# Patient Record
Sex: MALE | Race: WHITE
[De-identification: names, ages, dates, MRNs, and addresses within clinical notes are randomized per-mention and may not be internally consistent; named-entity substitution may affect disease eponyms.]

---

## 2017-12-20 ENCOUNTER — HOSPITAL ENCOUNTER (INPATIENT)
Dept: HOSPITAL 45 - C.2E | Age: 62
LOS: 6 days | Discharge: HOME | DRG: 683 | End: 2017-12-26
Attending: HOSPITALIST | Admitting: FAMILY MEDICINE
Payer: COMMERCIAL

## 2017-12-20 VITALS
BODY MASS INDEX: 21.49 KG/M2 | HEIGHT: 67 IN | BODY MASS INDEX: 21.49 KG/M2 | WEIGHT: 136.91 LBS | BODY MASS INDEX: 21.49 KG/M2 | HEIGHT: 67 IN | WEIGHT: 136.91 LBS

## 2017-12-20 VITALS
DIASTOLIC BLOOD PRESSURE: 72 MMHG | SYSTOLIC BLOOD PRESSURE: 108 MMHG | OXYGEN SATURATION: 97 % | HEART RATE: 72 BPM | TEMPERATURE: 98.6 F

## 2017-12-20 VITALS
OXYGEN SATURATION: 100 % | DIASTOLIC BLOOD PRESSURE: 82 MMHG | HEART RATE: 71 BPM | SYSTOLIC BLOOD PRESSURE: 142 MMHG | TEMPERATURE: 99.86 F

## 2017-12-20 VITALS — SYSTOLIC BLOOD PRESSURE: 135 MMHG | TEMPERATURE: 99.5 F | HEART RATE: 73 BPM | DIASTOLIC BLOOD PRESSURE: 85 MMHG

## 2017-12-20 DIAGNOSIS — N17.9: ICD-10-CM

## 2017-12-20 DIAGNOSIS — Z87.891: ICD-10-CM

## 2017-12-20 DIAGNOSIS — Z94.4: ICD-10-CM

## 2017-12-20 DIAGNOSIS — E11.9: ICD-10-CM

## 2017-12-20 DIAGNOSIS — N25.0: ICD-10-CM

## 2017-12-20 DIAGNOSIS — Z96.643: ICD-10-CM

## 2017-12-20 DIAGNOSIS — B19.10: ICD-10-CM

## 2017-12-20 DIAGNOSIS — I25.2: ICD-10-CM

## 2017-12-20 DIAGNOSIS — N18.6: Primary | ICD-10-CM

## 2017-12-20 DIAGNOSIS — E87.2: ICD-10-CM

## 2017-12-20 DIAGNOSIS — E83.51: ICD-10-CM

## 2017-12-20 DIAGNOSIS — G80.9: ICD-10-CM

## 2017-12-20 DIAGNOSIS — Z79.4: ICD-10-CM

## 2017-12-20 DIAGNOSIS — D63.8: ICD-10-CM

## 2017-12-20 LAB
ALBUMIN/GLOB SERPL: 0.8 {RATIO} (ref 0.9–2)
ALP SERPL-CCNC: 100 U/L (ref 45–117)
ALT SERPL-CCNC: 41 U/L (ref 12–78)
ANION GAP SERPL CALC-SCNC: 9 MMOL/L (ref 3–11)
AST SERPL-CCNC: 22 U/L (ref 15–37)
B-OH-BUTYR SERPL-SCNC: 1.69 MG/DL (ref 0.2–2.81)
BASOPHILS # BLD: 0.02 K/UL (ref 0–0.2)
BASOPHILS NFR BLD: 0.3 %
BUN SERPL-MCNC: 71 MG/DL (ref 7–18)
BUN/CREAT SERPL: 12.2 (ref 10–20)
CALCIUM SERPL-MCNC: 6.4 MG/DL (ref 8.5–10.1)
CHLORIDE SERPL-SCNC: 107 MMOL/L (ref 98–107)
CO2 SERPL-SCNC: 18 MMOL/L (ref 21–32)
COMPLETE: YES
CREAT CL PREDICTED SERPL C-G-VRATE: 11.7 ML/MIN
CREAT SERPL-MCNC: 5.77 MG/DL (ref 0.6–1.4)
EOSINOPHIL NFR BLD AUTO: 204 K/UL (ref 130–400)
GLOBULIN SER-MCNC: 3.7 GM/DL (ref 2.5–4)
GLUCOSE SERPL-MCNC: 496 MG/DL (ref 70–99)
HCT VFR BLD CALC: 27.2 % (ref 42–52)
IG%: 0.6 %
IMM GRANULOCYTES NFR BLD AUTO: 21.5 %
INR PPP: 1.1 (ref 0.9–1.1)
LYMPHOCYTES # BLD: 1.45 K/UL (ref 1.2–3.4)
MCH RBC QN AUTO: 33.2 PG (ref 25–34)
MCHC RBC AUTO-ENTMCNC: 34.6 G/DL (ref 32–36)
MCV RBC AUTO: 96.1 FL (ref 80–100)
MONOCYTES NFR BLD: 7.9 %
NEUTROPHILS # BLD AUTO: 1.6 %
NEUTROPHILS NFR BLD AUTO: 68.1 %
PARTIAL THROMBOPLASTIN RATIO: 1
PHOSPHATE SERPL-MCNC: 5.9 MG/DL (ref 2.5–4.9)
PMV BLD AUTO: 9.2 FL (ref 7.4–10.4)
POTASSIUM SERPL-SCNC: 4.7 MMOL/L (ref 3.5–5.1)
PROTHROMBIN TIME: 11.2 SECONDS (ref 9–12)
RBC # BLD AUTO: 2.83 M/UL (ref 4.7–6.1)
SODIUM SERPL-SCNC: 134 MMOL/L (ref 136–145)
WBC # BLD AUTO: 6.74 K/UL (ref 4.8–10.8)

## 2017-12-20 RX ADMIN — HUMAN INSULIN SCH MLS/HR: 100 INJECTION, SOLUTION SUBCUTANEOUS at 20:36

## 2017-12-20 RX ADMIN — INSULIN ASPART SCH UNITS: 100 INJECTION, SOLUTION INTRAVENOUS; SUBCUTANEOUS at 21:00

## 2017-12-20 RX ADMIN — HUMAN INSULIN SCH MLS/HR: 100 INJECTION, SOLUTION SUBCUTANEOUS at 22:14

## 2017-12-20 RX ADMIN — TACROLIMUS SCH MG: 1 CAPSULE ORAL at 21:12

## 2017-12-20 RX ADMIN — AMITRIPTYLINE HYDROCHLORIDE SCH MG: 50 TABLET, FILM COATED ORAL at 21:12

## 2017-12-20 RX ADMIN — CARVEDILOL SCH MG: 3.12 TABLET, FILM COATED ORAL at 21:12

## 2017-12-20 NOTE — PHARMACY PROGRESS NOTE
Glycemic Control Intl Consult


Date of Service


Dec 20, 2017.





Scope


Glycemic Pharmacist consulted by Dr Sexton on 12/20/17 for glycemic control 

and to write orders per [facility] inpatient glycemic control protocol





Objective


Weight (Kilograms):  63.100


Accuchecks BSG (last 24hrs):











Test


  12/20/17


18:08


 


Random Glucose


  496 mg/dl


(70-99)








Laboratory Data (last 24hrs)











Test


  12/20/17


18:08


 


Anion Gap 9.0 mmol/L 


 


BUN/Creatinine Ratio 12.2 


 


Blood Urea Nitrogen 71 mg/dl 


 


Creatinine 5.77 mg/dl 


 


Potassium Level 4.7 mmol/L 


 


Sodium Level 134 mmol/L 


 


White Blood Count 6.74 K/uL 


 


Red Blood Count 2.83 M/uL 


 


Hemoglobin 9.4 g/dL 


 


Hematocrit 27.2 % 


 


Mean Corpuscular Volume 96.1 fL 


 


Mean Corpuscular Hemoglobin 33.2 pg 


 


Mean Corpuscular Hemoglobin


Concent 34.6 g/dl 


 


 


Platelet Count 204 K/uL 


 


Mean Platelet Volume 9.2 fL 











Recent Pertinent Medications


Outpatient Anti-diabetic Regimen: 


* Lantus 30 units HS


* Novolog 8/10/12 units with meals 


* A1c = 9.6 %  08/2017 per MD





Assessment & Plan


ASSESSMENT:


* 63 yo M admitted with CKD 5 - per MD going to be new to HD starting tomorrow


* Insulin sensitivity likely changing with initiation of dialysis - would be 

more comfortable with insulin drip and BSG on admission ~500 mg/dL


* Spoke with MD, OK to start drip and hold IV fluids given renal disease


* Would be beneficial to speak with patient about outpatient glycemic control 

in the AM





PLAN FOR INPATIENT GLYCEMIC CONTROL:


* Start IV insulin infusion per moderate stress protocol


 * Goal Range 100 - 180 mg/dl


 * In the critical care setting, continuous IV insulin infusion has been shown 

to be the best method for achieving glycemic targets. 





* Hold off on Lantus has patient may be made NPO at midnight and receive HD for 

first time tomorrow





* Order updated A1c tomorrow








* Please note that the plan above was derived based on current level of insulin 

resistance and hospital stress. These recommendations are appropriate for 

inpatient admission only. Plan of care upon discharge will need to be 

reassessed to avoid potential outpatient hypo/hyperglycemia. 





Thank you.

## 2017-12-20 NOTE — HISTORY AND PHYSICAL
History & Physical


Date & Time of Service:


Dec 20, 2017 at 17:00


Chief Complaint:


Hypocalcemia, Urimea, Ckd Stage 5


Primary Care Physician:


Gonzalo Rothman D.O.


History of Present Illness


Source:  patient, hospital records


This is a 63yo M with a PMH of end stage renal disease, poorly controlled DM II

, cerebral palsy, CAD (s/p stents in 2011) and s/p liver transplant (1998) who 

was admitted directly from Dr. Arzola's nephrology clinic. Per patient, he has 

been in discussion with Dr. Arzola about the initiation of dialysis for awhile 

but has opted to continue monitoring labwork. Patient is a poor historian, but 

per chart review, he was admitted at [facility] in [facility] for 

uncontrolled diabetes from 12/6-12/12. During this time, his creatinine was >5. 

Followed up with  after hospitalization for repeat labwork. Based on 

abnormal labs, unintentional weight loss and decreased ability to make urine, 

the decision was made to initiate dialysis. Initially presented to [facility] yesterday but for unclear reasons, patient left. Was then directly 

admitted to [facility]. Has agreed to the elective tunnel catheter placement dialysis 

access by vascular surgery. 





Endorses decreased urinary output. Denies fever, chills, confusion, CP, SOB, 

abd pain, nausea, vomiting, constipation, fatigue. Has cerebral palsy and 

ambulates with a walker. Is more symptomatic on R side.





Past Medical/Surgical History


Medical Problems:


(1) Cerebral palsy


Status: Chronic  





(2) Diabetes mellitus type II, uncontrolled


Status: Chronic  





(3) End stage chronic kidney disease


Status: Chronic  





(4) Hepatitis B


Status: Chronic  





(5) Presence of stent in coronary artery in patient with coronary artery disease


Status: Chronic  





Surgical Problems:


(1) S/P liver transplant


Status: Chronic  








Family History


Patient is adopted and unaware of biological family history.





Social History


Smoking Status:  Former Smoker (Quit in 1990s)


Alcohol Use:  none


Marital Status:  single


Housing status:  lives alone





Allergies


Coded Allergies:  


     No Known Allergies (Unverified , 12/20/17)





Home Medications


Scheduled


Allopurinol (Allopurinol), 1 TAB PO DAILY


Amitriptyline Hcl (Amitriptyline Hcl), 1 TAB PO HS


Ascorbic Acid (Ascorbic Acid), 500 MG PO TIDM


Carvedilol (Carvedilol), 1 TAB PO BID


Cholecalciferol (D3 2000), 1 TAB PO DAILY


Clopidogrel (Plavix), 75 MG PO DAILY


Entecavir (Entecavir), 1 TAB PO DAILY


Ferrous Sulfate (Ferrous Sulfate), 1 TAB PO TIDM


Insulin Aspart (Novolog Flexpen), 10 UNITS PO with lunch


Insulin Aspart (Novolog), 12 PO with dinner


Insulin Aspart (Novolog Flexpen), 8 UNITS SUBD with breakfast


Insulin Glargine (Lantus), 30 SC QPM


Pantoprazole (Protonix), 40 MG PO DAILY


Pravastatin (Pravachol ), 20 MG PO DAILY


Sodium Bicarbonate (Antacid) (Sodium Bicarbonate), 1 TAB PO TID


Tacrolimus (Prograf), 4 CAP PO BID





Review of Systems


Ten systems reviewed and negative except as noted in the HPI.





Physical Exam


Vital Signs











  Date Time  Temp Pulse Resp B/P (MAP) Pulse Ox O2 Delivery O2 Flow Rate FiO2


 


12/20/17 20:00      Room Air  


 


12/20/17 19:30 37.7 71 20 142/82 (102) 100 Room Air  


 


12/20/17 16:30 37.5 73 22 135/85  Room Air  








General Appearance:  WD/WN, no apparent distress


Head:  normocephalic, atraumatic


Eyes:  normal inspection, PERRL, sclerae normal


ENT:  normal ENT inspection, hearing grossly normal, pharynx normal (moist 

mucous membranes)


Neck:  supple, no JVD, trachea midline


Respiratory/Chest:  chest non-tender, lungs clear, normal breath sounds, no 

respiratory distress, no accessory muscle use


Cardiovascular:  regular rate, rhythm, no murmur, normal peripheral pulses


Abdomen/GI:  non tender, soft, no organomegaly


Extremities/Musculoskelatal:  no calf tenderness, no pedal edema, non-tender


Neurologic/Psych:  alert, normal mood/affect, oriented x 3, + pertinent finding 

(Chronic gait abnormality 2/2 cerebral palsy )


Skin:  normal color, warm/dry, no rash





Diagnostics


Laboratory Results





Results Past 24 Hours








Test


  12/20/17


18:08 12/20/17


19:31 12/20/17


21:30 Range/Units


 


 


White Blood Count 6.74   4.8-10.8  K/uL


 


Red Blood Count 2.83   4.7-6.1  M/uL


 


Hemoglobin 9.4   14.0-18.0  g/dL


 


Hematocrit 27.2   42-52  %


 


Mean Corpuscular Volume 96.1     fL


 


Mean Corpuscular Hemoglobin 33.2   25-34  pg


 


Mean Corpuscular Hemoglobin


Concent 34.6


  


  


  32-36  g/dl


 


 


Platelet Count 204   130-400  K/uL


 


Mean Platelet Volume 9.2   7.4-10.4  fL


 


Neutrophils (%) (Auto) 68.1    %


 


Lymphocytes (%) (Auto) 21.5    %


 


Monocytes (%) (Auto) 7.9    %


 


Eosinophils (%) (Auto) 1.6    %


 


Basophils (%) (Auto) 0.3    %


 


Neutrophils # (Auto) 4.59   1.4-6.5  K/uL


 


Lymphocytes # (Auto) 1.45   1.2-3.4  K/uL


 


Monocytes # (Auto) 0.53   0.11-0.59  K/uL


 


Eosinophils # (Auto) 0.11   0-0.5  K/uL


 


Basophils # (Auto) 0.02   0-0.2  K/uL


 


RDW Standard Deviation 46.3   36.4-46.3  fL


 


RDW Coefficient of Variation 13.3   11.5-14.5  %


 


Immature Granulocyte % (Auto) 0.6    %


 


Immature Granulocyte # (Auto) 0.04   0.00-0.02  K/uL


 


Red Blood Cell Morphology Unremarkable    


 


Prothrombin Time


  11.2


  


  


  9.0-12.0


SECONDS


 


Prothromb Time International


Ratio 1.1


  


  


  0.9-1.1  


 


 


Activated Partial


Thromboplast Time 26.1


  


  


  21.0-31.0


SECONDS


 


Partial Thromboplastin Ratio 1.0    


 


Sodium Level 134   136-145  mmol/L


 


Potassium Level 4.7   3.5-5.1  mmol/L


 


Chloride Level 107     mmol/L


 


Carbon Dioxide Level 18   21-32  mmol/L


 


Anion Gap 9.0   3-11  mmol/L


 


Blood Urea Nitrogen 71   7-18  mg/dl


 


Creatinine


  5.77


  


  


  0.60-1.40


mg/dl


 


Est Creatinine Clear Calc


Drug Dose 11.7


  


  


   ml/min


 


 


Estimated GFR (


American) 11.1


  


  


   


 


 


Estimated GFR (Non-


American 9.6


  


  


   


 


 


BUN/Creatinine Ratio 12.2   10-20  


 


Random Glucose 496   70-99  mg/dl


 


Calcium Level 6.4   8.5-10.1  mg/dl


 


Phosphorus Level 5.9   2.5-4.9  mg/dl


 


Total Bilirubin 0.3   0.2-1  mg/dl


 


Aspartate Amino Transf


(AST/SGOT) 22


  


  


  15-37  U/L


 


 


Alanine Aminotransferase


(ALT/SGPT) 41


  


  


  12-78  U/L


 


 


Alkaline Phosphatase 100     U/L


 


Total Protein 6.6   6.4-8.2  gm/dl


 


Albumin 2.9   3.4-5.0  gm/dl


 


Globulin 3.7   2.5-4.0  gm/dl


 


Albumin/Globulin Ratio 0.8   0.9-2  


 


Beta-Hydroxybutyric Acid 1.69   0.2-2.81  mg/dL


 


Bedside Glucose  478 382 70-99  mg/dl











EKG


Sinus rhythm with occasional Premature ventricular complexes





Impression


Assessment and Plan


This is a 63yo M with a PMH of end stage renal disease, poorly controlled DM II

, cerebral palsy, CAD (s/p stents in 2011) and s/p liver transplant (1998) who 

was admitted directly from 's nephrology clinic for initiation of 

dialysis. 





End stage renal disease: 


-Cr elevated to 5.7 since 12/6 hospital admission


-Cr 2.7 previously 


-Nephrology consulted 


-Vascular consulted 


-Tunnel catheter placement scheduled for tomorrow 


-NPO afrer midnight 


-Monitor CMP 





DM II: uncontrolled


-Last hgb a1c of 9.6 in August 2017 


-Recheck hgb a1c


-BG in 400s when admitted 


-Glycemic consult placed


-Insulin drip initiated   





CAD (s/p stents): 


-H/o MIs in 2009 and 2011


-No CP, EKG without ischemia 


-Cont home dose coreg, statin


-Plavix held until after procedure 





S/p liver transplant: 


-H/o Hepatitic B 


-Continue Prograf, Entecavir 





Anemia: 


-2/2 chronic disease 


-No acute bleeding


-Monitor CBC 





DVT Ppx: SCDs 


Code status: FULL 


PCP: Stephan 


Dispo: Admitted to telemetry. Discharge planning ordered. 





Patient seen in collaboration with Dr. Sexton. Please see addendum.





Level of Care


Telemetry





Advanced Directives


Existing Living Will:  No


Existing Power of :  No





Resuscitation Status


FULL RESUSCITATION





VTE Prophylaxis


VTE Risk Assessment Done? Y/N:  Yes


Risk Level:  Moderate


Given or contraindicated:  SCD's





Social Service Consult


Receiving Home Health

## 2017-12-20 NOTE — PROGRESS NOTE
Internal Med Progress Note


Date of Service:


Dec 20, 2017.


Provider Documentation:





SUBJECTIVE:





Patient is a 62 yr male with PMH of DM II, End Stage Renal Disease, Cerebral 

Palsy, S/P Liver transplant and other problems was a direct admit on referral 

from his Nephrologist  for an elective Tunneled catheter placement and 

initiation of dialysis. Patient is a poor historian. Denies any history of 

chest pain, SOB, dizziness, cough, fever, chills, nausea, vomiting, abdominal 

pain, diarrhea, dysuria. Please refer to H&P for complete details.   








OBJECTIVE:





Vital Signs-as noted below





Physical Exam:


General Appearance:Moderately built and nourished, no apparent distress


Head:  normocephalic, Atraumatic 


Eyes:  normal inspection, EOMI, PERRL


Neck:  supple, Trachea midline


Respiratory/Chest: Normal breath sounds, CTA


Cardiovascular: S1, S2, No murmur


Abdomen/GI:Soft, Non tender, Bowel sounds present


Extremities/Musculoskelatal:normal inspection, no edema 


Neurologic/Psych:AAOX3, grossly no focal neurological deficits 


Skin:  normal color, warm





Lab data as noted below.


ASSESSMENT & PLAN:





End stage Renal Disease:


Cr:5.7


Vascular surgery consulted for Tunneled Catheter placement


Nephrology consulted for management of Hemodialysis


Monitor renal function








Uncontrolled DM II:


Last A1C:9.6 in Dec, 2017


Blood glucose levels in 400s


Will start on Insulin drip


Pharmacy Glycemic control consulted


Pharmacy to manage Insulin








Anemia of Chronic Disease:


No acute bleeding issues


Monitor Hb








Please review H&P for further details and management


Vital Signs:











  Date Time  Temp Pulse Resp B/P (MAP) Pulse Ox O2 Delivery O2 Flow Rate FiO2


 


12/20/17 19:30 37.7 71 20 142/82 (102) 100 Room Air  


 


12/20/17 16:30 37.5 73 22 135/85  Room Air  








Lab Results:





Results Past 24 Hours








Test


  12/20/17


18:08 12/20/17


19:31 Range/Units


 


 


White Blood Count 6.74  4.8-10.8  K/uL


 


Red Blood Count 2.83  4.7-6.1  M/uL


 


Hemoglobin 9.4  14.0-18.0  g/dL


 


Hematocrit 27.2  42-52  %


 


Mean Corpuscular Volume 96.1    fL


 


Mean Corpuscular Hemoglobin 33.2  25-34  pg


 


Mean Corpuscular Hemoglobin


Concent 34.6


  


  32-36  g/dl


 


 


Platelet Count 204  130-400  K/uL


 


Mean Platelet Volume 9.2  7.4-10.4  fL


 


Neutrophils (%) (Auto) 68.1   %


 


Lymphocytes (%) (Auto) 21.5   %


 


Monocytes (%) (Auto) 7.9   %


 


Eosinophils (%) (Auto) 1.6   %


 


Basophils (%) (Auto) 0.3   %


 


Neutrophils # (Auto) 4.59  1.4-6.5  K/uL


 


Lymphocytes # (Auto) 1.45  1.2-3.4  K/uL


 


Monocytes # (Auto) 0.53  0.11-0.59  K/uL


 


Eosinophils # (Auto) 0.11  0-0.5  K/uL


 


Basophils # (Auto) 0.02  0-0.2  K/uL


 


RDW Standard Deviation 46.3  36.4-46.3  fL


 


RDW Coefficient of Variation 13.3  11.5-14.5  %


 


Immature Granulocyte % (Auto) 0.6   %


 


Immature Granulocyte # (Auto) 0.04  0.00-0.02  K/uL


 


Red Blood Cell Morphology Unremarkable   


 


Prothrombin Time


  11.2


  


  9.0-12.0


SECONDS


 


Prothromb Time International


Ratio 1.1


  


  0.9-1.1  


 


 


Activated Partial


Thromboplast Time 26.1


  


  21.0-31.0


SECONDS


 


Partial Thromboplastin Ratio 1.0   


 


Sodium Level 134  136-145  mmol/L


 


Potassium Level 4.7  3.5-5.1  mmol/L


 


Chloride Level 107    mmol/L


 


Carbon Dioxide Level 18  21-32  mmol/L


 


Anion Gap 9.0  3-11  mmol/L


 


Blood Urea Nitrogen 71  7-18  mg/dl


 


Creatinine


  5.77


  


  0.60-1.40


mg/dl


 


Est Creatinine Clear Calc


Drug Dose 11.7


  


   ml/min


 


 


Estimated GFR (


American) 11.1


  


   


 


 


Estimated GFR (Non-


American 9.6


  


   


 


 


BUN/Creatinine Ratio 12.2  10-20  


 


Random Glucose 496  70-99  mg/dl


 


Calcium Level 6.4  8.5-10.1  mg/dl


 


Phosphorus Level 5.9  2.5-4.9  mg/dl


 


Total Bilirubin 0.3  0.2-1  mg/dl


 


Aspartate Amino Transf


(AST/SGOT) 22


  


  15-37  U/L


 


 


Alanine Aminotransferase


(ALT/SGPT) 41


  


  12-78  U/L


 


 


Alkaline Phosphatase 100    U/L


 


Total Protein 6.6  6.4-8.2  gm/dl


 


Albumin 2.9  3.4-5.0  gm/dl


 


Globulin 3.7  2.5-4.0  gm/dl


 


Albumin/Globulin Ratio 0.8  0.9-2  


 


Beta-Hydroxybutyric Acid 1.69  0.2-2.81  mg/dL


 


Bedside Glucose  478 70-99  mg/dl

## 2017-12-21 VITALS — HEART RATE: 83 BPM | DIASTOLIC BLOOD PRESSURE: 90 MMHG | SYSTOLIC BLOOD PRESSURE: 158 MMHG | OXYGEN SATURATION: 100 %

## 2017-12-21 VITALS
TEMPERATURE: 98.96 F | HEART RATE: 72 BPM | DIASTOLIC BLOOD PRESSURE: 77 MMHG | OXYGEN SATURATION: 97 % | SYSTOLIC BLOOD PRESSURE: 129 MMHG

## 2017-12-21 VITALS
DIASTOLIC BLOOD PRESSURE: 91 MMHG | OXYGEN SATURATION: 100 % | TEMPERATURE: 96.98 F | HEART RATE: 67 BPM | SYSTOLIC BLOOD PRESSURE: 146 MMHG

## 2017-12-21 VITALS — DIASTOLIC BLOOD PRESSURE: 94 MMHG | SYSTOLIC BLOOD PRESSURE: 162 MMHG | HEART RATE: 65 BPM

## 2017-12-21 VITALS
TEMPERATURE: 97.7 F | HEART RATE: 68 BPM | SYSTOLIC BLOOD PRESSURE: 127 MMHG | DIASTOLIC BLOOD PRESSURE: 81 MMHG | OXYGEN SATURATION: 100 %

## 2017-12-21 VITALS
OXYGEN SATURATION: 100 % | DIASTOLIC BLOOD PRESSURE: 90 MMHG | TEMPERATURE: 98.6 F | HEART RATE: 69 BPM | SYSTOLIC BLOOD PRESSURE: 140 MMHG

## 2017-12-21 VITALS — TEMPERATURE: 96.98 F | HEART RATE: 62 BPM | SYSTOLIC BLOOD PRESSURE: 168 MMHG | DIASTOLIC BLOOD PRESSURE: 92 MMHG

## 2017-12-21 VITALS — DIASTOLIC BLOOD PRESSURE: 84 MMHG | HEART RATE: 64 BPM | TEMPERATURE: 97.16 F | SYSTOLIC BLOOD PRESSURE: 152 MMHG

## 2017-12-21 VITALS — SYSTOLIC BLOOD PRESSURE: 140 MMHG | HEART RATE: 67 BPM | DIASTOLIC BLOOD PRESSURE: 94 MMHG

## 2017-12-21 VITALS — SYSTOLIC BLOOD PRESSURE: 157 MMHG | DIASTOLIC BLOOD PRESSURE: 91 MMHG | HEART RATE: 64 BPM

## 2017-12-21 VITALS — DIASTOLIC BLOOD PRESSURE: 84 MMHG | SYSTOLIC BLOOD PRESSURE: 132 MMHG | HEART RATE: 60 BPM

## 2017-12-21 VITALS
HEART RATE: 66 BPM | DIASTOLIC BLOOD PRESSURE: 68 MMHG | TEMPERATURE: 97.7 F | OXYGEN SATURATION: 99 % | SYSTOLIC BLOOD PRESSURE: 109 MMHG

## 2017-12-21 VITALS
OXYGEN SATURATION: 99 % | DIASTOLIC BLOOD PRESSURE: 65 MMHG | HEART RATE: 65 BPM | TEMPERATURE: 98.06 F | SYSTOLIC BLOOD PRESSURE: 107 MMHG

## 2017-12-21 VITALS — OXYGEN SATURATION: 100 % | DIASTOLIC BLOOD PRESSURE: 81 MMHG | HEART RATE: 73 BPM | SYSTOLIC BLOOD PRESSURE: 153 MMHG

## 2017-12-21 VITALS — SYSTOLIC BLOOD PRESSURE: 151 MMHG | DIASTOLIC BLOOD PRESSURE: 82 MMHG | HEART RATE: 64 BPM

## 2017-12-21 VITALS — SYSTOLIC BLOOD PRESSURE: 141 MMHG | HEART RATE: 67 BPM | DIASTOLIC BLOOD PRESSURE: 91 MMHG

## 2017-12-21 VITALS — SYSTOLIC BLOOD PRESSURE: 148 MMHG | DIASTOLIC BLOOD PRESSURE: 78 MMHG | HEART RATE: 60 BPM

## 2017-12-21 VITALS — DIASTOLIC BLOOD PRESSURE: 89 MMHG | SYSTOLIC BLOOD PRESSURE: 140 MMHG | HEART RATE: 65 BPM

## 2017-12-21 VITALS — OXYGEN SATURATION: 100 % | SYSTOLIC BLOOD PRESSURE: 126 MMHG | DIASTOLIC BLOOD PRESSURE: 83 MMHG | HEART RATE: 78 BPM

## 2017-12-21 LAB
ANION GAP SERPL CALC-SCNC: 9 MMOL/L (ref 3–11)
BUN SERPL-MCNC: 69 MG/DL (ref 7–18)
BUN/CREAT SERPL: 12.4 (ref 10–20)
CALCIUM SERPL-MCNC: 6.3 MG/DL (ref 8.5–10.1)
CHLORIDE SERPL-SCNC: 115 MMOL/L (ref 98–107)
CO2 SERPL-SCNC: 17 MMOL/L (ref 21–32)
CREAT CL PREDICTED SERPL C-G-VRATE: 11.9 ML/MIN
CREAT SERPL-MCNC: 5.62 MG/DL (ref 0.6–1.4)
EOSINOPHIL NFR BLD AUTO: 181 K/UL (ref 130–400)
EST. AVERAGE GLUCOSE BLD GHB EST-MCNC: 209 MG/DL
GLUCOSE SERPL-MCNC: 85 MG/DL (ref 70–99)
HCT VFR BLD CALC: 25.2 % (ref 42–52)
MAGNESIUM SERPL-MCNC: 1.7 MG/DL (ref 1.8–2.4)
MCH RBC QN AUTO: 33.2 PG (ref 25–34)
MCHC RBC AUTO-ENTMCNC: 34.5 G/DL (ref 32–36)
MCV RBC AUTO: 96.2 FL (ref 80–100)
PHOSPHATE SERPL-MCNC: 6.6 MG/DL (ref 2.5–4.9)
PMV BLD AUTO: 9.3 FL (ref 7.4–10.4)
POTASSIUM SERPL-SCNC: 4 MMOL/L (ref 3.5–5.1)
RBC # BLD AUTO: 2.62 M/UL (ref 4.7–6.1)
SODIUM SERPL-SCNC: 141 MMOL/L (ref 136–145)
WBC # BLD AUTO: 7.11 K/UL (ref 4.8–10.8)

## 2017-12-21 PROCEDURE — 05HM33Z INSERTION OF INFUSION DEVICE INTO RIGHT INTERNAL JUGULAR VEIN, PERCUTANEOUS APPROACH: ICD-10-PCS | Performed by: SURGERY

## 2017-12-21 RX ADMIN — INSULIN ASPART SCH UNITS: 100 INJECTION, SOLUTION INTRAVENOUS; SUBCUTANEOUS at 18:00

## 2017-12-21 RX ADMIN — CALCIUM ACETATE SCH MG: 667 CAPSULE ORAL at 11:30

## 2017-12-21 RX ADMIN — ALLOPURINOL SCH MG: 100 TABLET ORAL at 09:00

## 2017-12-21 RX ADMIN — CALCIUM ACETATE SCH MG: 667 CAPSULE ORAL at 17:11

## 2017-12-21 RX ADMIN — OXYCODONE HYDROCHLORIDE AND ACETAMINOPHEN SCH MG: 500 TABLET ORAL at 17:11

## 2017-12-21 RX ADMIN — INSULIN ASPART SCH UNITS: 100 INJECTION, SOLUTION INTRAVENOUS; SUBCUTANEOUS at 08:00

## 2017-12-21 RX ADMIN — PANTOPRAZOLE SCH MG: 40 TABLET, DELAYED RELEASE ORAL at 12:04

## 2017-12-21 RX ADMIN — TACROLIMUS SCH MG: 1 CAPSULE ORAL at 21:15

## 2017-12-21 RX ADMIN — OXYCODONE HYDROCHLORIDE AND ACETAMINOPHEN SCH MG: 500 TABLET ORAL at 07:30

## 2017-12-21 RX ADMIN — Medication SCH INTER.UNIT: at 09:00

## 2017-12-21 RX ADMIN — CARVEDILOL SCH MG: 3.12 TABLET, FILM COATED ORAL at 21:15

## 2017-12-21 RX ADMIN — CALCIUM ACETATE SCH MG: 667 CAPSULE ORAL at 07:30

## 2017-12-21 RX ADMIN — CARVEDILOL SCH MG: 3.12 TABLET, FILM COATED ORAL at 09:00

## 2017-12-21 RX ADMIN — OXYCODONE HYDROCHLORIDE AND ACETAMINOPHEN SCH MG: 500 TABLET ORAL at 11:30

## 2017-12-21 RX ADMIN — PRAVASTATIN SODIUM SCH MG: 20 TABLET ORAL at 09:00

## 2017-12-21 RX ADMIN — AMITRIPTYLINE HYDROCHLORIDE SCH MG: 50 TABLET, FILM COATED ORAL at 21:15

## 2017-12-21 RX ADMIN — INSULIN ASPART SCH UNITS: 100 INJECTION, SOLUTION INTRAVENOUS; SUBCUTANEOUS at 12:00

## 2017-12-21 RX ADMIN — TACROLIMUS SCH MG: 1 CAPSULE ORAL at 09:00

## 2017-12-21 NOTE — [facility] OPERATIVE REPORT
Operative Report


Operative Date


Dec 21, 2017.





Pre-Operative Diagnosis





Acute Renal Failure





Post-Operative Diagnosis





None





Procedure(s) Performed





Insertion of Perm Catheter, Right Internal Jugular Approach, Ultrasound 


Localization of Right Internal Jugular Vein, Fluoroscopy for positioning.





Surgeon


Dr. Waite





Assistant Surgeon(s)


None





Estimated Blood Loss


5





Findings


Tip in distal SVC





Specimens





None





Anesthesia


Local with sedation





Complication(s)


None





Disposition


Recovery Room / PACU





Indications


Patient is a 63 yo white male with acute renal failure in need of dialysis.  

Permcath insertion was recommended.  I have discussed the risks options and 

benefits of the procedure with the patient.  The patient understands the risks 

options and benefits and agrees to the procedure.





Description of Procedure


Patient was takent to the angio suite and placed in the supine position.  The 

right side of the neck and chest wall were prepped and draped in a sterile 

manner.  Local anesthesia was then administered to the appropriate areas of the 

neck and chest wall.  Ultrasound was then used to locate the right internal 

jugular vein.  The vein compressed easily, had no filing defects, and was 

patent.  The vein was then punctured under direct ultrasound imaging.  A 

guidewire was then passed centrally under fluoroscopic imaging.  A stab wound 

was then made in the anterior chest wall and a 19 cm permcath was passed from 

the stab wound on the chest wall to the puncture site on the neck.  The 

puncture site was then dilated till the 14Fr peel away sheath was inserted.  

The permcath was then inserted through the sheath to a central position in the 

distal superior vena cava.  The peel away sheath was then removed.  The 

catheter was then sutured in place using nylon sutures.  The puncture was then 

closed using a 4-0 Vicryl subcuticular suture.  Dermabond was used for a 

dressing on the puncture site.  Both ports aspirated and flushed easily and 

were then packed with heparin.  A sterile dressing was applied to the catheter.

  The patient left the angio suite in good condition and tolerated the 

procedure well.


I attest to the content of the Intraoperative Record and any orders documented 

therein.  Any exceptions are noted below.

## 2017-12-21 NOTE — PROGRESS NOTE
Internal Med Progress Note


Date of Service:


Dec 21, 2017.


Provider Documentation:





SUBJECTIVE:


Seen and examined at bedside


Feels well


No complaints


Planned for Tunneled Catheter placement and  dialysis today  








OBJECTIVE:





Vital Signs-as noted below





Physical Exam:





General Appearance:Moderately built and nourished, no apparent distress


Head:  normocephalic, Atraumatic 


Eyes:  normal inspection, EOMI, PERRL


Neck:  supple, Trachea midline


Respiratory/Chest: Normal breath sounds, CTA


Cardiovascular: S1, S2, No murmur


Abdomen/GI:Soft, Non tender, Bowel sounds present


Extremities/Musculoskelatal:normal inspection, no edema 


Neurologic/Psych:AAOX3, grossly no focal neurological deficits 


Skin:  normal color, warm








Lab data as noted below.


ASSESSMENT & PLAN:





Patient is a 62yr male with PMH of ESRD, poorly controlled DM II, cerebral palsy

, CAD (s/p stents in 2011) and s/p liver transplant (1998) who was admitted 

directly from 's nephrology clinic for initiation of dialysis. 








End stage Renal Disease:


Cr:5.62


Vascular surgery consulted for Tunneled Catheter placement today


Nephrology consulted for management of Hemodialysis


Monitor renal function








Uncontrolled DM II:


Last A1C:9.6 in Dec, 2017


A1C:8.9


Blood glucose levels in 400s on presentation


Blood sugar levels improved


DC Insulin drip


Start ISS


Pharmacy Glycemic control consulted


Pharmacy to manage Insulin








Anemia of Chronic Disease:


No acute bleeding issues


Monitor Hb


Hb:8.7 today











CAD S/P stents  


H/o MIs in 2009 and 2011


No acute issues  


Continue coreg, statin


Plavix held for procedure 








S/p liver transplant: 


H/o Hepatitic B 


Continue Prograf, Entecavir 








DVT Px:


SCDs 





Code status:


 FULL 





PCP: Stephan 





Disposition:


Plan to discharge home when stable


Vital Signs:











  Date Time  Temp Pulse Resp B/P (MAP) Pulse Ox O2 Delivery O2 Flow Rate FiO2


 


12/21/17 08:22 36.7 65 16 107/65 (79) 99 Room Air  


 


12/21/17 08:00      Room Air  


 


12/21/17 04:00      Room Air  


 


12/21/17 03:49 36.5 66 16 109/68 (82) 99 Room Air  


 


12/20/17 23:59      Room Air  


 


12/20/17 23:29 37.0 72 20 108/72 (84) 97 Room Air  


 


12/20/17 20:00      Room Air  


 


12/20/17 19:30 37.7 71 20 142/82 (102) 100 Room Air  


 


12/20/17 16:30 37.5 73 22 135/85  Room Air  








Lab Results:





Results Past 24 Hours








Test


  12/20/17


18:08 12/20/17


19:31 12/20/17


21:30 12/20/17


23:28 Range/Units


 


 


White Blood Count 6.74    4.8-10.8  K/uL


 


Red Blood Count 2.83    4.7-6.1  M/uL


 


Hemoglobin 9.4    14.0-18.0  g/dL


 


Hematocrit 27.2    42-52  %


 


Mean Corpuscular Volume 96.1      fL


 


Mean Corpuscular Hemoglobin 33.2    25-34  pg


 


Mean Corpuscular Hemoglobin


Concent 34.6


  


  


  


  32-36  g/dl


 


 


Platelet Count 204    130-400  K/uL


 


Mean Platelet Volume 9.2    7.4-10.4  fL


 


Neutrophils (%) (Auto) 68.1     %


 


Lymphocytes (%) (Auto) 21.5     %


 


Monocytes (%) (Auto) 7.9     %


 


Eosinophils (%) (Auto) 1.6     %


 


Basophils (%) (Auto) 0.3     %


 


Neutrophils # (Auto) 4.59    1.4-6.5  K/uL


 


Lymphocytes # (Auto) 1.45    1.2-3.4  K/uL


 


Monocytes # (Auto) 0.53    0.11-0.59  K/uL


 


Eosinophils # (Auto) 0.11    0-0.5  K/uL


 


Basophils # (Auto) 0.02    0-0.2  K/uL


 


RDW Standard Deviation 46.3    36.4-46.3  fL


 


RDW Coefficient of Variation 13.3    11.5-14.5  %


 


Immature Granulocyte % (Auto) 0.6     %


 


Immature Granulocyte # (Auto) 0.04    0.00-0.02  K/uL


 


Red Blood Cell Morphology Unremarkable     


 


Prothrombin Time


  11.2


  


  


  


  9.0-12.0


SECONDS


 


Prothromb Time International


Ratio 1.1


  


  


  


  0.9-1.1  


 


 


Activated Partial


Thromboplast Time 26.1


  


  


  


  21.0-31.0


SECONDS


 


Partial Thromboplastin Ratio 1.0     


 


Sodium Level 134    136-145  mmol/L


 


Potassium Level 4.7    3.5-5.1  mmol/L


 


Chloride Level 107      mmol/L


 


Carbon Dioxide Level 18    21-32  mmol/L


 


Anion Gap 9.0    3-11  mmol/L


 


Blood Urea Nitrogen 71    7-18  mg/dl


 


Creatinine


  5.77


  


  


  


  0.60-1.40


mg/dl


 


Est Creatinine Clear Calc


Drug Dose 11.7


  


  


  


   ml/min


 


 


Estimated GFR (


American) 11.1


  


  


  


   


 


 


Estimated GFR (Non-


American 9.6


  


  


  


   


 


 


BUN/Creatinine Ratio 12.2    10-20  


 


Random Glucose 496    70-99  mg/dl


 


Calcium Level 6.4    8.5-10.1  mg/dl


 


Phosphorus Level 5.9    2.5-4.9  mg/dl


 


Total Bilirubin 0.3    0.2-1  mg/dl


 


Aspartate Amino Transf


(AST/SGOT) 22


  


  


  


  15-37  U/L


 


 


Alanine Aminotransferase


(ALT/SGPT) 41


  


  


  


  12-78  U/L


 


 


Alkaline Phosphatase 100      U/L


 


Total Protein 6.6    6.4-8.2  gm/dl


 


Albumin 2.9    3.4-5.0  gm/dl


 


Globulin 3.7    2.5-4.0  gm/dl


 


Albumin/Globulin Ratio 0.8    0.9-2  


 


Beta-Hydroxybutyric Acid 1.69    0.2-2.81  mg/dL


 


Bedside Glucose  478 382 352 70-99  mg/dl


 


Test


  12/21/17


00:31 12/21/17


01:31 12/21/17


02:30 12/21/17


04:34 Range/Units


 


 


Bedside Glucose 230 181 164 101 70-99  mg/dl


 


Test


  12/21/17


05:36 12/21/17


06:31 12/21/17


06:57 12/21/17


08:12 Range/Units


 


 


White Blood Count 7.11    4.8-10.8  K/uL


 


Red Blood Count 2.62    4.7-6.1  M/uL


 


Hemoglobin 8.7    14.0-18.0  g/dL


 


Hematocrit 25.2    42-52  %


 


Mean Corpuscular Volume 96.2      fL


 


Mean Corpuscular Hemoglobin 33.2    25-34  pg


 


Mean Corpuscular Hemoglobin


Concent 34.5


  


  


  


  32-36  g/dl


 


 


RDW Standard Deviation 45.9    36.4-46.3  fL


 


RDW Coefficient of Variation 13.3    11.5-14.5  %


 


Platelet Count 181    130-400  K/uL


 


Mean Platelet Volume 9.3    7.4-10.4  fL


 


Sodium Level 141    136-145  mmol/L


 


Potassium Level 4.0    3.5-5.1  mmol/L


 


Chloride Level 115      mmol/L


 


Carbon Dioxide Level 17    21-32  mmol/L


 


Anion Gap 9.0    3-11  mmol/L


 


Blood Urea Nitrogen 69    7-18  mg/dl


 


Creatinine


  5.62


  


  


  


  0.60-1.40


mg/dl


 


Est Creatinine Clear Calc


Drug Dose 11.9


  


  


  


   ml/min


 


 


Estimated GFR (


American) 11.5


  


  


  


   


 


 


Estimated GFR (Non-


American 9.9


  


  


  


   


 


 


BUN/Creatinine Ratio 12.4    10-20  


 


Random Glucose 85    70-99  mg/dl


 


Estimated Average Glucose 209     mg/dl


 


Hemoglobin A1c 8.9    4.5-5.6  %


 


Calcium Level 6.3    8.5-10.1  mg/dl


 


Phosphorus Level 6.6    2.5-4.9  mg/dl


 


Magnesium Level 1.7    1.8-2.4  mg/dl


 


Bedside Glucose  74 133 86 70-99  mg/dl

## 2017-12-21 NOTE — CLINICAL DOCUMENTATION QUERY
Dr. SKAGGS, [facility] :



**** CLINICAL DOCUMENTATION QUERY****



As able, please specify the type of CP in your patient as this affects severity of illness 
and risk of mortality.  Thank you. 



In your clinical opinion is this patient being managed for:

    

                        ( x ) other cerebral palsy

                        (  ) Not Agree



                        (  ) Other explanation of clinical findings (Please Explain)

                        (  ) Unable to determine (Please Define)

                        (  ) Need to Discuss

                        



The medical record reflects the following clinical findings, treatment, and risk factors.  
  



Clinical Indicators: Ambulates with walker, more symptomatic on right side

Treatment: Walker

Risk Factors: Idiopathic



Please clarify and document your clinical opinion in the progress notes and discharge 
summary. Terms such as "probable", "suspected", "likely", "questionable", "possible", or 
"still to be ruled out" are acceptable. 



*****IF IN AGREEMENT, YOU MUST DOCUMENT ABOVE DIAGNOSTIC STATEMENT IN DAILY PROGRESS NOTES 
AND DISCHARGE SUMMARY. This document is not part of the patient's record.*****

Thank You, Melo Meraz, [phone]

## 2017-12-21 NOTE — ANESTHESIOLOGY PROGRESS NOTE
Anesthesia Post Op Note


Date & Time


Dec 21, 2017 at 15:42





Vital Signs


Pain Intensity:  0





Vital Signs Past 12 Hours








  Date Time  Temp Pulse Resp B/P (MAP) Pulse Ox O2 Delivery O2 Flow Rate FiO2


 


12/21/17 15:28 36.8 70 18 118/78 100 Room Air  


 


12/21/17 12:08      Room Air  


 


12/21/17 12:01 36.5 68 16 127/81 (96) 100   


 


12/21/17 08:22 36.7 65 16 107/65 (79) 99 Room Air  


 


12/21/17 08:00      Room Air  


 


12/21/17 04:00      Room Air  


 


12/21/17 03:49 36.5 66 16 109/68 (82) 99 Room Air  











Notes


Mental Status:  alert / awake / arousable, participated in evaluation


Pt Amnestic to Procedure:  Yes


Nausea / Vomiting:  adequately controlled


Pain:  adequately controlled


Airway Patency, RR, SpO2:  stable & adequate


BP & HR:  stable & adequate


Hydration State:  stable & adequate


Anesthetic Complications:  no major complications apparent

## 2017-12-21 NOTE — NEPHROLOGY CONSULTATION
DATE OF CONSULTATION:  12/21/2017

 

ATTENDING OF RECORD:  Steven Sexton MD

 

REASON FOR CONSULTATION:  End-stage renal disease.

 

HISTORY OF PRESENT ILLNESS:  This is a 62-year-old male who follows with me

in the outpatient clinic, who at a previous visit over this summer had CKD

stage IV with no proteinuria with creatinine of about 3 with the right kidney of

10.5 cm and the left kidney of 10.6 cm with echogenicity consistent with

medical renal disease from underlying diabetes and chronic allograft

nephropathy.  The patient has been a diabetic since 1998 after liver

transplant.  Hemoglobin A1c is consistently in the 9-10 range and he has

difficult time in controlling his blood sugars.  He had a liver transplant

secondary to hep B cirrhosis in 1998 and follows with liver transplant team in

[place] on Prograf 4 mg p.o. b.i.d.  The patient also has underlying

heart disease with MI in 2010 and 2011 with 2 stents, has underlying sleep

apnea as well as cerebral palsy, which affects his right side.  The patient

was doing well and then since Thanksgiving started to lose weight, had

decreased appetite and went in to [facility] from December 6th to

December 12th with hyperglycemia and creatinine was up to 5.15.  I saw him in

followup and creatinine was not improving and was having poor urinary flow

with just small dribbles out and sent to [facility] who decided labs

were stable and did not warrant admission.  So, I decided to

subsequently directly admit the patient to [facility] for a

tunneled dialysis catheter placement, insulin drip to help control blood

sugars and IV calcium to help raise the calcium levels, which were dropping

in the setting of elevated phosphorus levels.  The patient has noticed more

itching of his skin, likely a consequence of the hyperphosphatemia.

 

PAST MEDICAL HISTORY:  As above with diabetes, liver transplant, hep B

cirrhosis, heart disease with MI x2 with 2 stents, sleep apnea, cerebral

palsy, and hypertension.

 

PAST SURGICAL HISTORY:  Two stents, liver transplant, and total hip

replacement.

 

SOCIAL HISTORY:  No alcohol.  No drugs.  Former smoker, quit in 1997.



FAMILY HISTORY: no renal disease in family

 

CURRENT MEDICATIONS:  Allopurinol 100 mg a day, Plavix 75 mg a day, Protonix

40 mg a day, Pravachol 20 mg daily, vitamin D 2000 units daily, vitamin C 500

mg p.o. t.i.d. with meals, iron 325 p.o. t.i.d. with meals, Coreg 3.125 p.o.

b.i.d., sodium bicarbonate 650 mg p.o. t.i.d., Prograf 4 mg p.o. b.i.d.,

Elavil 150 mg at night, and sliding scale insulin with an insulin drip.

 

REVIEW OF SYSTEMS:  Positive fatigue.  Positive decreased appetite.  No

nausea or vomiting.  No chest pain.  Positive shortness of breath with

overexertion.  Positive decreased urination.  Positive pruritus.  No fevers

or chills.  No blurry vision.  Positive ambulatory dysfunction secondary to

cerebral palsy.  All other review of systems otherwise negative.

 

PHYSICAL EXAMINATION:

VITAL SIGNS:  Temperature 36.5, pulse 66, respiratory rate 16, blood pressure

109/68, and satting 99% on room air.

GENERAL:  Awake, alert, and oriented x3.

EYES:  No scleral icterus.

ENT:  Moist mucous membranes.

NECK:  Supple.

PULMONARY:  Clear to auscultation.

CARDIAC:  Regular rate and rhythm.

ABDOMEN:  Bowel sounds positive.  Soft, nontender, and nondistended.

EXTREMITIES:  No significant clubbing, cyanosis or edema.

NEUROLOGICALLY:  Right-sided weakness with underlying cerebral palsy.

DERMATOLOGIC:  No rash or ulcers noted.

 

LABORATORY DATA:  Sodium level is 134, potassium 4.7, chloride is 107, bicarb

is 18, BUN is 71, creatinine is 5.77, glucose was 496, calcium 6.4, and

phosphorus 5.9.  Albumin 2.9.  White count 6, H&H 9 and 27, and platelet

count is 204.  INR is 1.1.

 

IMPRESSION AND PLAN:

1.  Chronic kidney disease stage V with progression of chronic kidney disease

to the point now that the patient requires dialysis, currently n.p.o. for a

tunneled dialysis catheter today, which will be subsequently followed by 2

hours of dialysis treatment to help with clearance of toxins.

2.  Anemia.  Hemoglobin level 9.4.  We will check iron sats and ferritin

levels, may benefit from initiation of Procrit.

3.  Renal osteodystrophy.  Phosphorus levels are quite elevated.  We will

start the patient on PhosLo 1 p.o. t.i.d. with meals.  Calcium levels are low

at 6.4 and appear to be dropping in the setting of chronic hyperphosphatemia

and I have given the patient 2 grams of calcium gluconate.  Calcium levels

should start to improve as phosphorus levels improve.

4.  Metabolic acidosis.  The patient is on sodium bicarbonate, which will be

stopped secondary to initiation of dialysis.

5.  Case management.  The patient needs set up at the [facility]

[facility] and we will touch base with case management to help have the

patient get approved to that unit.

 

I appreciate the consultation.

 

 

 

KIKE

## 2017-12-21 NOTE — SURGERY CONSULTATION
Consultation


Date of Service


Dec 21, 2017.





Chief Complaint


Acute renal failure





History of Present Illness


The patient is a 62 year old male with acute renal failure.  Recommended 

permcath for dialysis at this time.





Vitals





Vital Signs Past 12 Hours








  Date Time  Temp Pulse Resp B/P (MAP) Pulse Ox O2 Delivery O2 Flow Rate FiO2


 


12/21/17 12:08      Room Air  


 


12/21/17 12:01 36.5 68 16 127/81 (96) 100   


 


12/21/17 08:22 36.7 65 16 107/65 (79) 99 Room Air  


 


12/21/17 08:00      Room Air  


 


12/21/17 04:00      Room Air  


 


12/21/17 03:49 36.5 66 16 109/68 (82) 99 Room Air  











Allergies


Coded Allergies:  


     No Known Allergies (Unverified , 12/20/17)





Home Medications


Scheduled


Allopurinol (Allopurinol), 1 TAB PO DAILY


Amitriptyline Hcl (Amitriptyline Hcl), 1 TAB PO HS


Ascorbic Acid (Ascorbic Acid), 500 MG PO TIDM


Carvedilol (Carvedilol), 1 TAB PO BID


Cholecalciferol (D3 2000), 1 TAB PO DAILY


Clopidogrel (Plavix), 75 MG PO DAILY


Entecavir (Entecavir), 1 TAB PO DAILY


Ferrous Sulfate (Ferrous Sulfate), 1 TAB PO TIDM


Insulin Aspart (Novolog Flexpen), 10 UNITS PO with lunch


Insulin Aspart (Novolog), 12 PO with dinner


Insulin Aspart (Novolog Flexpen), 8 UNITS SUBD with breakfast


Insulin Glargine (Lantus), 30 SC QPM


Pantoprazole (Protonix), 40 MG PO DAILY


Pravastatin (Pravachol ), 20 MG PO DAILY


Sodium Bicarbonate (Antacid) (Sodium Bicarbonate), 1 TAB PO TID


Tacrolimus (Prograf), 4 CAP PO BID





Problem List


Medical Problems:


(1) Cerebral palsy


(2) Diabetes mellitus type II, uncontrolled


(3) End stage chronic kidney disease


(4) Hepatitis B


(5) Presence of stent in coronary artery in patient with coronary artery disease


Surgical Problems:


(1) S/P liver transplant








Surgical / Medical History


Past Medical/Surgical History:  Diabetes, Kidney Disease





Social History


Smoking Status:  Former Smoker (Quit in 1990s)





Review of Systems


Constitutional:  No chills, No diaphoresis, No fever, No malaise, No weakness, 

No weight gain, No weight loss, No sweats, No fatigue, No problem reported


Respiratory:  No cough, No cyanosis, No MCCARTHY, No hemoptysis, No orthopnea, No PND

, No short of breath, No sputum production, No stridor, No wheezing, No dyspnea

, No problem reported


Cardiovascular:  No chest pain, No chest tightness, No chest pressure, No 

palpitations, No syncope, No diaphoresis, No edema, No intermittent claudication

, No orthopnea, No cyanosis, No mumur, No lightheadedness, No paroxysmal 

nocturnal dyspnea, No problem reported


Gastrointestinal:  No abdominal pain, No constipation, No diarrhea, No nausea, 

No vomiting, No anorexia, No appetite changes, No belching, No flatulence, No 

food intolerance, No hematemesis, No hemorrhoids, No hematochezia, No stool 

changes, No heartburn, No indigestion, No dysphagia, No rectal bleeding, No 

problem reported


Neurologic:  No dizziness, No weakness, No headache, No lethargy, No numbness, 

No paresthesia, No pre-existing deficit, No seizures, No tics, No tingling, No 

tremors, No vertigo, No memory loss, No LOC, No problem reported





Physical Exam


Constitutional:  


   General Apperance:  heathly-appearing, well-nourished, well-developed


   Level of Distress:  NAD


   Ambulation:  ambulating normally


Psychiatric:  


   Mental Status:  active & alert, normal mood, normal affect


   Orientation:  oriented except where noted


   Memory:  recent memory normal, remote memory normal


Head:  normocephalic


Lungs:  


   Auscultation:  breath sounds normal, CTA except as noted, no wheezing, no 

rales/crackles, no rhonchi


Cardiovascular:  


   Heart Auscultation:  RRR


Peripheral Pulses:  


   Radial Pulse:  normal on the left, normal on the right


   Femoral Pulse:  normal on the left, normal on the right


Abdomen:  


   Inspection & Palpation:  soft


Musculoskeletal:  normal


Extremities:  


   Upper Right:  no cyanosis, no edema, no varicosities, no palpable cord, no 

clubbing, no ulcers, no mottling


   Upper Left:  no cyanosis, no edema, no palpable cord, no clubbing, no ulcers

, no mottling


   Lower Right:  no cyanosis, no edema, no varicosities, no palpable cord, no 

clubbing, no ulcers, no mottling


   Lower Left:  no cyanosis, no edema, no varicosities, no palpable cord, no 

clubbing, no ulcers, no mottling





Assessment and Plan


Imp:


   Acute renal failure





Plan:


   Recommend insertion of permcath I have discussed the risks options and 

benefits of the procedure with the patient.  The patient understands the risks 

options and benefits and agrees to the procedure.

## 2017-12-22 VITALS — HEART RATE: 72 BPM | SYSTOLIC BLOOD PRESSURE: 127 MMHG | DIASTOLIC BLOOD PRESSURE: 88 MMHG

## 2017-12-22 VITALS
TEMPERATURE: 99.68 F | SYSTOLIC BLOOD PRESSURE: 114 MMHG | HEART RATE: 78 BPM | OXYGEN SATURATION: 96 % | DIASTOLIC BLOOD PRESSURE: 86 MMHG

## 2017-12-22 VITALS — SYSTOLIC BLOOD PRESSURE: 127 MMHG | HEART RATE: 71 BPM | TEMPERATURE: 97.7 F | DIASTOLIC BLOOD PRESSURE: 92 MMHG

## 2017-12-22 VITALS
HEART RATE: 70 BPM | SYSTOLIC BLOOD PRESSURE: 108 MMHG | TEMPERATURE: 97.88 F | OXYGEN SATURATION: 99 % | DIASTOLIC BLOOD PRESSURE: 67 MMHG

## 2017-12-22 VITALS — SYSTOLIC BLOOD PRESSURE: 134 MMHG | DIASTOLIC BLOOD PRESSURE: 85 MMHG | HEART RATE: 67 BPM

## 2017-12-22 VITALS
TEMPERATURE: 98.6 F | OXYGEN SATURATION: 96 % | SYSTOLIC BLOOD PRESSURE: 128 MMHG | DIASTOLIC BLOOD PRESSURE: 75 MMHG | HEART RATE: 86 BPM

## 2017-12-22 VITALS
SYSTOLIC BLOOD PRESSURE: 139 MMHG | OXYGEN SATURATION: 97 % | TEMPERATURE: 98.42 F | HEART RATE: 68 BPM | DIASTOLIC BLOOD PRESSURE: 84 MMHG

## 2017-12-22 VITALS — HEART RATE: 78 BPM | DIASTOLIC BLOOD PRESSURE: 83 MMHG | SYSTOLIC BLOOD PRESSURE: 107 MMHG

## 2017-12-22 VITALS — SYSTOLIC BLOOD PRESSURE: 130 MMHG | DIASTOLIC BLOOD PRESSURE: 92 MMHG | HEART RATE: 72 BPM

## 2017-12-22 VITALS — SYSTOLIC BLOOD PRESSURE: 141 MMHG | DIASTOLIC BLOOD PRESSURE: 90 MMHG | HEART RATE: 70 BPM

## 2017-12-22 VITALS — DIASTOLIC BLOOD PRESSURE: 67 MMHG | HEART RATE: 77 BPM | SYSTOLIC BLOOD PRESSURE: 124 MMHG

## 2017-12-22 VITALS
TEMPERATURE: 98.42 F | OXYGEN SATURATION: 95 % | DIASTOLIC BLOOD PRESSURE: 64 MMHG | HEART RATE: 80 BPM | SYSTOLIC BLOOD PRESSURE: 100 MMHG

## 2017-12-22 VITALS — SYSTOLIC BLOOD PRESSURE: 139 MMHG | DIASTOLIC BLOOD PRESSURE: 85 MMHG | HEART RATE: 68 BPM

## 2017-12-22 VITALS — HEART RATE: 67 BPM | TEMPERATURE: 97.7 F | SYSTOLIC BLOOD PRESSURE: 152 MMHG | DIASTOLIC BLOOD PRESSURE: 95 MMHG

## 2017-12-22 VITALS — SYSTOLIC BLOOD PRESSURE: 125 MMHG | HEART RATE: 67 BPM | DIASTOLIC BLOOD PRESSURE: 84 MMHG

## 2017-12-22 VITALS
TEMPERATURE: 98.42 F | SYSTOLIC BLOOD PRESSURE: 146 MMHG | OXYGEN SATURATION: 100 % | DIASTOLIC BLOOD PRESSURE: 82 MMHG | HEART RATE: 76 BPM

## 2017-12-22 VITALS — DIASTOLIC BLOOD PRESSURE: 76 MMHG | HEART RATE: 67 BPM | SYSTOLIC BLOOD PRESSURE: 125 MMHG

## 2017-12-22 VITALS — SYSTOLIC BLOOD PRESSURE: 159 MMHG | HEART RATE: 66 BPM | DIASTOLIC BLOOD PRESSURE: 94 MMHG

## 2017-12-22 LAB
ANION GAP SERPL CALC-SCNC: 10 MMOL/L (ref 3–11)
BUN SERPL-MCNC: 42 MG/DL (ref 7–18)
BUN/CREAT SERPL: 10 (ref 10–20)
CALCIUM SERPL-MCNC: 7.2 MG/DL (ref 8.5–10.1)
CHLORIDE SERPL-SCNC: 109 MMOL/L (ref 98–107)
CO2 SERPL-SCNC: 20 MMOL/L (ref 21–32)
CREAT CL PREDICTED SERPL C-G-VRATE: 16.1 ML/MIN
CREAT SERPL-MCNC: 4.15 MG/DL (ref 0.6–1.4)
EOSINOPHIL NFR BLD AUTO: 185 K/UL (ref 130–400)
FERRITIN SERPL-MCNC: 136.8 NG/ML (ref 8–388)
GLUCOSE SERPL-MCNC: 199 MG/DL (ref 70–99)
HCT VFR BLD CALC: 26.3 % (ref 42–52)
MAGNESIUM SERPL-MCNC: 1.7 MG/DL (ref 1.8–2.4)
MCH RBC QN AUTO: 33 PG (ref 25–34)
MCHC RBC AUTO-ENTMCNC: 34.6 G/DL (ref 32–36)
MCV RBC AUTO: 95.3 FL (ref 80–100)
PHOSPHATE SERPL-MCNC: 4.8 MG/DL (ref 2.5–4.9)
PMV BLD AUTO: 9.3 FL (ref 7.4–10.4)
POTASSIUM SERPL-SCNC: 3.8 MMOL/L (ref 3.5–5.1)
RBC # BLD AUTO: 2.76 M/UL (ref 4.7–6.1)
SODIUM SERPL-SCNC: 139 MMOL/L (ref 136–145)
TIBC SERPL-MCNC: 204 MCG/DL (ref 250–450)
WBC # BLD AUTO: 7.08 K/UL (ref 4.8–10.8)

## 2017-12-22 RX ADMIN — TACROLIMUS SCH MG: 1 CAPSULE ORAL at 08:27

## 2017-12-22 RX ADMIN — AMITRIPTYLINE HYDROCHLORIDE SCH MG: 50 TABLET, FILM COATED ORAL at 21:01

## 2017-12-22 RX ADMIN — OXYCODONE HYDROCHLORIDE AND ACETAMINOPHEN SCH MG: 500 TABLET ORAL at 12:52

## 2017-12-22 RX ADMIN — INSULIN ASPART SCH UNITS: 100 INJECTION, SOLUTION INTRAVENOUS; SUBCUTANEOUS at 08:32

## 2017-12-22 RX ADMIN — INSULIN GLARGINE SCH UNITS: 100 INJECTION, SOLUTION SUBCUTANEOUS at 09:00

## 2017-12-22 RX ADMIN — INSULIN ASPART SCH UNITS: 100 INJECTION, SOLUTION INTRAVENOUS; SUBCUTANEOUS at 00:51

## 2017-12-22 RX ADMIN — INSULIN ASPART SCH UNITS: 100 INJECTION, SOLUTION INTRAVENOUS; SUBCUTANEOUS at 11:00

## 2017-12-22 RX ADMIN — TACROLIMUS SCH MG: 1 CAPSULE ORAL at 21:01

## 2017-12-22 RX ADMIN — PANTOPRAZOLE SCH MG: 40 TABLET, DELAYED RELEASE ORAL at 08:26

## 2017-12-22 RX ADMIN — CLOPIDOGREL BISULFATE SCH MG: 75 TABLET, FILM COATED ORAL at 12:52

## 2017-12-22 RX ADMIN — OXYCODONE HYDROCHLORIDE AND ACETAMINOPHEN SCH MG: 500 TABLET ORAL at 08:27

## 2017-12-22 RX ADMIN — CARVEDILOL SCH MG: 3.12 TABLET, FILM COATED ORAL at 21:01

## 2017-12-22 RX ADMIN — INSULIN ASPART SCH UNITS: 100 INJECTION, SOLUTION INTRAVENOUS; SUBCUTANEOUS at 17:09

## 2017-12-22 RX ADMIN — PRAVASTATIN SODIUM SCH MG: 20 TABLET ORAL at 08:27

## 2017-12-22 RX ADMIN — INSULIN GLARGINE SCH UNITS: 100 INJECTION, SOLUTION SUBCUTANEOUS at 21:03

## 2017-12-22 RX ADMIN — INSULIN ASPART SCH UNITS: 100 INJECTION, SOLUTION INTRAVENOUS; SUBCUTANEOUS at 04:19

## 2017-12-22 RX ADMIN — CALCIUM ACETATE SCH MG: 667 CAPSULE ORAL at 08:26

## 2017-12-22 RX ADMIN — CALCIUM ACETATE SCH MG: 667 CAPSULE ORAL at 17:06

## 2017-12-22 RX ADMIN — ALLOPURINOL SCH MG: 100 TABLET ORAL at 08:26

## 2017-12-22 RX ADMIN — INSULIN ASPART SCH UNITS: 100 INJECTION, SOLUTION INTRAVENOUS; SUBCUTANEOUS at 21:03

## 2017-12-22 RX ADMIN — Medication SCH INTER.UNIT: at 08:26

## 2017-12-22 RX ADMIN — CARVEDILOL SCH MG: 3.12 TABLET, FILM COATED ORAL at 08:12

## 2017-12-22 RX ADMIN — OXYCODONE HYDROCHLORIDE AND ACETAMINOPHEN SCH MG: 500 TABLET ORAL at 17:07

## 2017-12-22 RX ADMIN — CALCIUM ACETATE SCH MG: 667 CAPSULE ORAL at 12:52

## 2017-12-22 NOTE — PHARMACY PROGRESS NOTE
Pharmacy Glycemic Short Note 2


Date of Service


Dec 22, 2017.





OUTPATIENT ANTIDIABETIC REGIMEN: 


* Lantus 30 units SQ qPM


* Novolog TID with meals 


 * 8 units with breakfast


 * 10 units with lunch


 * 12 units with supper








ASSESSMENT:


* Mr Lara is a 61yo diabetic male admitted with CKD stage 5, to initiate 

dialysis.


* Patient is POD #1 s/p dialysis cath placement.  


* Patient was transitioned from IV to SQ insulin yesterday.  BSGs have been 

somewhat elevated, but hesitate to be too aggressive with his regimen, as he is 

starting dialysis for the first time.


* Pt is ordered a Type 2 diabetic/Renal diet.








PLAN FOR INPATIENT GLYCEMIC CONTROL:


* Basal insulin


 * Lantus 10 units SQ BID 





* Bolus insulin


 * NovoLog per scale ACHS or Q6hrs while NPO


 * Goal Range:  Low 140 mg/dL - High 180 mg/dL


 * Correction Factor:  20 mg/dL/unit


 * Nutritional / Prandial insulin per carb ratio of  1 unit per 10 grams CHO 

consumed








PLAN FOR DISCHARGE:


* It's possible that patient's regimen will require adjustment once they are 

receiving regular dialysis.


* Will follow-up closer to discharge.

## 2017-12-22 NOTE — PROGRESS NOTE
Internal Med Progress Note


Date of Service:


Dec 22, 2017.


Provider Documentation:





SUBJECTIVE:


Seen and examined at bedside


Got dialysis today


Also got IV Venofer


States Itchiness resolved


No other complaints











OBJECTIVE:





Vital Signs-as noted below





Physical Exam:





General Appearance:Moderately built and nourished, no apparent distress


Head:  normocephalic, Atraumatic 


Eyes:  normal inspection, EOMI, PERRL


Neck:  supple, Trachea midline


Respiratory/Chest: Normal breath sounds, CTA


Cardiovascular: S1, S2, No murmur


Abdomen/GI:Soft, Non tender, Bowel sounds present


Extremities/Musculoskelatal:normal inspection, no edema 


Neurologic/Psych:AAOX3, grossly no focal neurological deficits 


Skin:  normal color, warm








Lab data as noted below.


ASSESSMENT & PLAN:





Patient is a 62yr male with PMH of ESRD, poorly controlled DM II, cerebral palsy

, CAD (s/p stents in 2011) and s/p liver transplant (1998) who was admitted 

directly from 's nephrology clinic for initiation of dialysis. 








End stage Renal Disease:


Started on Dialysis 


Cr Improved


Got Tunneled Catheter placement on 12/21/17


Appreciate Nephrology help 


Monitor renal function








Uncontrolled DM II:


Last A1C:9.6 in Dec, 2017


A1C:8.9


Blood glucose levels in 400s on presentation


Blood sugar levels improved


DC Insulin drip


ISS, lantus


Pharmacy Glycemic control consulted


Pharmacy to manage Insulin








Anemia of Chronic Disease:


No acute bleeding issues


Monitor Hb


Hb:9.1  today


Got IV Venofer








CAD S/P stents  


H/o MIs in 2009 and 2011


No acute issues  


Continue coreg, statin, Plavix








S/p liver transplant: 


H/o Hepatitic B 


Continue Prograf, Entecavir 








DVT Px:


SCDs 





Code status:


 FULL 





PCP: Stephan 





Disposition:


Plan to discharge home when stable


Needs outpatient dialysis setup prior to discharge


Vital Signs:











  Date Time  Temp Pulse Resp B/P (MAP) Pulse Ox O2 Delivery O2 Flow Rate FiO2


 


12/22/17 11:15  78  107/83    


 


12/22/17 11:00  77  124/67    


 


12/22/17 10:45  72  127/88    


 


12/22/17 10:30  67  125/76    


 


12/22/17 10:15  68  139/85    


 


12/22/17 10:00  67  134/85    


 


12/22/17 09:45  67  125/84    


 


12/22/17 09:30  70  141/90    


 


12/22/17 09:15  66  159/94    


 


12/22/17 09:00  72  130/92    


 


12/22/17 08:43 36.5 71  127/92 (104)    


 


12/22/17 07:55 36.9 76 20 146/82 (103) 100 Room Air  


 


12/22/17 04:00      Room Air  


 


12/22/17 03:36 36.6 70 18 108/67 (81) 99 Room Air  


 


12/21/17 23:59      Room Air  


 


12/21/17 23:48 37.2 72 16 129/77 (94) 97 Room Air  


 


12/21/17 20:43 36.2 64  152/84 (106)    


 


12/21/17 20:30  65  140/89    


 


12/21/17 20:15  67  140/94    


 


12/21/17 20:00      Room Air  


 


12/21/17 20:00  60  132/84    


 


12/21/17 19:45  67  141/91    


 


12/21/17 19:30  64  151/82    


 


12/21/17 19:15  65  162/94    


 


12/21/17 19:00  67  150/91    


 


12/21/17 19:00 36.1 67 18 146/95 (112) 100 Room Air  


 


12/21/17 18:45  60  148/78    


 


12/21/17 18:30  64  157/91    


 


12/21/17 18:15 36.1 62  168/92 (117)    


 


12/21/17 17:14  83 20 158/90 (112) 100 Room Air  


 


12/21/17 16:43  73 20 153/81 (105) 100 Room Air  


 


12/21/17 16:28  78 16 126/83 (97) 100 Room Air  


 


12/21/17 16:15      Room Air  


 


12/21/17 16:15 37.0 69 16 140/90 (107) 100 Room Air  


 


12/21/17 15:52  68 16     


 


12/21/17 15:52  68 16  98   


 


12/21/17 15:51    127/80    


 


12/21/17 15:47  66 14     


 


12/21/17 15:47  66 14  99   


 


12/21/17 15:46    123/77    


 


12/21/17 15:44  67 16  99   


 


12/21/17 15:44  64 16     


 


12/21/17 15:41    127/77    


 


12/21/17 15:39  64 16     


 


12/21/17 15:39  68 16  99   


 


12/21/17 15:38  66 16     


 


12/21/17 15:38  68 16  100   


 


12/21/17 15:36    129/79    


 


12/21/17 15:33  70 16  100   


 


12/21/17 15:33  67 16     


 


12/21/17 15:31    122/78    


 


12/21/17 15:29    118/78    


 


12/21/17 15:28  71   100   


 


12/21/17 15:28  71      


 


12/21/17 15:28 36.8 70 18 118/78 100 Room Air  








Lab Results:





Results Past 24 Hours








Test


  12/21/17


15:37 12/21/17


16:23 12/21/17


16:39 12/21/17


20:22 Range/Units


 


 


Bedside Glucose 176 163  198 70-99  mg/dl


 


Hepatitis B Surface Antigen   NEG  NEG  


 


Hepatitis C Antibody   NEG  NEG  


 


Test


  12/22/17


00:25 12/22/17


03:36 12/22/17


05:19 12/22/17


06:43 Range/Units


 


 


Bedside Glucose 265 222  175 70-99  mg/dl


 


White Blood Count   7.08  4.8-10.8  K/uL


 


Red Blood Count   2.76  4.7-6.1  M/uL


 


Hemoglobin   9.1  14.0-18.0  g/dL


 


Hematocrit   26.3  42-52  %


 


Mean Corpuscular Volume   95.3    fL


 


Mean Corpuscular Hemoglobin   33.0  25-34  pg


 


Mean Corpuscular Hemoglobin


Concent 


  


  34.6


  


  32-36  g/dl


 


 


RDW Standard Deviation   45.0  36.4-46.3  fL


 


RDW Coefficient of Variation   13.1  11.5-14.5  %


 


Platelet Count   185  130-400  K/uL


 


Mean Platelet Volume   9.3  7.4-10.4  fL


 


Sodium Level   139  136-145  mmol/L


 


Potassium Level   3.8  3.5-5.1  mmol/L


 


Chloride Level   109    mmol/L


 


Carbon Dioxide Level   20  21-32  mmol/L


 


Anion Gap   10.0  3-11  mmol/L


 


Blood Urea Nitrogen   42  7-18  mg/dl


 


Creatinine


  


  


  4.15


  


  0.60-1.40


mg/dl


 


Est Creatinine Clear Calc


Drug Dose 


  


  16.1


  


   ml/min


 


 


Estimated GFR (


American) 


  


  16.5


  


   


 


 


Estimated GFR (Non-


American 


  


  14.3


  


   


 


 


BUN/Creatinine Ratio   10.0  10-20  


 


Random Glucose   199  70-99  mg/dl


 


Calcium Level   7.2  8.5-10.1  mg/dl


 


Phosphorus Level   4.8  2.5-4.9  mg/dl


 


Magnesium Level   1.7  1.8-2.4  mg/dl


 


Iron Level   45    mcg/dl


 


Total Iron Binding Capacity   204  250-450  mcg/dl


 


Transferrin   179  200-360  mg/dl


 


Transferrin % Saturation   18  20-50  %


 


Ferritin


  


  


  136.8


  


  8.0-388.0


ng/ml


 


Test


  12/22/17


10:22 


  


  


  Range/Units


 


 


Hepatitis B Surface Antibody NEG

## 2017-12-22 NOTE — DIALYSIS PROGRESS NOTE
Nephrology Dialysis Note


Date of Service:


Dec 22, 2017.


Subjective


63 yo male seen on dialysis. tolerated short dialysis treatment yesterday. had 

tunneled line placed yesterday.  pts appetite already improving.  urination has 

also improved.  was previously just dribbling out.  no sob.





Objective











  Date Time  Temp Pulse Resp B/P (MAP) Pulse Ox O2 Delivery O2 Flow Rate FiO2


 


12/22/17 08:43 36.5 71  127/92 (104)    


 


12/22/17 07:55 36.9 76 20 146/82 (103) 100 Room Air  


 


12/22/17 04:00      Room Air  


 


12/22/17 03:36 36.6 70 18 108/67 (81) 99 Room Air  


 


12/21/17 23:59      Room Air  


 


12/21/17 23:48 37.2 72 16 129/77 (94) 97 Room Air  


 


12/21/17 20:43 36.2 64  152/84 (106)    


 


12/21/17 20:30  65  140/89    


 


12/21/17 20:15  67  140/94    


 


12/21/17 20:00      Room Air  


 


12/21/17 20:00  60  132/84    


 


12/21/17 19:45  67  141/91    


 


12/21/17 19:30  64  151/82    


 


12/21/17 19:15  65  162/94    


 


12/21/17 19:00  67  150/91    


 


12/21/17 19:00 36.1 67 18 146/95 (112) 100 Room Air  


 


12/21/17 18:45  60  148/78    


 


12/21/17 18:30  64  157/91    


 


12/21/17 18:15 36.1 62  168/92 (117)    


 


12/21/17 17:14  83 20 158/90 (112) 100 Room Air  


 


12/21/17 16:43  73 20 153/81 (105) 100 Room Air  


 


12/21/17 16:28  78 16 126/83 (97) 100 Room Air  


 


12/21/17 16:15      Room Air  


 


12/21/17 16:15 37.0 69 16 140/90 (107) 100 Room Air  


 


12/21/17 15:52  68 16     


 


12/21/17 15:52  68 16  98   


 


12/21/17 15:51    127/80    


 


12/21/17 15:47  66 14     


 


12/21/17 15:47  66 14  99   


 


12/21/17 15:46    123/77    


 


12/21/17 15:44  67 16  99   


 


12/21/17 15:44  64 16     


 


12/21/17 15:41    127/77    


 


12/21/17 15:39  64 16     


 


12/21/17 15:39  68 16  99   


 


12/21/17 15:38  66 16     


 


12/21/17 15:38  68 16  100   


 


12/21/17 15:36    129/79    


 


12/21/17 15:33  70 16  100   


 


12/21/17 15:33  67 16     


 


12/21/17 15:31    122/78    


 


12/21/17 15:29    118/78    


 


12/21/17 15:28  71   100   


 


12/21/17 15:28  71      


 


12/21/17 15:28 36.8 70 18 118/78 100 Room Air  


 


12/21/17 12:08      Room Air  


 


12/21/17 12:01 36.5 68 16 127/81 (96) 100   








Physical Exam:


General-aaox3


Eyes-no scleral icterus


ENT-mmm


Neck-supple


Lungs-cta


Heart-rrr


Abdomen-bs+ s/nt/nd


Extremities-no c/c/e


Neuro-hx of cp





Current Inpatient Medications








 Medications


  (Trade)  Dose


 Ordered  Sig/Basilia


 Route  Start Time


 Stop Time Status Last Admin


Dose Admin


 


 Glucose


  (Glucose 40% Gel)  15-30


 GRAMS 15


 GRAMS...  UD  PRN


 PO  12/20/17 17:30


 1/19/18 17:29   


 


 


 Glucose


  (Glucose Chew


 Tab)  4-8


 Tablets 4


 Tabl...  UD  PRN


 PO  12/20/17 17:30


 1/19/18 17:29   


 


 


 Dextrose


  (Dextrose 50%


 50ML Syringe)  25-50ML OF


 50% DW IV


 FOR...  UD  PRN


 IV  12/20/17 17:30


 1/19/18 17:29  12/21/17 06:43


25 ML


 


 Glucagon


  (Glucagon Inj)  1 mg  UD  PRN


 SQ  12/20/17 17:30


 1/19/18 17:29   


 


 


 Allopurinol


  (Zyloprim Tab)  100 mg  DAILY


 PO  12/21/17 09:00


 1/20/18 08:59  12/22/17 08:26


100 MG


 


 Ascorbic Acid


  (Vitamin C Tab)  500 mg  TIDM


 PO  12/21/17 07:30


 1/20/18 07:29  12/22/17 08:27


500 MG


 


 Carvedilol


  (Coreg Tab)  3.125 mg  BID


 PO  12/20/17 21:00


 1/19/18 20:59  12/21/17 21:15


3.125 MG


 


 Clopidogrel


 Bisulfate


  (plAVix TAB)  75 mg  DAILY


 PO  12/21/17 09:00


 1/20/18 08:59 Future hold  


 


 


 Pantoprazole


 Sodium


  (Protonix Tab)  40 mg  DAILY


 PO  12/21/17 09:00


 1/20/18 08:59  12/22/17 08:26


40 MG


 


 Pravastatin Sodium


  (Pravachol Tab)  20 mg  DAILY


 PO  12/21/17 09:00


 1/20/18 08:59  12/22/17 08:27


20 MG


 


 Tacrolimus


  (Prograf Cap)  4 mg  BID


 PO  12/20/17 21:00


 1/19/18 20:59  12/22/17 08:27


4 MG


 


 Amitriptyline HCl


  (Elavil Tab)  150 mg  HS


 PO  12/20/17 21:00


 1/19/18 20:59  12/21/17 21:15


150 MG


 


 Cholecalciferol


  (Vitamin D Tab)  2,000


 inter.unit  DAILY


 PO  12/21/17 09:00


 1/20/18 08:59  12/22/17 08:26


2,000 INTER.UNIT


 


 Miscellaneous


 Information


  (Order Awaiting


 Action)  1 ea  QS


 N/A  12/21/17 00:00


 1/20/18 00:00   


 


 


 Miscellaneous


 Information


  (Consult


 Glycemic


 Management


 Pharmacy)  1 ea  UD  PRN


 N/A  12/20/17 19:11


 1/19/18 19:10   


 


 


 Calcium Acetate


  (Phoslo Cap)  1,334 mg  TIDM


 PO  12/21/17 07:30


 1/20/18 07:29  12/22/17 08:26


1,334 MG


 


 Insulin Aspart


  (novoLOG ASPART)  SLIDING


 SCALE  ACHS


 SC  12/22/17 03:00


 1/21/18 02:59  12/22/17 08:32


2 UNITS


 


 Iron Sucrose 100


 mg/Syringe  5 ml @ 0


 mls/min  TODAY@0800


 IV  12/22/17 08:00


 12/22/17 18:00  12/22/17 09:05


2.5 MLS/MIN


 


 Epoetin Den 4000


 units/Syringe  0.2 ml @ 1


 mls/min  TODAY@0800


 IV.  12/22/17 08:00


 12/22/17 18:00  12/22/17 09:05


1 MLS/MIN


 


 Insulin Glargine


  (Lantus Solostar


 Pen)  10 units  BID


 SC  12/22/17 09:00


 1/21/18 08:59   


 











Last 24 Hours








Test


  12/21/17


09:31 12/21/17


10:35 12/21/17


15:37 12/21/17


16:23


 


Bedside Glucose 124 mg/dl  158 mg/dl  176 mg/dl  163 mg/dl 


 


Test


  12/21/17


16:39 12/21/17


20:22 12/22/17


00:25 12/22/17


03:36


 


Hepatitis B Surface Antigen NEG    


 


Hepatitis C Antibody NEG    


 


Bedside Glucose  198 mg/dl  265 mg/dl  222 mg/dl 


 


Test


  12/22/17


05:19 


  


  


 


 


White Blood Count 7.08 K/uL    


 


Red Blood Count 2.76 M/uL    


 


Hemoglobin 9.1 g/dL    


 


Hematocrit 26.3 %    


 


Mean Corpuscular Volume 95.3 fL    


 


Mean Corpuscular Hemoglobin 33.0 pg    


 


Mean Corpuscular Hemoglobin


Concent 34.6 g/dl 


  


  


  


 


 


RDW Standard Deviation 45.0 fL    


 


RDW Coefficient of Variation 13.1 %    


 


Platelet Count 185 K/uL    


 


Mean Platelet Volume 9.3 fL    


 


Sodium Level 139 mmol/L    


 


Potassium Level 3.8 mmol/L    


 


Chloride Level 109 mmol/L    


 


Carbon Dioxide Level 20 mmol/L    


 


Anion Gap 10.0 mmol/L    


 


Blood Urea Nitrogen 42 mg/dl    


 


Creatinine 4.15 mg/dl    


 


Est Creatinine Clear Calc


Drug Dose 16.1 ml/min 


  


  


  


 


 


Estimated GFR (


American) 16.5 


  


  


  


 


 


Estimated GFR (Non-


American 14.3 


  


  


  


 


 


BUN/Creatinine Ratio 10.0    


 


Random Glucose 199 mg/dl    


 


Calcium Level 7.2 mg/dl    


 


Phosphorus Level 4.8 mg/dl    


 


Magnesium Level 1.7 mg/dl    


 


Iron Level 45 mcg/dl    


 


Total Iron Binding Capacity 204 mcg/dl    


 


Transferrin 179 mg/dl    


 


Transferrin % Saturation 18 %    


 


Ferritin 136.8 ng/ml    











Assessment & Plan


ESRD-doing 4k bath, 2.5 hours, 250/500, no fluid removal. seen on dialysis. 





anemia of renal failure-giving venofer and procrit today for goal hg of 10 to 11





CHAD-started on phoslo. calcium levels are trending back up. giving additional 

gram of calcium gluconate





Dialysis unit-needs accepted at the [facility] dialysis unit-[phone], 

preferably m-w-f.  aware.

## 2017-12-22 NOTE — ANESTHESIOLOGY PROGRESS NOTE
Anesthesia Post Op Note


Date & Time


Dec 22, 2017 at 07:58





Vital Signs


Pain Intensity:  0





Vital Signs Past 12 Hours








  Date Time  Temp Pulse Resp B/P (MAP) Pulse Ox O2 Delivery O2 Flow Rate FiO2


 


12/22/17 07:55 36.9 76 20 146/82 (103) 100 Room Air  


 


12/22/17 04:00      Room Air  


 


12/22/17 03:36 36.6 70 18 108/67 (81) 99 Room Air  


 


12/21/17 23:59      Room Air  


 


12/21/17 23:48 37.2 72 16 129/77 (94) 97 Room Air  


 


12/21/17 20:43 36.2 64  152/84 (106)    


 


12/21/17 20:30  65  140/89    


 


12/21/17 20:15  67  140/94    


 


12/21/17 20:00      Room Air  


 


12/21/17 20:00  60  132/84    











Notes


Mental Status:  alert / awake / arousable, participated in evaluation


Pt Amnestic to Procedure:  Yes


Nausea / Vomiting:  adequately controlled


Pain:  adequately controlled


Airway Patency, RR, SpO2:  stable & adequate


BP & HR:  stable & adequate


Hydration State:  stable & adequate


Anesthetic Complications:  no major complications apparent

## 2017-12-23 VITALS — SYSTOLIC BLOOD PRESSURE: 117 MMHG | DIASTOLIC BLOOD PRESSURE: 72 MMHG | HEART RATE: 72 BPM

## 2017-12-23 VITALS — SYSTOLIC BLOOD PRESSURE: 126 MMHG | DIASTOLIC BLOOD PRESSURE: 77 MMHG | TEMPERATURE: 98.06 F | HEART RATE: 71 BPM

## 2017-12-23 VITALS — HEART RATE: 67 BPM | SYSTOLIC BLOOD PRESSURE: 125 MMHG | DIASTOLIC BLOOD PRESSURE: 83 MMHG

## 2017-12-23 VITALS — HEART RATE: 74 BPM | DIASTOLIC BLOOD PRESSURE: 76 MMHG | SYSTOLIC BLOOD PRESSURE: 115 MMHG

## 2017-12-23 VITALS — SYSTOLIC BLOOD PRESSURE: 119 MMHG | HEART RATE: 69 BPM | DIASTOLIC BLOOD PRESSURE: 77 MMHG

## 2017-12-23 VITALS — SYSTOLIC BLOOD PRESSURE: 134 MMHG | DIASTOLIC BLOOD PRESSURE: 82 MMHG | HEART RATE: 72 BPM

## 2017-12-23 VITALS — SYSTOLIC BLOOD PRESSURE: 124 MMHG | HEART RATE: 71 BPM | DIASTOLIC BLOOD PRESSURE: 79 MMHG

## 2017-12-23 VITALS — SYSTOLIC BLOOD PRESSURE: 146 MMHG | DIASTOLIC BLOOD PRESSURE: 86 MMHG | HEART RATE: 73 BPM | TEMPERATURE: 98.06 F

## 2017-12-23 VITALS
TEMPERATURE: 98.06 F | HEART RATE: 78 BPM | OXYGEN SATURATION: 98 % | SYSTOLIC BLOOD PRESSURE: 111 MMHG | DIASTOLIC BLOOD PRESSURE: 65 MMHG

## 2017-12-23 VITALS — OXYGEN SATURATION: 99 %

## 2017-12-23 VITALS
HEART RATE: 80 BPM | DIASTOLIC BLOOD PRESSURE: 62 MMHG | OXYGEN SATURATION: 99 % | TEMPERATURE: 98.06 F | SYSTOLIC BLOOD PRESSURE: 105 MMHG

## 2017-12-23 VITALS
TEMPERATURE: 98.06 F | HEART RATE: 78 BPM | SYSTOLIC BLOOD PRESSURE: 111 MMHG | OXYGEN SATURATION: 98 % | DIASTOLIC BLOOD PRESSURE: 65 MMHG

## 2017-12-23 VITALS — DIASTOLIC BLOOD PRESSURE: 83 MMHG | SYSTOLIC BLOOD PRESSURE: 125 MMHG | HEART RATE: 68 BPM

## 2017-12-23 VITALS — HEART RATE: 67 BPM | DIASTOLIC BLOOD PRESSURE: 78 MMHG | SYSTOLIC BLOOD PRESSURE: 125 MMHG

## 2017-12-23 VITALS — DIASTOLIC BLOOD PRESSURE: 76 MMHG | HEART RATE: 66 BPM | SYSTOLIC BLOOD PRESSURE: 112 MMHG

## 2017-12-23 VITALS — HEART RATE: 50 BPM | DIASTOLIC BLOOD PRESSURE: 74 MMHG | SYSTOLIC BLOOD PRESSURE: 118 MMHG

## 2017-12-23 VITALS
TEMPERATURE: 97.88 F | DIASTOLIC BLOOD PRESSURE: 60 MMHG | OXYGEN SATURATION: 95 % | HEART RATE: 71 BPM | SYSTOLIC BLOOD PRESSURE: 96 MMHG

## 2017-12-23 VITALS — SYSTOLIC BLOOD PRESSURE: 126 MMHG | HEART RATE: 76 BPM | DIASTOLIC BLOOD PRESSURE: 82 MMHG

## 2017-12-23 VITALS — DIASTOLIC BLOOD PRESSURE: 84 MMHG | HEART RATE: 69 BPM | SYSTOLIC BLOOD PRESSURE: 129 MMHG

## 2017-12-23 LAB
ANION GAP SERPL CALC-SCNC: 8 MMOL/L (ref 3–11)
BUN SERPL-MCNC: 30 MG/DL (ref 7–18)
BUN/CREAT SERPL: 7.6 (ref 10–20)
CALCIUM SERPL-MCNC: 7.7 MG/DL (ref 8.5–10.1)
CHLORIDE SERPL-SCNC: 106 MMOL/L (ref 98–107)
CO2 SERPL-SCNC: 23 MMOL/L (ref 21–32)
CREAT CL PREDICTED SERPL C-G-VRATE: 17 ML/MIN
CREAT SERPL-MCNC: 4.02 MG/DL (ref 0.6–1.4)
GLUCOSE SERPL-MCNC: 176 MG/DL (ref 70–99)
HCT VFR BLD CALC: 27.1 % (ref 42–52)
MAGNESIUM SERPL-MCNC: 1.8 MG/DL (ref 1.8–2.4)
PHOSPHATE SERPL-MCNC: 3.3 MG/DL (ref 2.5–4.9)
POTASSIUM SERPL-SCNC: 4 MMOL/L (ref 3.5–5.1)
SODIUM SERPL-SCNC: 137 MMOL/L (ref 136–145)

## 2017-12-23 RX ADMIN — CARVEDILOL SCH MG: 3.12 TABLET, FILM COATED ORAL at 09:00

## 2017-12-23 RX ADMIN — INSULIN GLARGINE SCH UNITS: 100 INJECTION, SOLUTION SUBCUTANEOUS at 07:56

## 2017-12-23 RX ADMIN — ALLOPURINOL SCH MG: 100 TABLET ORAL at 07:50

## 2017-12-23 RX ADMIN — OXYCODONE HYDROCHLORIDE AND ACETAMINOPHEN SCH MG: 500 TABLET ORAL at 17:58

## 2017-12-23 RX ADMIN — CALCIUM ACETATE SCH MG: 667 CAPSULE ORAL at 07:51

## 2017-12-23 RX ADMIN — INSULIN ASPART SCH UNITS: 100 INJECTION, SOLUTION INTRAVENOUS; SUBCUTANEOUS at 07:55

## 2017-12-23 RX ADMIN — PANTOPRAZOLE SCH MG: 40 TABLET, DELAYED RELEASE ORAL at 07:51

## 2017-12-23 RX ADMIN — CLOPIDOGREL BISULFATE SCH MG: 75 TABLET, FILM COATED ORAL at 17:55

## 2017-12-23 RX ADMIN — CARVEDILOL SCH MG: 3.12 TABLET, FILM COATED ORAL at 20:45

## 2017-12-23 RX ADMIN — INSULIN ASPART SCH UNITS: 100 INJECTION, SOLUTION INTRAVENOUS; SUBCUTANEOUS at 16:30

## 2017-12-23 RX ADMIN — OXYCODONE HYDROCHLORIDE AND ACETAMINOPHEN SCH MG: 500 TABLET ORAL at 11:47

## 2017-12-23 RX ADMIN — INSULIN GLARGINE SCH UNITS: 100 INJECTION, SOLUTION SUBCUTANEOUS at 21:04

## 2017-12-23 RX ADMIN — AMITRIPTYLINE HYDROCHLORIDE SCH MG: 50 TABLET, FILM COATED ORAL at 20:49

## 2017-12-23 RX ADMIN — INSULIN ASPART SCH UNITS: 100 INJECTION, SOLUTION INTRAVENOUS; SUBCUTANEOUS at 11:49

## 2017-12-23 RX ADMIN — Medication SCH INTER.UNIT: at 07:51

## 2017-12-23 RX ADMIN — CALCIUM ACETATE SCH MG: 667 CAPSULE ORAL at 11:54

## 2017-12-23 RX ADMIN — CALCIUM ACETATE SCH MG: 667 CAPSULE ORAL at 17:59

## 2017-12-23 RX ADMIN — CLOPIDOGREL BISULFATE SCH MG: 75 TABLET, FILM COATED ORAL at 09:00

## 2017-12-23 RX ADMIN — INSULIN ASPART SCH UNITS: 100 INJECTION, SOLUTION INTRAVENOUS; SUBCUTANEOUS at 18:23

## 2017-12-23 RX ADMIN — TACROLIMUS SCH MG: 1 CAPSULE ORAL at 20:43

## 2017-12-23 RX ADMIN — TACROLIMUS SCH MG: 1 CAPSULE ORAL at 07:50

## 2017-12-23 RX ADMIN — PRAVASTATIN SODIUM SCH MG: 20 TABLET ORAL at 07:50

## 2017-12-23 RX ADMIN — CALCIUM ACETATE SCH MG: 667 CAPSULE ORAL at 11:47

## 2017-12-23 RX ADMIN — INSULIN ASPART SCH UNITS: 100 INJECTION, SOLUTION INTRAVENOUS; SUBCUTANEOUS at 21:00

## 2017-12-23 RX ADMIN — OXYCODONE HYDROCHLORIDE AND ACETAMINOPHEN SCH MG: 500 TABLET ORAL at 07:50

## 2017-12-23 RX ADMIN — OXYCODONE HYDROCHLORIDE AND ACETAMINOPHEN SCH MG: 500 TABLET ORAL at 11:54

## 2017-12-23 NOTE — NEPHROLOGY PROGRESS NOTE
Nephrology Progress Note


Date of Service:


Dec 23, 2017.


Subjective


tolerted HD ttodayy; no n/v/d/sob/conffusion





Objective











  Date Time  Temp Pulse Resp B/P (MAP) Pulse Ox O2 Delivery O2 Flow Rate FiO2


 


12/23/17 15:30  66  112/76    


 


12/23/17 15:15  67  125/78    


 


12/23/17 15:00  69  129/84    


 


12/23/17 14:45  67  125/83    


 


12/23/17 14:30  72  134/82    


 


12/23/17 14:18 36.7 71  126/77 (93)    


 


12/23/17 12:00     99 Room Air  


 


12/23/17 11:58 36.7 80 20 105/62 (76) 99 Room Air  


 


12/23/17 11:16 36.7 78 18  98   


 


12/23/17 08:05      Room Air  


 


12/23/17 07:27 36.7 78 18 111/65 (80) 98 Room Air  


 


12/23/17 04:00      Room Air  


 


12/23/17 03:58 36.6 71 16 96/60 (72) 95 Room Air  


 


12/23/17 00:00      Room Air  


 


12/22/17 23:56 36.9 80 18 100/64 (76) 95 Room Air  


 


12/22/17 20:00      Room Air  


 


12/22/17 19:06 37.6 78 16 114/86 (95) 96 Room Air  








Physical Exam:


General-aaox3, cachectic, lying flat in bed on ra


Eyes-no scleral icterus


ENT-mmm


Neck-supple


Lungs-cta


Heart-rrr


Abdomen-bs+ s/nt/nd


Extremities-no c/c/e


Neuro-hx of cp





Current Inpatient Medications








 Medications


  (Trade)  Dose


 Ordered  Sig/Basilia


 Route  Start Time


 Stop Time Status Last Admin


Dose Admin


 


 Glucose


  (Glucose 40% Gel)  15-30


 GRAMS 15


 GRAMS...  UD  PRN


 PO  12/20/17 17:30


 1/19/18 17:29   


 


 


 Glucose


  (Glucose Chew


 Tab)  4-8


 Tablets 4


 Tabl...  UD  PRN


 PO  12/20/17 17:30


 1/19/18 17:29   


 


 


 Dextrose


  (Dextrose 50%


 50ML Syringe)  25-50ML OF


 50% DW IV


 FOR...  UD  PRN


 IV  12/20/17 17:30


 1/19/18 17:29  12/21/17 06:43


25 ML


 


 Glucagon


  (Glucagon Inj)  1 mg  UD  PRN


 SQ  12/20/17 17:30


 1/19/18 17:29   


 


 


 Allopurinol


  (Zyloprim Tab)  100 mg  DAILY


 PO  12/21/17 09:00


 1/20/18 08:59  12/23/17 07:50


100 MG


 


 Ascorbic Acid


  (Vitamin C Tab)  500 mg  TIDM


 PO  12/21/17 07:30


 1/20/18 07:29  12/23/17 11:47


500 MG


 


 Carvedilol


  (Coreg Tab)  3.125 mg  BID


 PO  12/20/17 21:00


 1/19/18 20:59  12/22/17 21:01


3.125 MG


 


 Clopidogrel


 Bisulfate


  (plAVix TAB)  75 mg  DAILY


 PO  12/21/17 09:00


 1/20/18 08:59 Future hold 12/22/17 12:52


75 MG


 


 Pantoprazole


 Sodium


  (Protonix Tab)  40 mg  DAILY


 PO  12/21/17 09:00


 1/20/18 08:59  12/23/17 07:51


40 MG


 


 Pravastatin Sodium


  (Pravachol Tab)  20 mg  DAILY


 PO  12/21/17 09:00


 1/20/18 08:59  12/23/17 07:50


20 MG


 


 Tacrolimus


  (Prograf Cap)  4 mg  BID


 PO  12/20/17 21:00


 1/19/18 20:59  12/23/17 07:50


4 MG


 


 Amitriptyline HCl


  (Elavil Tab)  150 mg  HS


 PO  12/20/17 21:00


 1/19/18 20:59  12/22/17 21:01


150 MG


 


 Cholecalciferol


  (Vitamin D Tab)  2,000


 inter.unit  DAILY


 PO  12/21/17 09:00


 1/20/18 08:59  12/23/17 07:51


2,000 INTER.UNIT


 


 Miscellaneous


 Information


  (Order Awaiting


 Action)  1 ea  QS


 N/A  12/21/17 00:00


 1/20/18 00:00   


 


 


 Miscellaneous


 Information


  (Consult


 Glycemic


 Management


 Pharmacy)  1 ea  UD  PRN


 N/A  12/20/17 19:11


 1/19/18 19:10   


 


 


 Calcium Acetate


  (Phoslo Cap)  1,334 mg  TIDM


 PO  12/21/17 07:30


 1/20/18 07:29  12/23/17 11:47


1,334 MG


 


 Insulin Aspart


  (novoLOG ASPART)  SLIDING


 SCALE  ACHS


 SC  12/22/17 03:00


 1/21/18 02:59  12/23/17 11:49


10 UNITS


 


 Insulin Glargine


  (Lantus Solostar


 Pen)  10 units  BID


 SC  12/22/17 09:00


 1/21/18 08:59  12/23/17 07:56


10 UNITS


 


 Tramadol HCl


  (Ultram Tab)  50 mg  Q6H  PRN


 PO  12/23/17 00:30


 1/22/18 00:29  12/23/17 00:45


50 MG











Last 24 Hours








Test


  12/22/17


16:32 12/22/17


20:27 12/23/17


05:44 12/23/17


06:42


 


Bedside Glucose 343 mg/dl  189 mg/dl   176 mg/dl 


 


Hemoglobin   9.2 g/dL  


 


Hematocrit   27.1 %  


 


Sodium Level   137 mmol/L  


 


Potassium Level   4.0 mmol/L  


 


Chloride Level   106 mmol/L  


 


Carbon Dioxide Level   23 mmol/L  


 


Anion Gap   8.0 mmol/L  


 


Blood Urea Nitrogen   30 mg/dl  


 


Creatinine   4.02 mg/dl  


 


Est Creatinine Clear Calc


Drug Dose 


  


  17.0 ml/min 


  


 


 


Estimated GFR (


American) 


  


  17.2 


  


 


 


Estimated GFR (Non-


American 


  


  14.8 


  


 


 


BUN/Creatinine Ratio   7.6  


 


Random Glucose   176 mg/dl  


 


Calcium Level   7.7 mg/dl  


 


Phosphorus Level   3.3 mg/dl  


 


Magnesium Level   1.8 mg/dl  


 


Test


  12/23/17


11:31 


  


  


 


 


Bedside Glucose 231 mg/dl    











Assessment & Plan


61 yo male who had tdc 12/21 along w/ first HD tx same date has improved 

clinical status; awaiting final acceptance at  Dialysis unit; acceptance 

already in process; Dr Arzola will continue as outpt nephrologist 








ESRD-doing 4k bath, 3hours,  no fluid removal. 





anemia of renal failure-giving venofer and procrit for goal hg of 10 to 11





CHAD- cont current phoslo. calcium levels are trending back up. has needed 

supplemental calcium gluconate this admission but none needed today





Dialysis unit-needs to be finalized as accepted at the [facility] dialysis unit-

[phone], preferably m-w-f.  aware/application in process





Appreciate consult; will follow with you.  Care coordinated w/ Dr. Sexton.

## 2017-12-23 NOTE — PROGRESS NOTE
Internal Med Progress Note


Date of Service:


Dec 23, 2017.


Provider Documentation:





SUBJECTIVE:


Seen and examined at bedside


Planned for dialysis today


got IV Venofer yesterday


Feels well


Denies chest pain, SOB


Mild soreness at site of catheter improving 


No other complaints











OBJECTIVE:





Vital Signs-as noted below





Physical Exam:





General Appearance:Moderately built and nourished, no apparent distress


Head:  normocephalic, Atraumatic 


Eyes:  normal inspection, EOMI, PERRL


Neck:  supple, Trachea midline


Respiratory/Chest: Normal breath sounds, CTA


Cardiovascular: S1, S2, No murmur


Abdomen/GI:Soft, Non tender, Bowel sounds present


Extremities/Musculoskelatal:normal inspection, no edema 


Neurologic/Psych:AAOX3, grossly no focal neurological deficits 


Skin:  normal color, warm








Lab data as noted below.


ASSESSMENT & PLAN:





Patient is a 62yr male with PMH of ESRD, poorly controlled DM II, cerebral palsy

, CAD (s/p stents in 2011) and s/p liver transplant (1998) who was admitted 

directly from 's nephrology clinic for initiation of dialysis. 








End stage Renal Disease:


Continue Dialysis per Nephrology 


Cr levels Improved


Got Tunneled Catheter placement on 12/21/17


Appreciate Nephrology help 


Monitor renal function


Plan to be discharged once outpatient dialysis is set up 








Uncontrolled DM II:


Last A1C:9.6 in Dec, 2017


A1C:8.9


Blood glucose levels in 400s on presentation


Blood sugar levels improved


DC Insulin drip


ISS, lantus


Pharmacy Glycemic control consulted


Pharmacy to manage Insulin








Anemia of Chronic Disease:


No acute bleeding issues


Monitor Hb


Hb stable


Procrit and Venofer per Nephrology








CAD S/P stents  


H/o MIs in 2009 and 2011


No acute issues  


Continue coreg, statin, Plavix








S/p liver transplant: 


H/o Hepatitic B 


Continue Prograf, Entecavir 








DVT Px:


SCDs 





Code status:


 FULL 





PCP: Stephan 





Disposition:


Plan to discharge home when Outpatient dialysis is set up.


Vital Signs:











  Date Time  Temp Pulse Resp B/P (MAP) Pulse Ox O2 Delivery O2 Flow Rate FiO2


 


12/23/17 07:27 36.7 78 18 111/65 (80) 98 Room Air  


 


12/23/17 04:00      Room Air  


 


12/23/17 03:58 36.6 71 16 96/60 (72) 95 Room Air  


 


12/23/17 00:00      Room Air  


 


12/22/17 23:56 36.9 80 18 100/64 (76) 95 Room Air  


 


12/22/17 20:00      Room Air  


 


12/22/17 19:06 37.6 78 16 114/86 (95) 96 Room Air  


 


12/22/17 16:08 36.9 68 18 139/84 (102) 97 Room Air  


 


12/22/17 16:00      Room Air  


 


12/22/17 12:30 37.0 86 20 128/75 (92) 96 Room Air  


 


12/22/17 12:00      Room Air  


 


12/22/17 11:33 36.5 67  152/95 (114)    


 


12/22/17 11:15  78  107/83    


 


12/22/17 11:00  77  124/67    


 


12/22/17 10:45  72  127/88    


 


12/22/17 10:30  67  125/76    


 


12/22/17 10:15  68  139/85    


 


12/22/17 10:00  67  134/85    








Lab Results:





Results Past 24 Hours








Test


  12/22/17


10:22 12/22/17


16:32 12/22/17


20:27 12/23/17


05:44 Range/Units


 


 


Hepatitis B Surface Antibody NEG     


 


Bedside Glucose  343 189  70-99  mg/dl


 


Hemoglobin    9.2 14.0-18.0  g/dL


 


Hematocrit    27.1 42-52  %


 


Sodium Level    137 136-145  mmol/L


 


Potassium Level    4.0 3.5-5.1  mmol/L


 


Chloride Level    106   mmol/L


 


Carbon Dioxide Level    23 21-32  mmol/L


 


Anion Gap    8.0 3-11  mmol/L


 


Blood Urea Nitrogen    30 7-18  mg/dl


 


Creatinine


  


  


  


  4.02


  0.60-1.40


mg/dl


 


Est Creatinine Clear Calc


Drug Dose 


  


  


  17.0


   ml/min


 


 


Estimated GFR (


American) 


  


  


  17.2


   


 


 


Estimated GFR (Non-


American 


  


  


  14.8


   


 


 


BUN/Creatinine Ratio    7.6 10-20  


 


Random Glucose    176 70-99  mg/dl


 


Calcium Level    7.7 8.5-10.1  mg/dl


 


Phosphorus Level    3.3 2.5-4.9  mg/dl


 


Magnesium Level    1.8 1.8-2.4  mg/dl


 


Test


  12/23/17


06:42 


  


  


  Range/Units


 


 


Bedside Glucose 176    70-99  mg/dl

## 2017-12-23 NOTE — PHARMACY PROGRESS NOTE
Glycemic Control Progress Note


Date of Service


Dec 23, 2017.





Scope


Glycemic Pharmacist consulted for glycemic control to write orders per [facility] 

inpatient glycemic control protocol.





Objective


Accuchecks BSG (last 24hrs):











Test


  12/22/17


16:32 12/22/17


20:27 12/23/17


05:44 12/23/17


06:42


 


Bedside Glucose


  343 mg/dl


(70-99) 189 mg/dl


(70-99) 


  176 mg/dl


(70-99)


 


Random Glucose


  


  


  176 mg/dl


(70-99) 


 








HbA1c:











Test


  12/21/17


05:36


 


Hemoglobin A1c


  8.9 %


(4.5-5.6)  H











Recent Pertinent Medications








The patient is currently receiving:


* Basal insulin:             Lantus 10 units every 12 hours





* Correctional Insulin:   Novolog Correction per scale ACHS


                          Goal Range: Low 140 mg/dL - High 180 mg/dL


                          Correction Factor: 20 mg/dL/unit





* Prandial insulin:         Per carb ratio of 1 unit per 10 grams CHO consumed








* Oral Agents:             None currently





Outpatient Anti-Diabetic Meds


* Lantus 30 units SQ qPM


* Novolog TID with meals 


 * 8 units with breakfast


 * 10 units with lunch


 * 12 units with supper





Assessment & Plan


ASSESSMENT:








12/23/17


* Glycemic control was less than ideal yesterday, however patient's insulin was 

not administered as prescribed


* BSGs ranged 176-343 over the last 24 hours


* Fasting  this AM with only 10 units of Lantus on board, he was not 

given the AM 10 unit dose of Lantus yesterday AM because he was at HD


* Pre-dinner BSG climbed to 373 yesterday likely due to missing his AM Lantus + 

not being given Novolog with his Lunch yesterday


* Would like to trial the current insulin orders another day as they really 

have not been given according to schedule.


* He is ordered HD again today, which typically leads to improved insulin 

sensitivity.








PLAN FOR INPATIENT GLYCEMIC CONTROL:


* Continuing Lantus 10 units SQ BID


* Continuing correction factor of 20 mg/dl/unit


* Continuing carb ratio of 1 unit per 10 grams CHO consumed


* Changing goal range Low 120 mg/dL - High 160 mg/dL 

















* Please note that the plan above was derived based on current level of insulin 

resistance and hospital stress. These recommendations are appropriate for 

inpatient admission only. Plan of care upon discharge will need to be 

reassessed to avoid potential outpatient hypo/hyperglycemia. 








Thank you.

## 2017-12-24 VITALS
DIASTOLIC BLOOD PRESSURE: 74 MMHG | TEMPERATURE: 98.24 F | HEART RATE: 66 BPM | OXYGEN SATURATION: 97 % | SYSTOLIC BLOOD PRESSURE: 117 MMHG

## 2017-12-24 VITALS — OXYGEN SATURATION: 95 %

## 2017-12-24 VITALS
TEMPERATURE: 98.06 F | SYSTOLIC BLOOD PRESSURE: 117 MMHG | OXYGEN SATURATION: 95 % | DIASTOLIC BLOOD PRESSURE: 76 MMHG | HEART RATE: 64 BPM

## 2017-12-24 VITALS
OXYGEN SATURATION: 95 % | DIASTOLIC BLOOD PRESSURE: 71 MMHG | SYSTOLIC BLOOD PRESSURE: 106 MMHG | HEART RATE: 83 BPM | TEMPERATURE: 98.24 F

## 2017-12-24 LAB
ANION GAP SERPL CALC-SCNC: 5 MMOL/L (ref 3–11)
BUN SERPL-MCNC: 20 MG/DL (ref 7–18)
BUN/CREAT SERPL: 6.7 (ref 10–20)
CALCIUM SERPL-MCNC: 8.1 MG/DL (ref 8.5–10.1)
CHLORIDE SERPL-SCNC: 105 MMOL/L (ref 98–107)
CO2 SERPL-SCNC: 27 MMOL/L (ref 21–32)
CREAT CL PREDICTED SERPL C-G-VRATE: 21.2 ML/MIN
CREAT SERPL-MCNC: 3.05 MG/DL (ref 0.6–1.4)
GLUCOSE SERPL-MCNC: 149 MG/DL (ref 70–99)
MAGNESIUM SERPL-MCNC: 1.8 MG/DL (ref 1.8–2.4)
PHOSPHATE SERPL-MCNC: 2.6 MG/DL (ref 2.5–4.9)
POTASSIUM SERPL-SCNC: 4.1 MMOL/L (ref 3.5–5.1)
SODIUM SERPL-SCNC: 137 MMOL/L (ref 136–145)

## 2017-12-24 RX ADMIN — CALCIUM ACETATE SCH MG: 667 CAPSULE ORAL at 12:34

## 2017-12-24 RX ADMIN — CALCIUM ACETATE SCH MG: 667 CAPSULE ORAL at 08:44

## 2017-12-24 RX ADMIN — CARVEDILOL SCH MG: 3.12 TABLET, FILM COATED ORAL at 20:58

## 2017-12-24 RX ADMIN — OXYCODONE HYDROCHLORIDE AND ACETAMINOPHEN SCH MG: 500 TABLET ORAL at 08:48

## 2017-12-24 RX ADMIN — INSULIN ASPART SCH UNITS: 100 INJECTION, SOLUTION INTRAVENOUS; SUBCUTANEOUS at 21:05

## 2017-12-24 RX ADMIN — INSULIN GLARGINE SCH UNITS: 100 INJECTION, SOLUTION SUBCUTANEOUS at 08:55

## 2017-12-24 RX ADMIN — ALLOPURINOL SCH MG: 100 TABLET ORAL at 08:49

## 2017-12-24 RX ADMIN — TACROLIMUS SCH MG: 1 CAPSULE ORAL at 08:47

## 2017-12-24 RX ADMIN — AMITRIPTYLINE HYDROCHLORIDE SCH MG: 50 TABLET, FILM COATED ORAL at 21:00

## 2017-12-24 RX ADMIN — INSULIN GLARGINE SCH UNITS: 100 INJECTION, SOLUTION SUBCUTANEOUS at 21:06

## 2017-12-24 RX ADMIN — TACROLIMUS SCH MG: 1 CAPSULE ORAL at 20:59

## 2017-12-24 RX ADMIN — PANTOPRAZOLE SCH MG: 40 TABLET, DELAYED RELEASE ORAL at 08:47

## 2017-12-24 RX ADMIN — CALCIUM ACETATE SCH MG: 667 CAPSULE ORAL at 17:39

## 2017-12-24 RX ADMIN — CLOPIDOGREL BISULFATE SCH MG: 75 TABLET, FILM COATED ORAL at 08:51

## 2017-12-24 RX ADMIN — OXYCODONE HYDROCHLORIDE AND ACETAMINOPHEN SCH MG: 500 TABLET ORAL at 12:35

## 2017-12-24 RX ADMIN — CARVEDILOL SCH MG: 3.12 TABLET, FILM COATED ORAL at 08:43

## 2017-12-24 RX ADMIN — INSULIN ASPART SCH UNITS: 100 INJECTION, SOLUTION INTRAVENOUS; SUBCUTANEOUS at 12:41

## 2017-12-24 RX ADMIN — INSULIN ASPART SCH UNITS: 100 INJECTION, SOLUTION INTRAVENOUS; SUBCUTANEOUS at 17:41

## 2017-12-24 RX ADMIN — OXYCODONE HYDROCHLORIDE AND ACETAMINOPHEN SCH MG: 500 TABLET ORAL at 17:40

## 2017-12-24 RX ADMIN — INSULIN ASPART SCH UNITS: 100 INJECTION, SOLUTION INTRAVENOUS; SUBCUTANEOUS at 08:54

## 2017-12-24 RX ADMIN — PRAVASTATIN SODIUM SCH MG: 20 TABLET ORAL at 08:46

## 2017-12-24 RX ADMIN — Medication SCH INTER.UNIT: at 08:48

## 2017-12-24 NOTE — PHARMACY PROGRESS NOTE
Glycemic Control Progress Note


Date of Service


Dec 24, 2017.





Scope


Glycemic Pharmacist consulted for glycemic control to write orders per [facility] 

inpatient glycemic control protocol.





Objective


Accuchecks BSG (last 24hrs):











Test


  12/23/17


11:31 12/23/17


16:45 12/23/17


21:01 12/24/17


06:00


 


Bedside Glucose


  231 mg/dl


(70-99) 125 mg/dl


(70-99) 140 mg/dl


(70-99) 


 


 


Random Glucose


  


  


  


  149 mg/dl


(70-99)


 


Test


  12/24/17


07:53 


  


  


 


 


Bedside Glucose


  140 mg/dl


(70-99) 


  


  


 








HbA1c:











Test


  12/21/17


05:36


 


Hemoglobin A1c


  8.9 %


(4.5-5.6)  H











Recent Pertinent Medications








The patient is currently receiving:


* Basal insulin:             Lantus 10 units every 12 hours





* Correctional Insulin:   Novolog Correction per scale ACHS


                          Goal Range: Low 120 mg/dL - High 160 mg/dL


                          Correction Factor: 20 mg/dL/unit





* Prandial insulin:         Per carb ratio of 1 unit per 10 grams CHO consumed








* Oral Agents:             None currently





Outpatient Anti-Diabetic Meds


* Lantus 30 units SQ qPM


* Novolog TID with meals 


 * 8 units with breakfast


 * 10 units with lunch


 * 12 units with supper





Assessment & Plan


ASSESSMENT:








12/23/17


* Glycemic control was less than ideal yesterday, however patient's insulin was 

not administered as prescribed


* BSGs ranged 176-343 over the last 24 hours


* Fasting  this AM with only 10 units of Lantus on board, he was not 

given the AM 10 unit dose of Lantus yesterday AM because he was at HD


* Pre-dinner BSG climbed to 373 yesterday likely due to missing his AM Lantus + 

not being given Novolog with his Lunch yesterday


* Would like to trial the current insulin orders another day as they really 

have not been given according to schedule.


* He is ordered HD again today, which typically leads to improved insulin 

sensitivity.





12/24/17


* Glycemic control was much improved yesterday


* All insulin doses had been administered per schedule


* BSGs have ranged 125-231 over the last 24 hrs, only 1 BSG above 180


* Fasting BSG at goal this AM, , w/ 20 units of Lantus on board -will 

continue the same


* 2 of 3 post-prandial BSGs controlled using current CR.  Pre-lunch BSG was 

elevated yesterday, if this trend continues he may require a larger prandial 

insulin dose w/ breakfast.


* No HD is ordered for today as of yet.








PLAN FOR INPATIENT GLYCEMIC CONTROL:


* Continuing Lantus 10 units SQ BID


* Continuing correction factor of 20 mg/dl/unit


* Continuing carb ratio of 1 unit per 10 grams CHO consumed


* Change goal range to Low 110 mg/dL - High 140 mg/dL 

















* Please note that the plan above was derived based on current level of insulin 

resistance and hospital stress. These recommendations are appropriate for 

inpatient admission only. Plan of care upon discharge will need to be 

reassessed to avoid potential outpatient hypo/hyperglycemia. 








Thank you.

## 2017-12-24 NOTE — PROGRESS NOTE
Internal Med Progress Note


Date of Service:


Dec 24, 2017.


Provider Documentation:





SUBJECTIVE:


Seen and examined at bedside


Feels tired 


Got dialysis yesterday 


Denies chest pain, SOB


Mild soreness at site of catheter improving 


No other complaints


Awaiting for Outpatient HD setup








OBJECTIVE:





Vital Signs-as noted below





Physical Exam:





General Appearance:Moderately built and nourished, no apparent distress


Head:  normocephalic, Atraumatic 


Eyes:  normal inspection, EOMI, PERRL


Neck:  supple, Trachea midline


Respiratory/Chest: Normal breath sounds, CTA


Cardiovascular: S1, S2, No murmur


Abdomen/GI:Soft, Non tender, Bowel sounds present


Extremities/Musculoskelatal:normal inspection, no edema 


Neurologic/Psych:AAOX3, grossly no focal neurological deficits 


Skin:  normal color, warm








Lab data as noted below.


ASSESSMENT & PLAN:





Patient is a 62yr male with PMH of ESRD, poorly controlled DM II, cerebral palsy

, CAD (s/p stents in 2011) and s/p liver transplant (1998) who was admitted 

directly from 's nephrology clinic for initiation of dialysis. 








End stage Renal Disease:


Continue Dialysis per Nephrology 


Cr levels Improved


Got Tunneled Catheter placement on 12/21/17


Appreciate Nephrology help 


Awaiting for outpatient dialysis set up 


Monitor renal function, electrolytes








Uncontrolled DM II:


Last A1C:9.6 in Dec, 2017


A1C:8.9


Blood glucose levels in 400s on presentation


Blood sugar levels improved


DC Insulin drip


ISS, lantus


Pharmacy Glycemic control consulted


Pharmacy to manage Insulin








Anemia of Chronic Disease:


No acute bleeding issues


Monitor Hb


Hb stable


Procrit and Venofer per Nephrology








CAD S/P stents  


H/o MIs in 2009 and 2011


No acute issues  


Continue coreg, statin, Plavix








S/p liver transplant: 


H/o Hepatitic B 


Continue Prograf, Entecavir 








DVT Px:


SCDs 





Code status:


 FULL 





PCP: Stephan 





Disposition:


Plan to discharge home when Outpatient dialysis is set up.


Vital Signs:











  Date Time  Temp Pulse Resp B/P (MAP) Pulse Ox O2 Delivery O2 Flow Rate FiO2


 


12/24/17 08:15     95 Room Air  


 


12/24/17 07:45 36.7 64 18 117/76 (90) 95 Room Air  


 


12/24/17 01:40 36.8 83 18 106/71 (83) 95 Room Air  


 


12/24/17 00:00      Room Air  


 


12/23/17 20:00      Room Air  


 


12/23/17 18:02 36.7 73  146/86 (106)    


 


12/23/17 18:00     99 Room Air  


 


12/23/17 17:15  76  126/82    


 


12/23/17 17:00  50  118/74    


 


12/23/17 16:45  68  125/83    


 


12/23/17 16:30  72  117/72    


 


12/23/17 16:15  71  124/79    


 


12/23/17 16:00  74  115/76    


 


12/23/17 15:45  69  119/77    


 


12/23/17 15:30  66  112/76    


 


12/23/17 15:15  67  125/78    


 


12/23/17 15:00  69  129/84    


 


12/23/17 14:45  67  125/83    


 


12/23/17 14:30  72  134/82    


 


12/23/17 14:18 36.7 71  126/77 (93)    








Lab Results:





Results Past 24 Hours








Test


  12/23/17


16:45 12/23/17


21:01 12/24/17


06:00 12/24/17


07:53 Range/Units


 


 


Bedside Glucose 125 140  140 70-99  mg/dl


 


Sodium Level   137  136-145  mmol/L


 


Potassium Level   4.1  3.5-5.1  mmol/L


 


Chloride Level   105    mmol/L


 


Carbon Dioxide Level   27  21-32  mmol/L


 


Anion Gap   5.0  3-11  mmol/L


 


Blood Urea Nitrogen   20  7-18  mg/dl


 


Creatinine


  


  


  3.05


  


  0.60-1.40


mg/dl


 


Est Creatinine Clear Calc


Drug Dose 


  


  21.2


  


   ml/min


 


 


Estimated GFR (


American) 


  


  24.0


  


   


 


 


Estimated GFR (Non-


American 


  


  20.7


  


   


 


 


BUN/Creatinine Ratio   6.7  10-20  


 


Random Glucose   149  70-99  mg/dl


 


Calcium Level   8.1  8.5-10.1  mg/dl


 


Phosphorus Level   2.6  2.5-4.9  mg/dl


 


Magnesium Level   1.8  1.8-2.4  mg/dl


 


Test


  12/24/17


11:49 


  


  


  Range/Units


 


 


Bedside Glucose 247    70-99  mg/dl Pt. Off unit at 1205 pm and  Has not yet returned from [facility].

## 2017-12-25 VITALS
SYSTOLIC BLOOD PRESSURE: 113 MMHG | OXYGEN SATURATION: 98 % | TEMPERATURE: 98.06 F | DIASTOLIC BLOOD PRESSURE: 65 MMHG | HEART RATE: 68 BPM

## 2017-12-25 VITALS
HEART RATE: 59 BPM | OXYGEN SATURATION: 100 % | DIASTOLIC BLOOD PRESSURE: 84 MMHG | TEMPERATURE: 97.88 F | SYSTOLIC BLOOD PRESSURE: 126 MMHG

## 2017-12-25 VITALS — OXYGEN SATURATION: 98 %

## 2017-12-25 VITALS
DIASTOLIC BLOOD PRESSURE: 75 MMHG | SYSTOLIC BLOOD PRESSURE: 125 MMHG | TEMPERATURE: 98.06 F | OXYGEN SATURATION: 95 % | HEART RATE: 70 BPM

## 2017-12-25 VITALS
OXYGEN SATURATION: 98 % | DIASTOLIC BLOOD PRESSURE: 88 MMHG | SYSTOLIC BLOOD PRESSURE: 143 MMHG | HEART RATE: 61 BPM | TEMPERATURE: 97.7 F

## 2017-12-25 VITALS
SYSTOLIC BLOOD PRESSURE: 102 MMHG | TEMPERATURE: 97.88 F | HEART RATE: 90 BPM | OXYGEN SATURATION: 99 % | DIASTOLIC BLOOD PRESSURE: 67 MMHG

## 2017-12-25 VITALS — HEART RATE: 80 BPM | SYSTOLIC BLOOD PRESSURE: 109 MMHG | DIASTOLIC BLOOD PRESSURE: 72 MMHG

## 2017-12-25 VITALS — SYSTOLIC BLOOD PRESSURE: 144 MMHG | HEART RATE: 60 BPM | DIASTOLIC BLOOD PRESSURE: 87 MMHG

## 2017-12-25 VITALS — OXYGEN SATURATION: 100 %

## 2017-12-25 LAB
ANION GAP SERPL CALC-SCNC: 7 MMOL/L (ref 3–11)
BUN SERPL-MCNC: 34 MG/DL (ref 7–18)
BUN/CREAT SERPL: 6.8 (ref 10–20)
CALCIUM SERPL-MCNC: 8.4 MG/DL (ref 8.5–10.1)
CHLORIDE SERPL-SCNC: 106 MMOL/L (ref 98–107)
CO2 SERPL-SCNC: 25 MMOL/L (ref 21–32)
CREAT CL PREDICTED SERPL C-G-VRATE: 13 ML/MIN
CREAT SERPL-MCNC: 4.98 MG/DL (ref 0.6–1.4)
GLUCOSE SERPL-MCNC: 123 MG/DL (ref 70–99)
MAGNESIUM SERPL-MCNC: 2 MG/DL (ref 1.8–2.4)
POTASSIUM SERPL-SCNC: 4 MMOL/L (ref 3.5–5.1)
SODIUM SERPL-SCNC: 138 MMOL/L (ref 136–145)

## 2017-12-25 RX ADMIN — CARVEDILOL SCH MG: 3.12 TABLET, FILM COATED ORAL at 19:42

## 2017-12-25 RX ADMIN — CALCIUM ACETATE SCH MG: 667 CAPSULE ORAL at 08:09

## 2017-12-25 RX ADMIN — INSULIN ASPART SCH UNITS: 100 INJECTION, SOLUTION INTRAVENOUS; SUBCUTANEOUS at 20:52

## 2017-12-25 RX ADMIN — INSULIN ASPART SCH UNITS: 100 INJECTION, SOLUTION INTRAVENOUS; SUBCUTANEOUS at 17:32

## 2017-12-25 RX ADMIN — TACROLIMUS SCH MG: 1 CAPSULE ORAL at 19:43

## 2017-12-25 RX ADMIN — CALCIUM ACETATE SCH MG: 667 CAPSULE ORAL at 12:43

## 2017-12-25 RX ADMIN — INSULIN GLARGINE SCH UNITS: 100 INJECTION, SOLUTION SUBCUTANEOUS at 09:13

## 2017-12-25 RX ADMIN — INSULIN ASPART SCH UNITS: 100 INJECTION, SOLUTION INTRAVENOUS; SUBCUTANEOUS at 09:12

## 2017-12-25 RX ADMIN — OXYCODONE HYDROCHLORIDE AND ACETAMINOPHEN SCH MG: 500 TABLET ORAL at 08:11

## 2017-12-25 RX ADMIN — ALLOPURINOL SCH MG: 100 TABLET ORAL at 08:12

## 2017-12-25 RX ADMIN — TACROLIMUS SCH MG: 1 CAPSULE ORAL at 08:10

## 2017-12-25 RX ADMIN — OXYCODONE HYDROCHLORIDE AND ACETAMINOPHEN SCH MG: 500 TABLET ORAL at 16:59

## 2017-12-25 RX ADMIN — OXYCODONE HYDROCHLORIDE AND ACETAMINOPHEN SCH MG: 500 TABLET ORAL at 12:43

## 2017-12-25 RX ADMIN — CARVEDILOL SCH MG: 3.12 TABLET, FILM COATED ORAL at 08:08

## 2017-12-25 RX ADMIN — PRAVASTATIN SODIUM SCH MG: 20 TABLET ORAL at 08:10

## 2017-12-25 RX ADMIN — INSULIN ASPART SCH UNITS: 100 INJECTION, SOLUTION INTRAVENOUS; SUBCUTANEOUS at 12:41

## 2017-12-25 RX ADMIN — CLOPIDOGREL BISULFATE SCH MG: 75 TABLET, FILM COATED ORAL at 08:09

## 2017-12-25 RX ADMIN — Medication SCH INTER.UNIT: at 08:12

## 2017-12-25 RX ADMIN — AMITRIPTYLINE HYDROCHLORIDE SCH MG: 50 TABLET, FILM COATED ORAL at 20:47

## 2017-12-25 RX ADMIN — PANTOPRAZOLE SCH MG: 40 TABLET, DELAYED RELEASE ORAL at 08:11

## 2017-12-25 RX ADMIN — INSULIN GLARGINE SCH UNITS: 100 INJECTION, SOLUTION SUBCUTANEOUS at 20:51

## 2017-12-25 RX ADMIN — CALCIUM ACETATE SCH MG: 667 CAPSULE ORAL at 16:59

## 2017-12-25 NOTE — PROGRESS NOTE
Internal Med Progress Note


Date of Service:


Dec 25, 2017.


Provider Documentation:





SUBJECTIVE:





Seen and examined at bedside


Feels well 


Denies chest pain, SOB


No other complaints


Awaiting for Outpatient dialysis setup








OBJECTIVE:





Vital Signs-as noted below





Physical Exam:





General Appearance:Moderately built and nourished, no apparent distress


Head:  normocephalic, Atraumatic 


Eyes:  normal inspection, EOMI, PERRL


Neck:  supple, Trachea midline


Respiratory/Chest: Normal breath sounds, CTA


Cardiovascular: S1, S2, No murmur


Abdomen/GI:Soft, Non tender, Bowel sounds present


Extremities/Musculoskelatal:normal inspection, no edema 


Neurologic/Psych:AAOX3, grossly no focal neurological deficits 


Skin:  normal color, warm








Lab data as noted below.


ASSESSMENT & PLAN:





Patient is a 62yr male with PMH of ESRD, poorly controlled DM II, cerebral palsy

, CAD (s/p stents in 2011) and s/p liver transplant (1998) who was admitted 

directly from 's nephrology clinic for initiation of dialysis. 








End stage Renal Disease:


Continue Dialysis per Nephrology 


Got Tunneled Catheter placement on 12/21/17


Appreciate Nephrology help 


Awaiting for outpatient dialysis set up 


Monitor renal function, electrolytes


continue current management








Uncontrolled DM II:


Last A1C:9.6 in Dec, 2017


A1C:8.9


Blood glucose levels in 400s on presentation


Blood sugar levels improved


DC Insulin drip


ISS, lantus


Pharmacy Glycemic control consulted


Pharmacy to manage Insulin








Anemia of Chronic Disease:


No acute bleeding issues


Monitor Hb


Hb stable


Procrit and Venofer per Nephrology








CAD S/P stents  


H/o MIs in 2009 and 2011


No acute issues  


Continue coreg, statin, Plavix








S/p liver transplant: 


H/o Hepatitic B 


Continue Prograf, Entecavir 








DVT Px:


SCDs 





Code status:


 FULL 





PCP: Stephan 





Disposition:


Plan to discharge home when Outpatient dialysis is set up, likely tomorrow.


Vital Signs:











  Date Time  Temp Pulse Resp B/P (MAP) Pulse Ox O2 Delivery O2 Flow Rate FiO2


 


12/25/17 09:51     100 Room Air  


 


12/25/17 08:04  60  144/87 (106)    


 


12/25/17 07:24 36.6 59 16 126/84 (98) 100 Room Air  


 


12/25/17 00:46 36.7 70 18 125/75 (92) 95 Room Air  


 


12/25/17 00:00      Room Air  


 


12/24/17 20:00      Room Air  


 


12/24/17 18:07      Room Air  


 


12/24/17 16:14 36.8 66 18 117/74 (88) 97 Room Air  








Lab Results:





Results Past 24 Hours








Test


  12/24/17


16:42 12/24/17


20:19 12/25/17


05:32 12/25/17


08:00 Range/Units


 


 


Bedside Glucose 170 211  157 70-99  mg/dl


 


Sodium Level   138  136-145  mmol/L


 


Potassium Level   4.0  3.5-5.1  mmol/L


 


Chloride Level   106    mmol/L


 


Carbon Dioxide Level   25  21-32  mmol/L


 


Anion Gap   7.0  3-11  mmol/L


 


Blood Urea Nitrogen   34  7-18  mg/dl


 


Creatinine


  


  


  4.98


  


  0.60-1.40


mg/dl


 


Est Creatinine Clear Calc


Drug Dose 


  


  13.0


  


   ml/min


 


 


Estimated GFR (


American) 


  


  13.3


  


   


 


 


Estimated GFR (Non-


American 


  


  11.4


  


   


 


 


BUN/Creatinine Ratio   6.8  10-20  


 


Random Glucose   123  70-99  mg/dl


 


Calcium Level   8.4  8.5-10.1  mg/dl


 


Magnesium Level   2.0  1.8-2.4  mg/dl


 


Test


  12/25/17


11:13 


  


  


  Range/Units


 


 


Bedside Glucose 195    70-99  mg/dl

## 2017-12-26 VITALS — HEART RATE: 67 BPM | SYSTOLIC BLOOD PRESSURE: 106 MMHG | DIASTOLIC BLOOD PRESSURE: 74 MMHG

## 2017-12-26 VITALS — DIASTOLIC BLOOD PRESSURE: 72 MMHG | SYSTOLIC BLOOD PRESSURE: 98 MMHG | HEART RATE: 72 BPM

## 2017-12-26 VITALS — SYSTOLIC BLOOD PRESSURE: 125 MMHG | HEART RATE: 68 BPM | DIASTOLIC BLOOD PRESSURE: 87 MMHG

## 2017-12-26 VITALS
TEMPERATURE: 97.88 F | HEART RATE: 47 BPM | OXYGEN SATURATION: 100 % | DIASTOLIC BLOOD PRESSURE: 67 MMHG | SYSTOLIC BLOOD PRESSURE: 97 MMHG

## 2017-12-26 VITALS
DIASTOLIC BLOOD PRESSURE: 83 MMHG | SYSTOLIC BLOOD PRESSURE: 115 MMHG | OXYGEN SATURATION: 100 % | HEART RATE: 67 BPM | TEMPERATURE: 98.24 F

## 2017-12-26 VITALS — HEART RATE: 71 BPM | SYSTOLIC BLOOD PRESSURE: 120 MMHG | DIASTOLIC BLOOD PRESSURE: 82 MMHG

## 2017-12-26 VITALS — DIASTOLIC BLOOD PRESSURE: 82 MMHG | SYSTOLIC BLOOD PRESSURE: 114 MMHG | HEART RATE: 61 BPM

## 2017-12-26 VITALS — HEART RATE: 64 BPM | SYSTOLIC BLOOD PRESSURE: 139 MMHG | DIASTOLIC BLOOD PRESSURE: 95 MMHG | TEMPERATURE: 97.88 F

## 2017-12-26 VITALS — SYSTOLIC BLOOD PRESSURE: 97 MMHG | DIASTOLIC BLOOD PRESSURE: 67 MMHG | HEART RATE: 47 BPM

## 2017-12-26 VITALS — DIASTOLIC BLOOD PRESSURE: 86 MMHG | SYSTOLIC BLOOD PRESSURE: 130 MMHG | HEART RATE: 65 BPM

## 2017-12-26 VITALS — HEART RATE: 71 BPM | SYSTOLIC BLOOD PRESSURE: 105 MMHG | DIASTOLIC BLOOD PRESSURE: 75 MMHG

## 2017-12-26 VITALS — HEART RATE: 71 BPM | SYSTOLIC BLOOD PRESSURE: 94 MMHG | DIASTOLIC BLOOD PRESSURE: 71 MMHG

## 2017-12-26 VITALS — DIASTOLIC BLOOD PRESSURE: 72 MMHG | SYSTOLIC BLOOD PRESSURE: 104 MMHG | HEART RATE: 67 BPM

## 2017-12-26 VITALS — HEART RATE: 65 BPM | SYSTOLIC BLOOD PRESSURE: 130 MMHG | DIASTOLIC BLOOD PRESSURE: 77 MMHG

## 2017-12-26 VITALS — SYSTOLIC BLOOD PRESSURE: 145 MMHG | DIASTOLIC BLOOD PRESSURE: 94 MMHG | TEMPERATURE: 97.88 F | HEART RATE: 65 BPM

## 2017-12-26 VITALS — SYSTOLIC BLOOD PRESSURE: 117 MMHG | HEART RATE: 70 BPM | DIASTOLIC BLOOD PRESSURE: 86 MMHG

## 2017-12-26 VITALS — DIASTOLIC BLOOD PRESSURE: 82 MMHG | HEART RATE: 69 BPM | SYSTOLIC BLOOD PRESSURE: 127 MMHG

## 2017-12-26 VITALS — HEART RATE: 71 BPM | DIASTOLIC BLOOD PRESSURE: 76 MMHG | SYSTOLIC BLOOD PRESSURE: 108 MMHG

## 2017-12-26 LAB
ANION GAP SERPL CALC-SCNC: 9 MMOL/L (ref 3–11)
BUN SERPL-MCNC: 49 MG/DL (ref 7–18)
BUN/CREAT SERPL: 8.6 (ref 10–20)
CALCIUM SERPL-MCNC: 8.5 MG/DL (ref 8.5–10.1)
CHLORIDE SERPL-SCNC: 106 MMOL/L (ref 98–107)
CO2 SERPL-SCNC: 24 MMOL/L (ref 21–32)
CREAT CL PREDICTED SERPL C-G-VRATE: 11.5 ML/MIN
CREAT SERPL-MCNC: 5.64 MG/DL (ref 0.6–1.4)
GLUCOSE SERPL-MCNC: 134 MG/DL (ref 70–99)
HCT VFR BLD CALC: 29.3 % (ref 42–52)
MAGNESIUM SERPL-MCNC: 2 MG/DL (ref 1.8–2.4)
POTASSIUM SERPL-SCNC: 4.2 MMOL/L (ref 3.5–5.1)
SODIUM SERPL-SCNC: 139 MMOL/L (ref 136–145)

## 2017-12-26 RX ADMIN — INSULIN GLARGINE SCH UNITS: 100 INJECTION, SOLUTION SUBCUTANEOUS at 08:32

## 2017-12-26 RX ADMIN — OXYCODONE HYDROCHLORIDE AND ACETAMINOPHEN SCH MG: 500 TABLET ORAL at 13:45

## 2017-12-26 RX ADMIN — INSULIN ASPART SCH UNITS: 100 INJECTION, SOLUTION INTRAVENOUS; SUBCUTANEOUS at 08:30

## 2017-12-26 RX ADMIN — OXYCODONE HYDROCHLORIDE AND ACETAMINOPHEN SCH MG: 500 TABLET ORAL at 08:00

## 2017-12-26 RX ADMIN — Medication SCH INTER.UNIT: at 13:41

## 2017-12-26 RX ADMIN — ALLOPURINOL SCH MG: 100 TABLET ORAL at 13:44

## 2017-12-26 RX ADMIN — INSULIN ASPART SCH UNITS: 100 INJECTION, SOLUTION INTRAVENOUS; SUBCUTANEOUS at 13:40

## 2017-12-26 RX ADMIN — CLOPIDOGREL BISULFATE SCH MG: 75 TABLET, FILM COATED ORAL at 13:41

## 2017-12-26 RX ADMIN — TACROLIMUS SCH MG: 1 CAPSULE ORAL at 08:26

## 2017-12-26 RX ADMIN — PANTOPRAZOLE SCH MG: 40 TABLET, DELAYED RELEASE ORAL at 13:45

## 2017-12-26 RX ADMIN — HEPARIN SODIUM SCH UNIT: 1000 INJECTION, SOLUTION INTRAVENOUS; SUBCUTANEOUS at 11:30

## 2017-12-26 RX ADMIN — HEPARIN SODIUM SCH UNIT: 1000 INJECTION, SOLUTION INTRAVENOUS; SUBCUTANEOUS at 10:30

## 2017-12-26 RX ADMIN — PRAVASTATIN SODIUM SCH MG: 20 TABLET ORAL at 13:44

## 2017-12-26 RX ADMIN — CALCIUM ACETATE SCH MG: 667 CAPSULE ORAL at 13:41

## 2017-12-26 RX ADMIN — CARVEDILOL SCH MG: 3.12 TABLET, FILM COATED ORAL at 13:45

## 2017-12-26 RX ADMIN — CALCIUM ACETATE SCH MG: 667 CAPSULE ORAL at 08:27

## 2017-12-26 NOTE — DIALYSIS PROGRESS NOTE
Nephrology Dialysis Note


Date of Service:


Dec 26, 2017.


Subjective


seen on HD; waiting for outpt HD unit formal acceptance; no n/v/d/sob/confusion





Objective











  Date Time  Temp Pulse Resp B/P (MAP) Pulse Ox O2 Delivery O2 Flow Rate FiO2


 


12/26/17 10:00  70  117/86    


 


12/26/17 09:45  71  120/82    


 


12/26/17 09:30  68  125/87    


 


12/26/17 09:09 36.6 64  139/95 (110)    


 


12/26/17 07:15 36.8 67 18 115/83 (94) 100 Room Air  


 


12/25/17 23:00      Room Air  


 


12/25/17 22:59 36.7 68 15 113/65 (81) 98 Room Air  


 


12/25/17 19:44  80  109/72 (84)    


 


12/25/17 19:16 36.6 90 18 102/67 (79) 99 Room Air  


 


12/25/17 16:00     98 Room Air  


 


12/25/17 15:44 36.5 61 16 143/88 (106) 98 Room Air  








Physical Exam:


General-aaox3, cachectic, lying flat in bed on ra


Eyes-no scleral icterus


ENT-mmm


Neck-supple


Lungs-cta


Heart-rrr


Abdomen-bs+ s/nt/nd


Extremities-no c/c/e


Neuro-hx of cp





Current Inpatient Medications








 Medications


  (Trade)  Dose


 Ordered  Sig/Basilia


 Route  Start Time


 Stop Time Status Last Admin


Dose Admin


 


 Glucose


  (Glucose 40% Gel)  15-30


 GRAMS 15


 GRAMS...  UD  PRN


 PO  12/20/17 17:30


 1/19/18 17:29   


 


 


 Glucose


  (Glucose Chew


 Tab)  4-8


 Tablets 4


 Tabl...  UD  PRN


 PO  12/20/17 17:30


 1/19/18 17:29   


 


 


 Dextrose


  (Dextrose 50%


 50ML Syringe)  25-50ML OF


 50% DW IV


 FOR...  UD  PRN


 IV  12/20/17 17:30


 1/19/18 17:29  12/21/17 06:43


25 ML


 


 Glucagon


  (Glucagon Inj)  1 mg  UD  PRN


 SQ  12/20/17 17:30


 1/19/18 17:29   


 


 


 Allopurinol


  (Zyloprim Tab)  100 mg  DAILY


 PO  12/21/17 09:00


 1/20/18 08:59  12/25/17 08:12


100 MG


 


 Ascorbic Acid


  (Vitamin C Tab)  500 mg  TIDM


 PO  12/21/17 07:30


 1/20/18 07:29  12/25/17 16:59


500 MG


 


 Carvedilol


  (Coreg Tab)  3.125 mg  BID


 PO  12/20/17 21:00


 1/19/18 20:59  12/25/17 19:42


3.125 MG


 


 Clopidogrel


 Bisulfate


  (plAVix TAB)  75 mg  DAILY


 PO  12/21/17 09:00


 1/20/18 08:59 Future hold 12/25/17 08:09


75 MG


 


 Pantoprazole


 Sodium


  (Protonix Tab)  40 mg  DAILY


 PO  12/21/17 09:00


 1/20/18 08:59  12/25/17 08:11


40 MG


 


 Pravastatin Sodium


  (Pravachol Tab)  20 mg  DAILY


 PO  12/21/17 09:00


 1/20/18 08:59  12/25/17 08:10


20 MG


 


 Tacrolimus


  (Prograf Cap)  4 mg  BID


 PO  12/20/17 21:00


 1/19/18 20:59  12/26/17 08:26


4 MG


 


 Amitriptyline HCl


  (Elavil Tab)  150 mg  HS


 PO  12/20/17 21:00


 1/19/18 20:59  12/25/17 20:47


150 MG


 


 Cholecalciferol


  (Vitamin D Tab)  2,000


 inter.unit  DAILY


 PO  12/21/17 09:00


 1/20/18 08:59  12/25/17 08:12


2,000 INTER.UNIT


 


 Miscellaneous


 Information


  (Order Awaiting


 Action)  1 ea  QS


 N/A  12/21/17 00:00


 1/20/18 00:00   


 


 


 Miscellaneous


 Information


  (Consult


 Glycemic


 Management


 Pharmacy)  1 ea  UD  PRN


 N/A  12/20/17 19:11


 1/19/18 19:10   


 


 


 Calcium Acetate


  (Phoslo Cap)  1,334 mg  TIDM


 PO  12/21/17 07:30


 1/20/18 07:29  12/26/17 08:27


1,334 MG


 


 Insulin Glargine


  (Lantus Solostar


 Pen)  10 units  BID


 SC  12/22/17 09:00


 1/21/18 08:59  12/26/17 08:32


10 UNITS


 


 Tramadol HCl


  (Ultram Tab)  50 mg  Q6H  PRN


 PO  12/23/17 00:30


 1/22/18 00:29  12/23/17 00:45


50 MG


 


 Insulin Aspart


  (novoLOG ASPART)  SLIDING


 SCALE  ACHS@1100,1630,2100


 SC  12/25/17 11:00


 1/24/18 10:59  12/25/17 20:52


2 UNITS


 


 Insulin Aspart


  (novoLOG ASPART)  SLIDING


 SCALE  AC@0630


 SC  12/25/17 06:30


 1/24/18 06:29  12/26/17 08:30


10 UNITS


 


 Heparin Sodium


  (Porcine)


  (Heparin Iv


 Bolus)  1,000 unit  TODAY@0900


 IV  12/26/17 09:00


 12/26/17 23:59   


 


 


 Heparin Sodium


  (Porcine)


  (Heparin Iv


 Bolus)  400 unit  TODAY@0900,1000,1100


 IV  12/26/17 09:00


 12/26/17 23:59   


 


 


 Epoetin Den


  (Procrit Inj)  10,000 units  TODAY@0900


 IV.  12/26/17 09:00


 12/26/17 23:59  12/26/17 09:46


10,000 UNITS


 


 Iron Sucrose 100


 mg/Syringe  5 ml @ 1


 mls/min  TODAY@0900


 IV  12/26/17 09:00


 12/26/17 23:59  12/26/17 09:44


1 MLS/MIN











Last 24 Hours








Test


  12/25/17


11:13 12/25/17


16:25 12/25/17


19:43 12/26/17


06:03


 


Bedside Glucose 195 mg/dl  167 mg/dl  153 mg/dl  


 


Hemoglobin    9.9 g/dL 


 


Hematocrit    29.3 % 


 


Sodium Level    139 mmol/L 


 


Potassium Level    4.2 mmol/L 


 


Chloride Level    106 mmol/L 


 


Carbon Dioxide Level    24 mmol/L 


 


Anion Gap    9.0 mmol/L 


 


Blood Urea Nitrogen    49 mg/dl 


 


Creatinine    5.64 mg/dl 


 


Est Creatinine Clear Calc


Drug Dose 


  


  


  11.5 ml/min 


 


 


Estimated GFR (


American) 


  


  


  11.4 


 


 


Estimated GFR (Non-


American 


  


  


  9.8 


 


 


BUN/Creatinine Ratio    8.6 


 


Random Glucose    134 mg/dl 


 


Calcium Level    8.5 mg/dl 


 


Magnesium Level    2.0 mg/dl 


 


Test


  12/26/17


07:23 


  


  


 


 


Bedside Glucose 203 mg/dl    











Assessment & Plan


61 yo male who had tdc 12/21 along w/ first HD tx same date has improved 

clinical status; awaiting final acceptance at  Dialysis unit; acceptance 

already in process; Dr Arzola will continue as outpt nephrologist 








ESRD-doing 2k bath, 3hours,  no fluid removal. 





anemia of renal failure-giving venofer and procrit for goal hg of 10 to 11





CHAD- cont current phoslo. calcium levels are trending back up. has not needed 

further supplemental calcium gluconate





Dialysis unit-needs to be finalized as accepted at the [facility] dialysis unit-

[phone], preferably m-w-f.  aware/application in process





Appreciate consult; will follow with you.  Care coordinated w/ Dr. Sexton.

## 2017-12-26 NOTE — DISCHARGE SUMMARY
Discharge Summary


Date of Service


Dec 26, 2017.





Discharge Summary


Admission Date:


Dec 20, 2017 at 16:01


Discharge Date:  Dec 26, 2017


Discharge Disposition:  Home


Principal Diagnosis:


 ESRD, Hypocalcemia


Procedures:


Insertion of Perm Catheter, Right Internal Jugular Approach, Ultrasound 


Localization of Right Internal Jugular Vein, Fluoroscopy for positioning.





Dialysis


Consultations:


Vascular surgery, Nephrology


Pending Studies/Follow-Up:


Follow up with  on Dec 28th, 2017 at 11:45am


Follow up with your Nephrologist  in 2-4 weeks as advised





Get dialysis as per the recommendations from your Nephrologist


Seek immediate medical attention if your symptoms reoccur or worsen





Medication Reconciliation


New Medications:  


Calcium Acetate (Phoslo 667 Mg) 667 Mg Cap


667 MG PO TIDM for 30 Days, #90 CAP





 


Continued Medications:  


Allopurinol (Allopurinol) 100 Mg Tab


1 TAB PO DAILY





Amitriptyline Hcl (Amitriptyline Hcl) 150 Mg Tab


1 TAB PO HS for 30 Days, #30 TAB 1 Refill





Ascorbic Acid (Ascorbic Acid) 500 Mg Tab


500 MG PO TIDM, TAB





Carvedilol (Carvedilol) 3.125 Mg Tab


1 TAB PO BID





Cholecalciferol (D3 2000) 2,000 Unit Tab


1 TAB PO DAILY





Clopidogrel (Plavix) 75 Mg Tab


75 MG PO DAILY, TAB





Entecavir (Entecavir) 0.5 Mg Tab


1 TAB PO DAILY





Ferrous Sulfate (Ferrous Sulfate) 325 Mg Tab


1 TAB PO TIDM





Insulin Aspart (Novolog Flexpen) 100 Units/Ml Inj


10 UNITS PO with lunch





Insulin Aspart (Novolog) 100 Units/Ml Inj


12 PO with dinner





Insulin Aspart (Novolog Flexpen) 100 Units/Ml Inj


8 UNITS SUBD with breakfast





Insulin Glargine (Lantus) 100 Unit/Ml Inj


30 SC QPM, VIAL





Pantoprazole (Protonix) 40 Mg Tab


40 MG PO DAILY, #30 TAB





Pravastatin (Pravachol ) 20 Mg Tab


20 MG PO DAILY, TAB





Sodium Bicarbonate (Antacid) (Sodium Bicarbonate) 650 Mg Tab


1 TAB PO TID for 30 Days, #90 TAB 5 Refills





Tacrolimus (Prograf) 1 Mg Cap


4 CAP PO BID for 30 Days, #240 CAP 11 Refills











Admission Information


HPI (per Admitting provider):


This is a 61yo M with a PMH of end stage renal disease, poorly controlled DM II

, cerebral palsy, CAD (s/p stents in 2011) and s/p liver transplant (1998) who 

was admitted directly from Dr. Arzola's nephrology clinic. Per patient, he has 

been in discussion with Dr. Arzola about the initiation of dialysis for awhile 

but has opted to continue monitoring labwork. Patient is a poor historian, but 

per chart review, he was admitted at [facility] in [facility] for 

uncontrolled diabetes from 12/6-12/12. During this time, his creatinine was >5. 

Followed up with  after hospitalization for repeat labwork. Based on 

abnormal labs, unintentional weight loss and decreased ability to make urine, 

the decision was made to initiate dialysis. Initially presented to [facility] yesterday but for unclear reasons, patient left. Was then directly 

admitted to [facility]. Has agreed to the elective tunnel catheter placement dialysis 

access by vascular surgery. 





Endorses decreased urinary output. Denies fever, chills, confusion, CP, SOB, 

abd pain, nausea, vomiting, constipation, fatigue. Has cerebral palsy and 

ambulates with a walker. Is more symptomatic on R side.


Physical Exam (per Admitting):  


   General Appearance:  WD/WN, no apparent distress


   Head:  normocephalic, atraumatic


   Eyes:  normal inspection, PERRL, sclerae normal


   ENT:  normal ENT inspection, hearing grossly normal, pharynx normal (moist 

mucous membranes)


   Neck:  supple, no JVD, trachea midline


   Respiratory/Chest:  chest non-tender, lungs clear, normal breath sounds, no 

respiratory distress, no accessory muscle use


   Cardiovascular:  regular rate, rhythm, no murmur, normal peripheral pulses


   Abdomen/GI:  non tender, soft, no organomegaly


   Extremities/Musculoskelatal:  no calf tenderness, no pedal edema, non-tender


   Neurologic/Psych:  alert, normal mood/affect, oriented x 3, + pertinent 

finding (Chronic gait abnormality 2/2 cerebral palsy )


   Skin:  normal color, warm/dry, no rash





Hospital Course





Patient is a 62yr male with PMH of ESRD, poorly controlled DM II, cerebral palsy

, CAD (s/p stents in 2011) and s/p liver transplant (1998) who was admitted 

directly from 's nephrology clinic for initiation of dialysis. 








End stage Renal Disease:


Continue Dialysis per Nephrology 


Got Tunneled Catheter placement on 12/21/17


Appreciate Nephrology help 


Awaiting for outpatient dialysis set up 


Monitor renal function, electrolytes


continue current management


Can be discharged once outpatient dialysis is set up











Uncontrolled DM II:


Last A1C:9.6 in Dec, 2017


A1C:8.9


Blood glucose levels in 400s on presentation


Blood sugar levels improved


DC Insulin drip


ISS, lantus


Pharmacy Glycemic control consulted


Pharmacy to manage Insulin








Anemia of Chronic Disease:


No acute bleeding issues


Monitor Hb


Hb stable


Procrit and Venofer per Nephrology








CAD S/P stents  


H/o MIs in 2009 and 2011


No acute issues  


Continue coreg, statin, Plavix








S/p liver transplant: 


H/o Hepatitic B 


Continue Prograf, Entecavir 








DVT Px:


SCDs 





Code status:


 FULL 








Disposition:


Medically stable for discharge. Plan to DC once outpatient dialysis is set up.





Follow up with  on Dec 28th, 2017 at 11:45am


Follow up with your Nephrologist  in 2-4 weeks as advised





Get dialysis as per the recommendations from your Nephrologist


Seek immediate medical attention if your symptoms reoccur or worsen


Total time spent on discharge = 35 minutes


This includes examination of the patient, discharge planning, medication 

reconciliation, and communication with other providers.





Discharge Instructions


Discharge Instructions


Date of Service


Dec 26, 2017.





Admission


Reason for Admission:  Hypocalcemia, Urimea, Ckd Stage 5





Discharge


Discharge Diagnosis / Problem:  ESRD, Hypocalcemia





Discharge Goals


Goal(s):  Decrease discomfort, Improve function





Activity Recommendations


Activity Limitations:  resume your previous activity


Exercise/Sports Limitations:  as tolerated





.





Instructions / Follow-Up


Instructions / Follow-Up





Follow up with  on Dec 28th, 2017 at 11:45am


Follow up with your Nephrologist  in 2-4 weeks as advised





Get dialysis as per the recommendations from your Nephrologist


Seek immediate medical attention if your symptoms reoccur or worsen





Current Hospital Diet


Patient's current hospital diet: Renal Diet, Diabetes Type 2 Diet





Discharge Diet


Recommended Diet:  Diabetes Type 2 Diet, Renal Diet





Procedures


Procedures Performed:  


Insertion of Perm Catheter, Right Internal Jugular Approach, Ultrasound 


Localization of Right Internal Jugular Vein, Fluoroscopy for positioning.





Pending Studies


Studies pending at discharge:  no





Laboratory Results





Hemoglobin A1c








Test


  12/21/17


05:36 Range/Units


 


 


Estimated Average Glucose 209   mg/dl


 


Hemoglobin A1c 8.9 H 4.5-5.6  %











Medical Emergencies








.


Who to Call and When:





Medical Emergencies:  If at any time you feel your situation is an emergency, 

please call 911 immediately.





.





Non-Emergent Contact


Non-Emergency issues call your:  Primary Care Provider, Nephrologist


Call Non-Emergent contact if:  you have a fever, your pain is not controlled, 

your pain is worsening, your pain is unusual for you, your pain is concerning 

you, you have any medication questions


Seek immediate medical attention if your symptoms reoccur or worsen 


.


.








"Provider Documentation" section prepared by Steven Sexton.








.





VTE Core Measure


Inpt VTE Proph given/why not?:  SCD's











<Electronically signed by Steven Sexton MD>





  Signed:  12/26/17 0957


  Signed:  





The status of this report is Signed


* If report status is Draft, the document has not been finalized by the 

responsible provider.

## 2017-12-26 NOTE — PROGRESS NOTE
Internal Med Progress Note


Date of Service:


Dec 26, 2017.


Provider Documentation:





SUBJECTIVE:





Seen and examined at bedside


Currently getting dialysis


Doing well. 


Denies chest pain, SOB


No other complaints


Awaiting for Outpatient dialysis setup


Discussed with Nephrology today 





OBJECTIVE:





Vital Signs-as noted below





Physical Exam:





General Appearance:Moderately built and nourished, no apparent distress


Head:  normocephalic, Atraumatic 


Eyes:  normal inspection, EOMI, PERRL


Neck:  supple, Trachea midline


Respiratory/Chest: Normal breath sounds, CTA


Cardiovascular: S1, S2, No murmur


Abdomen/GI:Soft, Non tender, Bowel sounds present


Extremities/Musculoskelatal:normal inspection, no edema 


Neurologic/Psych:AAOX3, grossly no focal neurological deficits 


Skin:  normal color, warm








Lab data as noted below.


ASSESSMENT & PLAN:





Patient is a 62yr male with PMH of ESRD, poorly controlled DM II, cerebral palsy

, CAD (s/p stents in 2011) and s/p liver transplant (1998) who was admitted 

directly from 's nephrology clinic for initiation of dialysis. 








End stage Renal Disease:


Continue Dialysis per Nephrology 


Got Tunneled Catheter placement on 12/21/17


Appreciate Nephrology help 


Awaiting for outpatient dialysis set up 


Monitor renal function, electrolytes


continue current management


Can be discharged once outpatient dialysis is set up











Uncontrolled DM II:


Last A1C:9.6 in Dec, 2017


A1C:8.9


Blood glucose levels in 400s on presentation


Blood sugar levels improved


DC Insulin drip


ISS, lantus


Pharmacy Glycemic control consulted


Pharmacy to manage Insulin








Anemia of Chronic Disease:


No acute bleeding issues


Monitor Hb


Hb stable


Procrit and Venofer per Nephrology








CAD S/P stents  


H/o MIs in 2009 and 2011


No acute issues  


Continue coreg, statin, Plavix








S/p liver transplant: 


H/o Hepatitic B 


Continue Prograf, Entecavir 








DVT Px:


SCDs 





Code status:


 FULL 








Disposition:


Medically stable for discharge. Plan to DC once outpatient dialysis is set up.





Follow up with  on Dec 28th, 2017 at 11:45am


Follow up with your Nephrologist  in 2-4 weeks as advised





Get dialysis as per the recommendations from your Nephrologist


Seek immediate medical attention if your symptoms reoccur or worsen


Vital Signs:











  Date Time  Temp Pulse Resp B/P (MAP) Pulse Ox O2 Delivery O2 Flow Rate FiO2


 


12/26/17 09:09 36.6 64  139/95 (110)    


 


12/26/17 07:15 36.8 67 18 115/83 (94) 100 Room Air  


 


12/25/17 23:00      Room Air  


 


12/25/17 22:59 36.7 68 15 113/65 (81) 98 Room Air  


 


12/25/17 19:44  80  109/72 (84)    


 


12/25/17 19:16 36.6 90 18 102/67 (79) 99 Room Air  


 


12/25/17 16:00     98 Room Air  


 


12/25/17 15:44 36.5 61 16 143/88 (106) 98 Room Air  


 


12/25/17 09:51     100 Room Air  








Lab Results:





Results Past 24 Hours








Test


  12/25/17


11:13 12/25/17


16:25 12/25/17


19:43 12/26/17


06:03 Range/Units


 


 


Bedside Glucose 195 167 153  70-99  mg/dl


 


Hemoglobin    9.9 14.0-18.0  g/dL


 


Hematocrit    29.3 42-52  %


 


Sodium Level    139 136-145  mmol/L


 


Potassium Level    4.2 3.5-5.1  mmol/L


 


Chloride Level    106   mmol/L


 


Carbon Dioxide Level    24 21-32  mmol/L


 


Anion Gap    9.0 3-11  mmol/L


 


Blood Urea Nitrogen    49 7-18  mg/dl


 


Creatinine


  


  


  


  5.64


  0.60-1.40


mg/dl


 


Est Creatinine Clear Calc


Drug Dose 


  


  


  11.5


   ml/min


 


 


Estimated GFR (


American) 


  


  


  11.4


   


 


 


Estimated GFR (Non-


American 


  


  


  9.8


   


 


 


BUN/Creatinine Ratio    8.6 10-20  


 


Random Glucose    134 70-99  mg/dl


 


Calcium Level    8.5 8.5-10.1  mg/dl


 


Magnesium Level    2.0 1.8-2.4  mg/dl


 


Test


  12/26/17


07:23 


  


  


  Range/Units


 


 


Bedside Glucose 203    70-99  mg/dl

## 2017-12-26 NOTE — DISCHARGE INSTRUCTIONS
Discharge Instructions


Date of Service


Dec 26, 2017.





Admission


Reason for Admission:  Hypocalcemia, Urimea, Ckd Stage 5





Discharge


Discharge Diagnosis / Problem:  ESRD





Discharge Goals


Goal(s):  Decrease discomfort, Improve function





Activity Recommendations


Activity Limitations:  resume your previous activity


Exercise/Sports Limitations:  as tolerated





.





Instructions / Follow-Up


Instructions / Follow-Up





Follow up with  on Dec 28th, 2017 at 11:45am


Follow up with your Nephrologist  in 2-4 weeks as advised





Get dialysis as per the recommendations from your Nephrologist


Seek immediate medical attention if your symptoms reoccur or worsen





Current Hospital Diet


Patient's current hospital diet: Renal Diet, Diabetes Type 2 Diet





Discharge Diet


Recommended Diet:  Diabetes Type 2 Diet, Renal Diet





Procedures


Procedures Performed:  


Insertion of Perm Catheter, Right Internal Jugular Approach, Ultrasound 


Localization of Right Internal Jugular Vein, Fluoroscopy for positioning.





Pending Studies


Studies pending at discharge:  no





Laboratory Results





Hemoglobin A1c








Test


  12/21/17


05:36 Range/Units


 


 


Estimated Average Glucose 209   mg/dl


 


Hemoglobin A1c 8.9 H 4.5-5.6  %











Medical Emergencies








.


Who to Call and When:





Medical Emergencies:  If at any time you feel your situation is an emergency, 

please call 911 immediately.





.





Non-Emergent Contact


Non-Emergency issues call your:  Primary Care Provider, Nephrologist


Call Non-Emergent contact if:  you have a fever, your pain is not controlled, 

your pain is worsening, your pain is unusual for you, your pain is concerning 

you, you have any medication questions


Seek immediate medical attention if your symptoms reoccur or worsen 


.


.








"Provider Documentation" section prepared by Steven Sexton.








.





VTE Core Measure


Inpt VTE Proph given/why not?:  SCD's

## 2018-03-26 ENCOUNTER — HOSPITAL ENCOUNTER (INPATIENT)
Dept: HOSPITAL 45 - C.EDB | Age: 63
LOS: 7 days | Discharge: HOME | DRG: 314 | End: 2018-04-02
Attending: FAMILY MEDICINE | Admitting: FAMILY MEDICINE
Payer: COMMERCIAL

## 2018-03-26 VITALS
DIASTOLIC BLOOD PRESSURE: 72 MMHG | SYSTOLIC BLOOD PRESSURE: 115 MMHG | OXYGEN SATURATION: 95 % | HEART RATE: 70 BPM | TEMPERATURE: 98.06 F

## 2018-03-26 VITALS
OXYGEN SATURATION: 94 % | DIASTOLIC BLOOD PRESSURE: 68 MMHG | SYSTOLIC BLOOD PRESSURE: 110 MMHG | TEMPERATURE: 97.88 F | HEART RATE: 68 BPM

## 2018-03-26 VITALS
BODY MASS INDEX: 21.21 KG/M2 | WEIGHT: 135.14 LBS | BODY MASS INDEX: 21.21 KG/M2 | WEIGHT: 135.14 LBS | HEIGHT: 67 IN | HEIGHT: 67 IN | BODY MASS INDEX: 21.21 KG/M2

## 2018-03-26 VITALS
HEART RATE: 70 BPM | OXYGEN SATURATION: 95 % | DIASTOLIC BLOOD PRESSURE: 72 MMHG | TEMPERATURE: 98.06 F | SYSTOLIC BLOOD PRESSURE: 115 MMHG

## 2018-03-26 DIAGNOSIS — E11.22: ICD-10-CM

## 2018-03-26 DIAGNOSIS — T82.7XXA: Primary | ICD-10-CM
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
## 2018-03-26 DIAGNOSIS — B95.2: ICD-10-CM

## 2018-03-26 DIAGNOSIS — Z87.891: ICD-10-CM

## 2018-03-26 DIAGNOSIS — Z51.81: ICD-10-CM

## 2018-03-26 DIAGNOSIS — Z94.4: ICD-10-CM

## 2018-03-26 DIAGNOSIS — Z79.4: ICD-10-CM

## 2018-03-26 DIAGNOSIS — Z79.899: ICD-10-CM

## 2018-03-26 DIAGNOSIS — Z99.2: ICD-10-CM

## 2018-03-26 DIAGNOSIS — I25.10: ICD-10-CM

## 2018-03-26 DIAGNOSIS — E11.649: ICD-10-CM

## 2018-03-26 DIAGNOSIS — Z79.02: ICD-10-CM

## 2018-03-26 DIAGNOSIS — G80.9: ICD-10-CM

## 2018-03-26 DIAGNOSIS — N18.6: ICD-10-CM

## 2018-03-26 DIAGNOSIS — Z95.5: ICD-10-CM

## 2018-03-26 LAB
ALBUMIN SERPL-MCNC: 2.5 GM/DL (ref 3.4–5)
ALP SERPL-CCNC: 125 U/L (ref 45–117)
ALT SERPL-CCNC: 28 U/L (ref 12–78)
AST SERPL-CCNC: 19 U/L (ref 15–37)
BASOPHILS # BLD: 0.02 K/UL (ref 0–0.2)
BASOPHILS NFR BLD: 0.2 %
BUN SERPL-MCNC: 60 MG/DL (ref 7–18)
CALCIUM SERPL-MCNC: 7.5 MG/DL (ref 8.5–10.1)
CO2 SERPL-SCNC: 26 MMOL/L (ref 21–32)
CREAT SERPL-MCNC: 6.99 MG/DL (ref 0.6–1.4)
EOS ABS #: 0.08 K/UL (ref 0–0.5)
EOSINOPHIL NFR BLD AUTO: 67 K/UL (ref 130–400)
GLUCOSE SERPL-MCNC: 103 MG/DL (ref 70–99)
HCT VFR BLD CALC: 42.3 % (ref 42–52)
HGB BLD-MCNC: 13.5 G/DL (ref 14–18)
IG#: 0.06 K/UL (ref 0–0.02)
IMM GRANULOCYTES NFR BLD AUTO: 14.7 %
INR PPP: 1.1 (ref 0.9–1.1)
LYMPHOCYTES # BLD: 1.54 K/UL (ref 1.2–3.4)
MCH RBC QN AUTO: 31.6 PG (ref 25–34)
MCHC RBC AUTO-ENTMCNC: 31.9 G/DL (ref 32–36)
MCV RBC AUTO: 99.1 FL (ref 80–100)
MONO ABS #: 0.63 K/UL (ref 0.11–0.59)
MONOCYTES NFR BLD: 6 %
NEUT ABS #: 8.14 K/UL (ref 1.4–6.5)
NEUTROPHILS # BLD AUTO: 0.8 %
NEUTROPHILS NFR BLD AUTO: 77.7 %
PMV BLD AUTO: 10 FL (ref 7.4–10.4)
POTASSIUM SERPL-SCNC: 4 MMOL/L (ref 3.5–5.1)
PROT SERPL-MCNC: 6.8 GM/DL (ref 6.4–8.2)
PTT PATIENT: 30 SECONDS (ref 21–31)
RED CELL DISTRIBUTION WIDTH CV: 13.4 % (ref 11.5–14.5)
RED CELL DISTRIBUTION WIDTH SD: 48.1 FL (ref 36.4–46.3)
SODIUM SERPL-SCNC: 139 MMOL/L (ref 136–145)
WBC # BLD AUTO: 10.47 K/UL (ref 4.8–10.8)

## 2018-03-26 RX ADMIN — CALCIUM ACETATE SCH MG: 667 CAPSULE ORAL at 17:23

## 2018-03-26 RX ADMIN — TACROLIMUS SCH MG: 1 CAPSULE ORAL at 21:29

## 2018-03-26 RX ADMIN — INSULIN ASPART SCH UNITS: 100 INJECTION, SOLUTION INTRAVENOUS; SUBCUTANEOUS at 16:30

## 2018-03-26 RX ADMIN — Medication PRN TABS: at 17:31

## 2018-03-26 RX ADMIN — HEPARIN SODIUM SCH UNIT: 10000 INJECTION, SOLUTION INTRAVENOUS; SUBCUTANEOUS at 21:31

## 2018-03-26 RX ADMIN — FERROUS SULFATE TAB EC 325 MG (65 MG FE EQUIVALENT) SCH MG: 325 (65 FE) TABLET DELAYED RESPONSE at 17:23

## 2018-03-26 RX ADMIN — AMITRIPTYLINE HYDROCHLORIDE SCH MG: 50 TABLET, FILM COATED ORAL at 21:28

## 2018-03-26 RX ADMIN — INSULIN ASPART SCH UNITS: 100 INJECTION, SOLUTION INTRAVENOUS; SUBCUTANEOUS at 21:30

## 2018-03-26 RX ADMIN — Medication PRN TABS: at 17:06

## 2018-03-26 NOTE — NEPHROLOGY CONSULTATION
Nephrology Consultation


Date of Consultation:


Mar 26, 2018.


Attending Physician:  Dr Olson


Requesting Physician:  Dr Olson


Reason for Consultation:  bacteremia


History of Present Illness


63 year old male who had chills at his [facility] dialysis unit on 3/23 w/o F 

or hypotension and had blood cultures drawn which came back 48 hrs later + for 

GPC in pairs/ chains.  I was on call for that unit over the weekend and tried 

to reach him by phone yesterday w/o success.  PMH includes liver txplt, in 

center HD via TDC, longstanding dm, FORREST, cerebral plasy affecting R, CAD s/p 

setnt x 2.  He dialyzes under Dr Arzola's care.  He had no antibiotics after cxs 

drawn as he had no F.  He has not had his treatment today.  He is afebrile and 

on RA.  HE has no c/o chills, f, sob or other concerning sx today.  He is 

having low BG and is getting repeated therapy for that.





Past Medical/Surgical History


-liver transplant for hep B cirrhosis 1998 follows at [facility]


-diabetes since 1998


-ESRD on HD only in 2018


-CAD s/p stent x 2


-sleep apnea


-cerebral palsy w/ R sided weakness


-s/p hip replacement





Family History


no ckd/esrd





Social History


Smoking Status:  Former Smoker (quit 1997)


Alcohol Use:  none


Drug Use:  none


Marital Status:  single


Occupation Status:  unemployed





Allergies


Coded Allergies:  


     No Known Allergies (Unverified , 3/26/18)





Medications





Current Inpatient Medications








 Medications


  (Trade)  Dose


 Ordered  Sig/Basilia


 Route  Start Time


 Stop Time Status Last Admin


Dose Admin


 


 Heparin Sodium


  (Porcine)


  (Heparin Sq 5000


 Unit/0.5ml)  5,000 unit  Q8


 SQ  3/26/18 22:00


 4/25/18 21:59   


 


 


 Acetaminophen


  (Tylenol Tab)  650 mg  Q4H  PRN


 PO  3/26/18 14:30


 4/25/18 14:29   


 


 


 Insulin Glargine


  (Lantus Solostar


 Pen)  20 units  HS


 SC  3/26/18 21:00


 4/25/18 20:59   


 


 


 Insulin Aspart


  (novoLOG ASPART)  **SLIDING


 SCALE**


 **If C...  TID@1100,1630,2100


 SC  3/26/18 16:30


 4/25/18 16:29   


 


 


 Glucose


  (Glucose 40% Gel)  15-30


 GRAMS 15


 GRAMS...  UD  PRN


 PO  3/26/18 15:15


 4/25/18 15:14   


 


 


 Glucose


  (Glucose Chew


 Tab)  4-8


 Tablets 4


 Tabl...  UD  PRN


 PO  3/26/18 15:15


 4/25/18 15:14   


 


 


 Dextrose


  (Dextrose 50%


 50ML Syringe)  25-50ML OF


 50% DW IV


 FOR...  UD  PRN


 IV  3/26/18 15:15


 4/25/18 15:14   


 


 


 Glucagon


  (Glucagon Inj)  1 mg  UD  PRN


 SQ  3/26/18 15:15


 4/25/18 15:14   


 


 


 Miscellaneous


 Information


  (Consult


 Glycemic


 Management


 Pharmacy)  1 ea  UD  PRN


 N/A  3/26/18 15:22


 4/25/18 15:21   


 


 


 Miscellaneous


 Information


  (Consult)  1 ea  UD  PRN


 N/A  3/26/18 15:30


 4/25/18 15:29   


 


 


 Allopurinol


  (Zyloprim Tab)  100 mg  DAILY


 PO  3/27/18 09:00


 4/26/18 08:59   


 


 


 Calcium Acetate


  (Phoslo Cap)  667 mg  TIDM


 PO  3/26/18 17:00


 4/25/18 17:59   


 


 


 Clopidogrel


 Bisulfate


  (plAVix TAB)  75 mg  DAILY


 PO  3/27/18 09:00


 4/26/18 08:59   


 


 


 Ferrous Sulfate


  (Feosol Tab)  325 mg  TIDM


 PO  3/26/18 17:00


 4/25/18 17:59   


 


 


 Pantoprazole


 Sodium


  (Protonix Tab)  40 mg  DAILY


 PO  3/27/18 09:00


 4/26/18 08:59   


 


 


 Pravastatin Sodium


  (Pravachol Tab)  20 mg  DAILY


 PO  3/27/18 09:00


 4/26/18 08:59   


 


 


 Tacrolimus


  (Prograf Cap)  4 mg  BID


 PO  3/26/18 21:00


 4/25/18 20:59   


 


 


 Amitriptyline HCl


  (Elavil Tab)  150 mg  HS


 PO  3/26/18 21:00


 4/25/18 20:59   


 


 


 Cholecalciferol


  (Vitamin D Tab)  2,000


 inter.unit  DAILY


 PO  3/27/18 09:00


 4/26/18 08:59   


 


 


 Miscellaneous


 Information


  (Order Awaiting


 Action)  1 ea  QS


 N/A  3/26/18 16:00


 4/25/18 15:59   


 


 


 Insulin Aspart


  (novoLOG ASPART)  **SLIDING


 SCALE**


 **If C...  QDB


 SC  3/27/18 08:00


 4/26/18 07:59   


 











Home Meds and Scripts








 Medications  Dose


 Route/Sig


 Max Daily Dose Days Date Category


 


 Phoslo 667 Mg


  (Calcium Acetate)


 667 Mg Cap  667 Mg


 PO TIDM


   30 12/26/17 Rx


 


 Ferrous Sulfate


 325 Mg Tab  1 Tab


 PO TIDM


    12/20/17 Reported


 


 Lantus (Insulin


 Glargine) 100


 Unit/Ml Inj  35


 SC QPM


    12/20/17 Reported


 


 Novolog Flexpen


  (Insulin Aspart)


 100 Units/Ml Inj  16 Units


 SQ DINNER


    12/20/17 Reported


 


 Novolog (Insulin


 Aspart) 100


 Units/Ml Inj  12 Units


 SQ BREAKFAST


    12/20/17 Reported


 


 Novolog Flexpen


  (Insulin Aspart)


 100 Units/Ml Inj  14 Units


 SQ LUNCH


    12/20/17 Reported


 


 Allopurinol 100


 Mg Tab  1 Tab


 PO DAILY


    12/20/17 Reported


 


 Entecavir 0.5 Mg


 Tab  1 Tab


 PO DAILY


    12/20/17 Reported


 


 Plavix


  (Clopidogrel


 Bisulfate) 75 Mg


 Tab  75 Mg


 PO DAILY


    12/20/17 Reported


 


 Prograf


  (Tacrolimus) 1 Mg


 Cap  4 Cap


 PO BID


   30 12/20/17 Reported


 


 Pravachol


  (Pravastatin


 Sodium) 20 Mg Tab  20 Mg


 PO DAILY


    12/20/17 Reported


 


 Protonix


  (Pantoprazole


 Sodium) 40 Mg Tab  40 Mg


 PO DAILY


    12/20/17 Reported


 


 D3 2000


  (Cholecalciferol)


 2,000 Unit Tab  1 Tab


 PO DAILY


    12/20/17 Reported


 


 Amitriptyline Hcl


 150 Mg Tab  1 Tab


 PO HS


   30 12/20/17 Reported











Review of Systems


Constitutional:  + see HPI, + chills, No fever, No weakness


Eyes:  No worsening of vision


ENT:  No hearing loss


Respiratory:  No shortness of breath


Cardiac:  No chest pain, No edema


Abdomen:  No pain, No nausea, No vomiting, No diarrhea, No constipation


Musculoskeletal:  No joint pain, No muscle pain


Male :  + problem reported (no change in chronic voiding habits)


Neuro:  No memory loss, No weakness


Psych:  No depression symptoms, No anxiety


Heme:  No abnormal bleeding/bruising


Endo:  + fatigue


Skin:  No rash, No itch





Physical Exam











  Date Time  Temp Pulse Resp B/P (MAP) Pulse Ox O2 Delivery O2 Flow Rate FiO2


 


3/26/18 16:28 36.7 70 20 115/72 (86) 95 Room Air  


 


3/26/18 15:42  76 18 94/65 94   


 


3/26/18 13:55  79 20 91/64 94 Room Air  


 


3/26/18 12:36  82      


 


3/26/18 12:35     97 Room Air  


 


3/26/18 11:56 36.5 92 18 91/61 97 Room Air  














24-Hour Column 


 


 3/27/18





 08:00


 


Intake Total 270 ml


 


Balance 270 ml








General Appearance:  no apparent distress, + cachetic (on ra eating supper)


Eyes:  EOMI


ENT:  hearing grossly normal, + nasal congestion


Respiratory/Chest:  no respiratory distress, + decreased breath sounds


Cardiovascular:  regular rate, rhythm, no edema


Abdomen:  normal bowel sounds, non tender, soft, + pertinent finding (no orozco)


Extremities:  no pedal edema, + pertinent finding (avf + t/b)


Neurologic/Psych:  alert, normal mood/affect, oriented x 3 (but some limits w/ 

history details >> thinks has fungal infection, states avf placed 4/1/18)


Skin:  no jaundice, warm/dry, no rash





Diagnostics





Last 24 Hours








Test


  3/26/18


12:32 3/26/18


12:35


 


White Blood Count 10.47 K/uL  


 


Red Blood Count 4.27 M/uL  


 


Hemoglobin 13.5 g/dL  


 


Hematocrit 42.3 %  


 


Mean Corpuscular Volume 99.1 fL  


 


Mean Corpuscular Hemoglobin 31.6 pg  


 


Mean Corpuscular Hemoglobin


Concent 31.9 g/dl 


  


 


 


Platelet Count 67 K/uL  


 


Mean Platelet Volume 10.0 fL  


 


Neutrophils (%) (Auto) 77.7 %  


 


Lymphocytes (%) (Auto) 14.7 %  


 


Monocytes (%) (Auto) 6.0 %  


 


Eosinophils (%) (Auto) 0.8 %  


 


Basophils (%) (Auto) 0.2 %  


 


Neutrophils # (Auto) 8.14 K/uL  


 


Lymphocytes # (Auto) 1.54 K/uL  


 


Monocytes # (Auto) 0.63 K/uL  


 


Eosinophils # (Auto) 0.08 K/uL  


 


Basophils # (Auto) 0.02 K/uL  


 


RDW Standard Deviation 48.1 fL  


 


RDW Coefficient of Variation 13.4 %  


 


Immature Granulocyte % (Auto) 0.6 %  


 


Immature Granulocyte # (Auto) 0.06 K/uL  


 


Platelet Estimate DECREASED  


 


Prothrombin Time 12.0 SECONDS  


 


Prothromb Time International


Ratio 1.1 


  


 


 


Activated Partial


Thromboplast Time 30.0 SECONDS 


  


 


 


Partial Thromboplastin Ratio 1.2  


 


Sodium Level 139 mmol/L  


 


Potassium Level 4.0 mmol/L  


 


Chloride Level 103 mmol/L  


 


Carbon Dioxide Level 26 mmol/L  


 


Anion Gap 10.0 mmol/L  


 


Blood Urea Nitrogen 60 mg/dl  


 


Creatinine 6.99 mg/dl  


 


Est Creatinine Clear Calc


Drug Dose 9.5 ml/min 


  


 


 


Estimated GFR (


American) 8.7 


  


 


 


Estimated GFR (Non-


American 7.5 


  


 


 


BUN/Creatinine Ratio 8.5  


 


Random Glucose 103 mg/dl  


 


Calcium Level 7.5 mg/dl  


 


Total Bilirubin 0.5 mg/dl  


 


Aspartate Amino Transf


(AST/SGOT) 19 U/L 


  


 


 


Alanine Aminotransferase


(ALT/SGPT) 28 U/L 


  


 


 


Alkaline Phosphatase 125 U/L  


 


Total Protein 6.8 gm/dl  


 


Albumin 2.5 gm/dl  


 


Globulin 4.3 gm/dl  


 


Albumin/Globulin Ratio 0.6  


 


Bedside Lactic Acid Venous  0.71 mmol/L 








Diagnostic Radiology:


cxr > no acute CP findings


EKG:


prolonged QT, junctional rhythm which is new; ? inf infarct per confirmed 

reading





Assessment & Plan


62 y/o M w/ ESRD on MWF HD via tunnelled line and hx of liver txplt, new onset 

diabetes after txplt, htn sent to ER d/t bacteremia w/ 4/4 blood culture 

bottles from 3/23 w/ GPC in chains and pairs. Also w/ heel wound.





ESRD on HD


volume wise he is on RA w/ acceptable blood pressures.  chemistries, hgb are 

also acceptable


-have renal nurse evaluate for possible use of avf


-repeat cultures have been drawn; had a dose of vancomycin in ER


-given liver txplt hx/ immunosuppressed status, lower threshold than usual to 

remove tdc > consult vascular to get it pulled, culture tip (though he has 

already had abtx)





Bacteremia 


-remove tdc as above <> consult placed


-NPO at [facility] 


-f/u pending cxs including those from outside system


-vanco acceptable for now 





S/p Liver txplt


-continue routine tacro


-states he needs to be set up w/ liver txplt at d/c again; will facilitate





ECG changes


-d/w hospitalist and admitting team; pt w/o sx





Appreciate consultation; will follow with you.

## 2018-03-26 NOTE — PHARMACY PROGRESS NOTE
Glycemic Control Intl Consult


Date of Service


Mar 26, 2018.





Scope


Glycemic Pharmacist consulted by IRIS Ramos on 3/26/18 for glycemic 

control and to write orders per [facility] inpatient glycemic control protocol





Objective


Weight (Kilograms):  62.800


Accuchecks BSG (last 24hrs):











Test


  3/26/18


12:32


 


Random Glucose


  103 mg/dl


(70-99)








Laboratory Data (last 24hrs)











Test


  3/26/18


12:32


 


Anion Gap 10.0 mmol/L 


 


BUN/Creatinine Ratio 8.5 


 


Blood Urea Nitrogen 60 mg/dl 


 


Creatinine 6.99 mg/dl 


 


Potassium Level 4.0 mmol/L 


 


Sodium Level 139 mmol/L 


 


White Blood Count 10.47 K/uL 


 


Red Blood Count 4.27 M/uL 


 


Hemoglobin 13.5 g/dL 


 


Hematocrit 42.3 % 


 


Mean Corpuscular Volume 99.1 fL 


 


Mean Corpuscular Hemoglobin 31.6 pg 


 


Mean Corpuscular Hemoglobin


Concent 31.9 g/dl 


 


 


Platelet Count 67 K/uL 


 


Mean Platelet Volume 10.0 fL 


 


Neutrophils (%) (Auto) 77.7 % 


 


Lymphocytes (%) (Auto) 14.7 % 


 


Monocytes (%) (Auto) 6.0 % 


 


Eosinophils (%) (Auto) 0.8 % 


 


Basophils (%) (Auto) 0.2 % 


 


Neutrophils # (Auto) 8.14 K/uL 


 


Lymphocytes # (Auto) 1.54 K/uL 


 


Monocytes # (Auto) 0.63 K/uL 


 


Eosinophils # (Auto) 0.08 K/uL 


 


Basophils # (Auto) 0.02 K/uL 











Recent Pertinent Medications


Outpatient Anti-diabetic Regimen: 


* Lantus 35 units qPM plus Novolog 12 units with breakfast, 14 units with lunch

, and 16 units with supper








Risk Factors for Insulin Resistance:


* Diet: AHA/Renal diet


* Infection: GP bacteremia on vancomycin





Assessment & Plan


ASSESSMENT:


* Mr Lara is a 62 y/o M with a PMH of liver transplant, hepatitis B, 

hemodialysis MWF, and unknown control of type 2 diabetes (HbA1C is not reliable 

in this patient as he undergoes hemodialysis which can effect the results). He 

presented after a phone call from his dialysis center indicating that he has 

bacteremia of fungemia. 


* During a previous hospitalization in December of last year, the patient 

consistently required 10 units of Lantus BID plus around 20-27 units of 

correctional insulin. In order to create an easier transition to outpatient 

therapy, will start Lantus 20 units tonight. Will mimic similar Novolog dosing 

as previously utilizing. That hospitalization is very similar to today's with 

the exception that the patient currently has an infection. This indicates that 

he may require more insulin, most likely not less. Blood sugar at lunch was 103 

mg/dL which is reasonable.





PLAN FOR INPATIENT GLYCEMIC CONTROL:





* Basal insulin with LANTUS 20 units SQ HS





* Correctional Insulin with NOVOLOG per scale ACHS or Q6hrs while NPO


 * Goal Range:  Low 110 mg/dL - High 140 mg/dL


 * Correction Factor: 20 mg/dL/unit


 * Nutritional / Prandial insulin per carb ratio of 1 unit per 8 grams CHO 

consumed with breakfast and 10 units with all other meals





* Please note that the plan above was derived based on current level of insulin 

resistance and hospital stress. These recommendations are appropriate for 

inpatient admission only. Plan of care upon discharge will need to be 

reassessed to avoid potential outpatient hypo/hyperglycemia. 





Thank you.

## 2018-03-26 NOTE — HISTORY AND PHYSICAL
History & Physical


Date & Time of Service:


Mar 26, 2018 ~ 14:00


Chief Complaint:


Referred by nephrology for positive blood cultures


Primary Care Physician:


Gonzalo Rothman D.O.


History of Present Illness


63-year-old male who presents to the ER by referral of nephrology after 

outpatient blood cultures were noted to be positive.  Patient is currently on 

dialysis Monday Wednesday Friday and have blood cultures drawn on 3/23/18.  

Patient reports he had been feeling chilled and more fatigued than normal so 

therefore blood cultures were drawn.  Patient currently has a tunneled catheter 

in place to his right chest.  Patient did undergo fistula placement at the 

beginning of February however has not been cleared by vascular surgery to use 

the fistula yet.  Patient denies any drainage or surrounding redness from the 

tunnel catheter site.  He has a wound on his right heel that he is currently 

following with wound care for.  There is some mild surrounding erythema however 

he reports this is chronic.  He has not noted any drainage from the wound.  

Patient reports chills however did not take his temperature at home.  He 

reports he did not have a fever at dialysis on Friday.  He denies chest pain 

and shortness of breath.  No lightheadedness, dizziness, diaphoresis, or 

syncopal events.  He denies abdominal pain, nausea, vomiting, diarrhea.  

Patient does continue to make urine despite being on dialysis.  He denies any 

urinary symptoms.  Of note, patient reports he was instructed to stop his 

carvedilol about 1 week ago due to hypotension noted at dialysis.  In the ED, 

patient is hemodynamically stable.  Labs unremarkable.  Patient was given a 

dose of vancomycin.





Past Medical/Surgical History


Medical Problems:


(1) Cerebral palsy


Status: Chronic  





(2) Diabetes mellitus type II, uncontrolled


Status: Chronic  





(3) End stage chronic kidney disease


Status: Chronic  





(4) ESRD (end stage renal disease) on dialysis


Status: Chronic  





(5) Hepatitis B


Status: Chronic  





(6) Presence of stent in coronary artery in patient with coronary artery disease


Status: Chronic  





Surgical Problems:


(1) History of right hip replacement


Status: Chronic  





(2) S/P liver transplant


Status: Chronic  











Family History


Patient reports unknown family history





Social History


Smoking Status:  Former Smoker


Alcohol Use:  none





Immunizations


History of Influenza Vaccine:  Yes


Influenza Vaccine Date:  Aug 18, 2017


History of Tetanus Vaccine?:  Yes


Tetanus Immunization Date:  May 9, 2016


History of Pneumococcal:  Yes


Pneumococcal Date:  Nov 7, 2016





Allergies


Coded Allergies:  


     No Known Allergies (Unverified , 3/26/18)





Home Medications


Scheduled


Allopurinol (Allopurinol), 1 TAB PO DAILY


Amitriptyline Hcl (Amitriptyline Hcl), 1 TAB PO HS


Calcium Acetate (Phoslo 667 Mg), 667 MG PO TIDM


Cholecalciferol (D3 2000), 1 TAB PO DAILY


Clopidogrel (Plavix), 75 MG PO DAILY


Entecavir (Entecavir), 1 TAB PO DAILY


Ferrous Sulfate (Ferrous Sulfate), 1 TAB PO TIDM


Insulin Aspart (Novolog Flexpen), 14 UNITS SQ lunch


Insulin Aspart (Novolog), 12 UNITS SQ breakfast


Insulin Aspart (Novolog Flexpen), 16 UNITS SQ dinner


Insulin Glargine (Lantus), 35 SC QPM


Pantoprazole (Protonix), 40 MG PO DAILY


Pravastatin (Pravachol ), 20 MG PO DAILY


Tacrolimus (Prograf), 4 CAP PO BID





Review of Systems


ROS per HPI, all other systems reviewed and negative





Physical Exam


Vital Signs











  Date Time  Temp Pulse Resp B/P (MAP) Pulse Ox O2 Delivery O2 Flow Rate FiO2


 


3/26/18 16:28 36.7 70 20 115/72 (86) 95 Room Air  


 


3/26/18 15:42  76 18 94/65 94   


 


3/26/18 13:55  79 20 91/64 94 Room Air  


 


3/26/18 12:36  82      


 


3/26/18 12:35     97 Room Air  


 


3/26/18 11:56 36.5 92 18 91/61 97 Room Air  








General Appearance:  WD/WN, no apparent distress


Head:  normocephalic, atraumatic


Eyes:  normal inspection, EOMI, sclerae normal


ENT:  hearing grossly normal, + pertinent finding (Mucous membranes moist)


Neck:  supple, no JVD, trachea midline


Respiratory/Chest:  lungs clear, normal breath sounds, no respiratory distress, 

+ pertinent finding (Tunnel catheter in place to right chest, no surrounding 

erythema or drainage)


Cardiovascular:  regular rate, rhythm, normal peripheral pulses, + pertinent 

finding (+1 ankle edema)


Abdomen/GI:  normal bowel sounds, non tender, soft, no organomegaly


Extremities/Musculoskelatal:  no calf tenderness, normal capillary refill, + 

pertinent finding (BLLE atrophied, right foot drop)


Neurologic/Psych:  no motor/sensory deficits, alert, normal mood/affect, 

oriented x 3


Skin:  normal color, warm/dry





Diagnostics


Laboratory Results





Results Past 24 Hours








Test


  3/26/18


12:32 3/26/18


12:35 Range/Units


 


 


White Blood Count 10.47  4.8-10.8  K/uL


 


Red Blood Count 4.27  4.7-6.1  M/uL


 


Hemoglobin 13.5  14.0-18.0  g/dL


 


Hematocrit 42.3  42-52  %


 


Mean Corpuscular Volume 99.1    fL


 


Mean Corpuscular Hemoglobin 31.6  25-34  pg


 


Mean Corpuscular Hemoglobin


Concent 31.9


  


  32-36  g/dl


 


 


Platelet Count 67  130-400  K/uL


 


Mean Platelet Volume 10.0  7.4-10.4  fL


 


Neutrophils (%) (Auto) 77.7   %


 


Lymphocytes (%) (Auto) 14.7   %


 


Monocytes (%) (Auto) 6.0   %


 


Eosinophils (%) (Auto) 0.8   %


 


Basophils (%) (Auto) 0.2   %


 


Neutrophils # (Auto) 8.14  1.4-6.5  K/uL


 


Lymphocytes # (Auto) 1.54  1.2-3.4  K/uL


 


Monocytes # (Auto) 0.63  0.11-0.59  K/uL


 


Eosinophils # (Auto) 0.08  0-0.5  K/uL


 


Basophils # (Auto) 0.02  0-0.2  K/uL


 


RDW Standard Deviation 48.1  36.4-46.3  fL


 


RDW Coefficient of Variation 13.4  11.5-14.5  %


 


Immature Granulocyte % (Auto) 0.6   %


 


Immature Granulocyte # (Auto) 0.06  0.00-0.02  K/uL


 


Platelet Estimate DECREASED   


 


Prothrombin Time


  12.0


  


  9.0-12.0


SECONDS


 


Prothromb Time International


Ratio 1.1


  


  0.9-1.1  


 


 


Activated Partial


Thromboplast Time 30.0


  


  21.0-31.0


SECONDS


 


Partial Thromboplastin Ratio 1.2   


 


Sodium Level 139  136-145  mmol/L


 


Potassium Level 4.0  3.5-5.1  mmol/L


 


Chloride Level 103    mmol/L


 


Carbon Dioxide Level 26  21-32  mmol/L


 


Anion Gap 10.0  3-11  mmol/L


 


Blood Urea Nitrogen 60  7-18  mg/dl


 


Creatinine


  6.99


  


  0.60-1.40


mg/dl


 


Est Creatinine Clear Calc


Drug Dose 9.5


  


   ml/min


 


 


Estimated GFR (


American) 8.7


  


   


 


 


Estimated GFR (Non-


American 7.5


  


   


 


 


BUN/Creatinine Ratio 8.5  10-20  


 


Random Glucose 103  70-99  mg/dl


 


Calcium Level 7.5  8.5-10.1  mg/dl


 


Total Bilirubin 0.5  0.2-1  mg/dl


 


Aspartate Amino Transf


(AST/SGOT) 19


  


  15-37  U/L


 


 


Alanine Aminotransferase


(ALT/SGPT) 28


  


  12-78  U/L


 


 


Alkaline Phosphatase 125    U/L


 


Total Protein 6.8  6.4-8.2  gm/dl


 


Albumin 2.5  3.4-5.0  gm/dl


 


Globulin 4.3  2.5-4.0  gm/dl


 


Albumin/Globulin Ratio 0.6  0.9-2  


 


Bedside Lactic Acid Venous


  


  0.71


  0.90-1.70


mmol/L








Microbiology Results


3/26/18 Fungal Smear, Received


          Pending


3/26/18 Fungal Culture, Received


          Pending


3/26/18 Blood Culture, Received


          Pending


3/26/18 Blood Culture, Received


          Pending





Diagnostic Radiology


CXR IMPRESSION:  No acute cardiopulmonary findings.





Impression


Assessment and Plan


GRAM-POSITIVE BACTEREMIA


-Admit to telemetry


-Patient referred to the ED by nephrology after outpatient blood cultures that 

were drawn at dialysis on 3/23/18 became positive for gram-positive cocci in 

pairs and chains


-Patient reports feeling chilled and fatigued, however is afebrile, 

hemodynamically stable, no leukocytosis


-Does have right chest tunneled catheter in place for dialysis, currently 

without erythema or drainage


-Has healing ulcer on right heel, does not appear to be actively infected


-Had fistula placed to right upper extremity by vascular surgery at [facility]

, however has not been seen in follow-up for evaluation of maturity


-Preliminary culture report obtained from send out lab and placed on chart, 

they report that final sensitivities should be available by 3/28/18


-S/P vancomycin in the ED, will continue with


-Check echocardiogram R/O endocarditis


-Consult ID





ESRD ON DIALYSIS


-Potassium stable


-Case discussed with Dr. Humphrey


-Will evaluate fistula for maturity


-Continue routine renal medications





HISTORY OF CAD


-Stable, no reports of chest pain


-Beta-blocker recently stopped due to hypotension at dialysis


-Continue clopidogrel





DIABETES MELLITUS


-Hgb A1c 8.9 12/2017


-Continue Lantus and NovoLog





DVT PROPHYLAXIS


-SQ heparin





DISPOSITION


-In my clinical judgment this beneficiary meets acute admission criteria, 

established by CMS, that includes being hospitalized through two midnights.





ADDENDUM:





This is a 63 year old male with a recent initiation of dialysis, AV fistula on 

R arm, not yet fully matured; insulin dependent DM, CAD, cerebral palsy - 

presents with positive blood cultures as an outpatient; growing gram positive 

cocci x4. Was sent over by nephrology. Patient states he has no symptoms. Noted 

to have dialysis catheter on the R chest wall. The blood cultures were obtained 

from the catheter.





On exam: noted to have the tunneled catheter on the R chest wall


he has a healing wound ulceration on the R lateral ankle and L shin





Plan:


for the gram positive bacteremia - will order IV Vanco


repeat blood cultures pending


echo ordered


ID consulted


vascular surgery consulted regarding the tunneled line - culture of the cath 

tip ordered


noted to have EKG changes; so serial troponins ordered, echo pending - denies 

any symptoms at this time - monitor in tele


nephrology consulted for dialysis management


pharmacy consulted for glycemic control - patient noted to have hypoglycemia 

episodes; given glucose tabs with good response





Resuscitation Status








VTE Prophylaxis


Will order VTE Prophylaxis:  Yes

## 2018-03-26 NOTE — EMERGENCY ROOM VISIT NOTE
History


Report prepared by Ruben:  Dulce Villalta


Under the Supervision of:  Dr. Klaus Benítez M.D.


First contact with patient:  12:13


Chief Complaint:  INFECTION


Stated Complaint:  BLOOD INFECTION


Nursing Triage Summary:  


had blood work done friday was called from dialysis and they told me I had a 


blood infection. We just got the phone call to bring him here to the er. "I am 

a 


liver transplant patient and I need certian meds." patient has fistula in right 


arm gets dialysis [id number] was to have dialysis today at 1130





History of Present Illness


The patient is a 63 year old male who presents to the Emergency Room with 

complaints of persistent blood infection starting 3 days ago. The patient 

receives dialysis on Monday, Wednesday, and Friday. He had blood work 3 days 

ago that found that he has a blood fungus. He was told to come to the ED. He 

does make urine. He has been feeling weak.





   Source of History:  patient, family


   Onset:  3 days ago


   Position:  other (blood)


   Quality:  other (infection)


   Timing:  other (persistent)


   Associated Symptoms:  + weakness





Review of Systems


See HPI for pertinent positives & negatives. A total of 10 systems reviewed and 

were otherwise negative.





Past Medical & Surgical


Medical Problems:


(1) Bacteremia


(2) Cerebral palsy


(3) Diabetes mellitus type II, uncontrolled


(4) End stage chronic kidney disease


(5) Hepatitis B


(6) Presence of stent in coronary artery in patient with coronary artery disease


Surgical Problems:


(1) S/P liver transplant








Family History


No pertinent family history stated.





Social History


Smoking Status:  Never Smoker


Marital Status:  single


Occupation Status:  unemployed





Current/Historical Medications


Scheduled


Allopurinol (Allopurinol), 1 TAB PO DAILY


Amitriptyline Hcl (Amitriptyline Hcl), 1 TAB PO HS


Calcium Acetate (Phoslo 667 Mg), 667 MG PO TIDM


Cholecalciferol (D3 2000), 1 TAB PO DAILY


Clopidogrel (Plavix), 75 MG PO DAILY


Entecavir (Entecavir), 1 TAB PO DAILY


Ferrous Sulfate (Ferrous Sulfate), 1 TAB PO TIDM


Insulin Aspart (Novolog Flexpen), 14 UNITS SQ lunch


Insulin Aspart (Novolog), 12 UNITS SQ breakfast


Insulin Aspart (Novolog Flexpen), 16 UNITS SQ dinner


Insulin Glargine (Lantus), 35 SC QPM


Pantoprazole (Protonix), 40 MG PO DAILY


Pravastatin (Pravachol ), 20 MG PO DAILY


Tacrolimus (Prograf), 4 CAP PO BID





Allergies


Coded Allergies:  


     No Known Allergies (Unverified , 3/26/18)





Physical Exam


Vital Signs











  Date Time  Temp Pulse Resp B/P (MAP) Pulse Ox O2 Delivery O2 Flow Rate FiO2


 


3/26/18 13:55  79 20 91/64 94 Room Air  


 


3/26/18 12:36  82      


 


3/26/18 12:35     97 Room Air  


 


3/26/18 11:56 36.5 92 18 91/61 97 Room Air  











Physical Exam


GENERAL: Awake, alert, well-appearing, in no acute distress


HENT: Normocephalic, atraumatic. Oropharynx unremarkable.


EYES: Normal conjunctiva. Sclera non-icteric.


NECK: Supple. No nuchal rigidity. FROM. No JVD.


RESPIRATORY: Clear to auscultation.


CARDIAC: Regular rate, normal rhythm. Extremities warm and well perfused. 

Pulses equal.


ABDOMEN: Soft, non-distended. No tenderness to palpation. No rebound or 

guarding. No masses.


RECTAL: Deferred.


MUSCULOSKELETAL: Chest examination reveals no tenderness. Port in place in 

chest wall. The back is symmetrical on inspection without obvious abnormality. 

There is no CVA tenderness to palpation. No joint edema. 


LOWER EXTREMITIES: Calves are equal size bilaterally and non-tender. No edema. 

No discoloration. 


NEURO: Normal sensorium. No sensory or motor deficits noted. 


SKIN: No rash or jaundice noted.





Medical Decision & Procedures


ER Provider


Diagnostic Interpretation:


X-ray results as stated below per interpretation by me and the radiologist: 





CHEST ONE VIEW PORTABLE





CLINICAL HISTORY: Sepsis    





COMPARISON STUDY:  No previous studies for comparison.





FINDINGS: A dual lumen right internal jugular central venous catheter is in


place. There is no pneumothorax or pleural effusion. There are old left rib


fractures. There is no consolidation or evidence for pulmonary edema.


Cardiomediastinal silhouette is unremarkable. 





IMPRESSION:  No acute cardiopulmonary findings. 





Electronically signed by:  Robert Traore M.D.


3/26/2018 12:33 PM





Dictated Date/Time:  3/26/2018 12:33 PM





Laboratory Results


3/26/18 12:32








Red Blood Count 4.27, Mean Corpuscular Volume 99.1, Mean Corpuscular Hemoglobin 

31.6, Mean Corpuscular Hemoglobin Concent 31.9, Mean Platelet Volume 10.0, 

Neutrophils (%) (Auto) 77.7, Lymphocytes (%) (Auto) 14.7, Monocytes (%) (Auto) 

6.0, Eosinophils (%) (Auto) 0.8, Basophils (%) (Auto) 0.2, Neutrophils # (Auto) 

8.14, Lymphocytes # (Auto) 1.54, Monocytes # (Auto) 0.63, Eosinophils # (Auto) 

0.08, Basophils # (Auto) 0.02





3/26/18 12:32

















Test


  3/26/18


12:32 3/26/18


12:35


 


White Blood Count


  10.47 K/uL


(4.8-10.8) 


 


 


Red Blood Count


  4.27 M/uL


(4.7-6.1) 


 


 


Hemoglobin


  13.5 g/dL


(14.0-18.0) 


 


 


Hematocrit 42.3 % (42-52)  


 


Mean Corpuscular Volume


  99.1 fL


() 


 


 


Mean Corpuscular Hemoglobin


  31.6 pg


(25-34) 


 


 


Mean Corpuscular Hemoglobin


Concent 31.9 g/dl


(32-36) 


 


 


Platelet Count


  67 K/uL


(130-400) 


 


 


Mean Platelet Volume


  10.0 fL


(7.4-10.4) 


 


 


Neutrophils (%) (Auto) 77.7 %  


 


Lymphocytes (%) (Auto) 14.7 %  


 


Monocytes (%) (Auto) 6.0 %  


 


Eosinophils (%) (Auto) 0.8 %  


 


Basophils (%) (Auto) 0.2 %  


 


Neutrophils # (Auto)


  8.14 K/uL


(1.4-6.5) 


 


 


Lymphocytes # (Auto)


  1.54 K/uL


(1.2-3.4) 


 


 


Monocytes # (Auto)


  0.63 K/uL


(0.11-0.59) 


 


 


Eosinophils # (Auto)


  0.08 K/uL


(0-0.5) 


 


 


Basophils # (Auto)


  0.02 K/uL


(0-0.2) 


 


 


RDW Standard Deviation


  48.1 fL


(36.4-46.3) 


 


 


RDW Coefficient of Variation


  13.4 %


(11.5-14.5) 


 


 


Immature Granulocyte % (Auto) 0.6 %  


 


Immature Granulocyte # (Auto)


  0.06 K/uL


(0.00-0.02) 


 


 


Platelet Estimate DECREASED  


 


Prothrombin Time


  12.0 SECONDS


(9.0-12.0) 


 


 


Prothromb Time International


Ratio 1.1 (0.9-1.1) 


  


 


 


Activated Partial


Thromboplast Time 30.0 SECONDS


(21.0-31.0) 


 


 


Partial Thromboplastin Ratio 1.2  


 


Anion Gap


  10.0 mmol/L


(3-11) 


 


 


Est Creatinine Clear Calc


Drug Dose 9.5 ml/min 


  


 


 


Estimated GFR (


American) 8.7 


  


 


 


Estimated GFR (Non-


American 7.5 


  


 


 


BUN/Creatinine Ratio 8.5 (10-20)  


 


Calcium Level


  7.5 mg/dl


(8.5-10.1) 


 


 


Total Bilirubin


  0.5 mg/dl


(0.2-1) 


 


 


Aspartate Amino Transf


(AST/SGOT) 19 U/L (15-37) 


  


 


 


Alanine Aminotransferase


(ALT/SGPT) 28 U/L (12-78) 


  


 


 


Alkaline Phosphatase


  125 U/L


() 


 


 


Total Protein


  6.8 gm/dl


(6.4-8.2) 


 


 


Albumin


  2.5 gm/dl


(3.4-5.0) 


 


 


Globulin


  4.3 gm/dl


(2.5-4.0) 


 


 


Albumin/Globulin Ratio 0.6 (0.9-2)  


 


Bedside Lactic Acid Venous


  


  0.71 mmol/L


(0.90-1.70)





Labs reviewed by ED physician.





Medications Administered











 Medications


  (Trade)  Dose


 Ordered  Sig/Basilia


 Route  Start Time


 Stop Time Status Last Admin


Dose Admin


 


 Vancomycin HCl


  (Vancomycin 1gm/


 270ml Nss)  1 gm  NOW  STAT


 IV  3/26/18 13:48


 3/26/18 13:49 DC 3/26/18 13:57


1 GM











ECG Per My Interpretation


Indication:  weakness


Rate (beats per minute):  83


Rhythm:  other (accelerated junctional rhythm)


Findings:  prolonged QT, other (no ST elevation or depression)





ED Course


1214: Past medical records reviewed. The patient was evaluated in room A10. A 

complete history and physical examination was performed. 





1345: I discussed the patient's case with Tracee Saha internal 

medicine for Dr. Arzola. She is in agreement with the plan. 





1348: Vancomycin HCl 1 gm IV. 





1349: Upon reexamination the patient is stable. I discussed results and 

treatment plan with the patient. He verbalizes agreement and understanding. The 

patient will be evaluated for further management. 





1352: I discussed the patient's case with Jie Love, CRNP [facility] 

hospitalist, she has agreed to evaluate the patient for further management and 

care.





Medical Decision


Differential diagnosis:


Etiologies such as metabolic, infection, hypo/hyperglycemia, electrolyte 

abnormalities, cardiac sources, intracerebral event, toxicologic, neurologic, 

as well as others were entertained. 





This is a 63-year-old male who presents the emergency department over concerns 

the patient blew grew out strep out of his blood cultures.  The patient has no 

other complaints.  He does not have an elevation in his white blood cell count.

  He has not had his dialysis today.  I did discuss his case with his 

nephrologist who asked that the patient be admitted.  I did discuss the case 

with the patient's hospitalist group who also admitted the patient.  Patient 

was in agreement with the treatment plan.





Medication Reconcilliation


Current Medication List:  was personally reviewed by me





Blood Pressure Screening


Patient's blood pressure:  Normal blood pressure


Blood pressure disposition:  Did not require urgent referral





Consults


Time Called:  1319


Consulting Physician[facility]  [facility]


Returned Call:  1345


I discussed the patient's case with her for Dr. Arzola. She is in agreement with 

the plan.


Additional Consults:  


   Time Called:  1350


   Consulted Physician:  IRIS Ramos hospitalist


   Returned Call:  1352


Additional Comments:


I discussed the patient's case with her, she has agreed to evaluate the patient 

for further management and care.





Impression





 Primary Impression:  


 Weakness





Scribe Attestation


The scribe's documentation has been prepared under my direction and personally 

reviewed by me in its entirety. I confirm that the note above accurately 

reflects all work, treatment, procedures, and medical decision making performed 

by me.





Departure Information


Dispostion


Being Evaluated By Hospitalist





Referrals


Gonzalo Rothman D.O. (PCP)





Patient Instructions


My [facility]

## 2018-03-26 NOTE — DIAGNOSTIC IMAGING REPORT
CHEST ONE VIEW PORTABLE



CLINICAL HISTORY: Sepsis    



COMPARISON STUDY:  No previous studies for comparison.



FINDINGS: A dual lumen right internal jugular central venous catheter is in

place. There is no pneumothorax or pleural effusion. There are old left rib

fractures. There is no consolidation or evidence for pulmonary edema.

Cardiomediastinal silhouette is unremarkable. 



IMPRESSION:  No acute cardiopulmonary findings. 









Electronically signed by:  Robert Traore M.D.

3/26/2018 12:33 PM



Dictated Date/Time:  3/26/2018 12:33 PM

## 2018-03-27 VITALS
HEART RATE: 65 BPM | OXYGEN SATURATION: 95 % | SYSTOLIC BLOOD PRESSURE: 110 MMHG | TEMPERATURE: 97.34 F | DIASTOLIC BLOOD PRESSURE: 69 MMHG

## 2018-03-27 VITALS
OXYGEN SATURATION: 93 % | TEMPERATURE: 97.7 F | HEART RATE: 64 BPM | DIASTOLIC BLOOD PRESSURE: 68 MMHG | SYSTOLIC BLOOD PRESSURE: 110 MMHG

## 2018-03-27 VITALS
TEMPERATURE: 97.34 F | SYSTOLIC BLOOD PRESSURE: 104 MMHG | HEART RATE: 65 BPM | DIASTOLIC BLOOD PRESSURE: 66 MMHG | OXYGEN SATURATION: 93 %

## 2018-03-27 VITALS
TEMPERATURE: 97.7 F | DIASTOLIC BLOOD PRESSURE: 72 MMHG | HEART RATE: 64 BPM | SYSTOLIC BLOOD PRESSURE: 112 MMHG | OXYGEN SATURATION: 95 %

## 2018-03-27 VITALS
DIASTOLIC BLOOD PRESSURE: 63 MMHG | TEMPERATURE: 97.88 F | OXYGEN SATURATION: 94 % | SYSTOLIC BLOOD PRESSURE: 104 MMHG | HEART RATE: 63 BPM

## 2018-03-27 VITALS
OXYGEN SATURATION: 95 % | HEART RATE: 60 BPM | DIASTOLIC BLOOD PRESSURE: 52 MMHG | TEMPERATURE: 97.34 F | SYSTOLIC BLOOD PRESSURE: 91 MMHG

## 2018-03-27 VITALS
TEMPERATURE: 97.34 F | HEART RATE: 65 BPM | OXYGEN SATURATION: 92 % | DIASTOLIC BLOOD PRESSURE: 71 MMHG | SYSTOLIC BLOOD PRESSURE: 111 MMHG

## 2018-03-27 VITALS
DIASTOLIC BLOOD PRESSURE: 74 MMHG | OXYGEN SATURATION: 93 % | TEMPERATURE: 98.06 F | SYSTOLIC BLOOD PRESSURE: 125 MMHG | HEART RATE: 70 BPM

## 2018-03-27 VITALS
DIASTOLIC BLOOD PRESSURE: 62 MMHG | TEMPERATURE: 98.42 F | OXYGEN SATURATION: 92 % | SYSTOLIC BLOOD PRESSURE: 108 MMHG | HEART RATE: 68 BPM

## 2018-03-27 VITALS
DIASTOLIC BLOOD PRESSURE: 69 MMHG | SYSTOLIC BLOOD PRESSURE: 109 MMHG | HEART RATE: 71 BPM | OXYGEN SATURATION: 93 % | TEMPERATURE: 97.52 F

## 2018-03-27 VITALS
DIASTOLIC BLOOD PRESSURE: 73 MMHG | HEART RATE: 73 BPM | TEMPERATURE: 97.88 F | SYSTOLIC BLOOD PRESSURE: 110 MMHG | OXYGEN SATURATION: 93 %

## 2018-03-27 VITALS
SYSTOLIC BLOOD PRESSURE: 102 MMHG | DIASTOLIC BLOOD PRESSURE: 68 MMHG | HEART RATE: 61 BPM | OXYGEN SATURATION: 91 % | TEMPERATURE: 98.06 F

## 2018-03-27 VITALS — OXYGEN SATURATION: 95 %

## 2018-03-27 LAB
BUN SERPL-MCNC: 63 MG/DL (ref 7–18)
CALCIUM SERPL-MCNC: 7.3 MG/DL (ref 8.5–10.1)
CO2 SERPL-SCNC: 23 MMOL/L (ref 21–32)
CREAT SERPL-MCNC: 7.09 MG/DL (ref 0.6–1.4)
EOSINOPHIL NFR BLD AUTO: 70 K/UL (ref 130–400)
GLUCOSE SERPL-MCNC: 54 MG/DL (ref 70–99)
HCT VFR BLD CALC: 37.1 % (ref 42–52)
HGB BLD-MCNC: 11.9 G/DL (ref 14–18)
MCH RBC QN AUTO: 31.4 PG (ref 25–34)
MCHC RBC AUTO-ENTMCNC: 32.1 G/DL (ref 32–36)
MCV RBC AUTO: 97.9 FL (ref 80–100)
PMV BLD AUTO: 9.5 FL (ref 7.4–10.4)
POTASSIUM SERPL-SCNC: 4.4 MMOL/L (ref 3.5–5.1)
RED CELL DISTRIBUTION WIDTH CV: 13.5 % (ref 11.5–14.5)
RED CELL DISTRIBUTION WIDTH SD: 48.6 FL (ref 36.4–46.3)
SODIUM SERPL-SCNC: 140 MMOL/L (ref 136–145)
WBC # BLD AUTO: 8 K/UL (ref 4.8–10.8)

## 2018-03-27 PROCEDURE — 0JPT3XZ REMOVAL OF TUNNELED VASCULAR ACCESS DEVICE FROM TRUNK SUBCUTANEOUS TISSUE AND FASCIA, PERCUTANEOUS APPROACH: ICD-10-PCS | Performed by: SURGERY

## 2018-03-27 PROCEDURE — 02PAX3Z REMOVAL OF INFUSION DEVICE FROM HEART, EXTERNAL APPROACH: ICD-10-PCS | Performed by: SURGERY

## 2018-03-27 RX ADMIN — CALCIUM ACETATE SCH MG: 667 CAPSULE ORAL at 08:44

## 2018-03-27 RX ADMIN — FERROUS SULFATE TAB EC 325 MG (65 MG FE EQUIVALENT) SCH MG: 325 (65 FE) TABLET DELAYED RESPONSE at 17:27

## 2018-03-27 RX ADMIN — CALCIUM ACETATE SCH MG: 667 CAPSULE ORAL at 13:39

## 2018-03-27 RX ADMIN — Medication SCH INTER.UNIT: at 13:38

## 2018-03-27 RX ADMIN — TACROLIMUS SCH MG: 1 CAPSULE ORAL at 22:07

## 2018-03-27 RX ADMIN — HEPARIN SODIUM SCH UNIT: 10000 INJECTION, SOLUTION INTRAVENOUS; SUBCUTANEOUS at 13:44

## 2018-03-27 RX ADMIN — PRAVASTATIN SODIUM SCH MG: 20 TABLET ORAL at 13:38

## 2018-03-27 RX ADMIN — FERROUS SULFATE TAB EC 325 MG (65 MG FE EQUIVALENT) SCH MG: 325 (65 FE) TABLET DELAYED RESPONSE at 13:39

## 2018-03-27 RX ADMIN — INSULIN ASPART SCH UNITS: 100 INJECTION, SOLUTION INTRAVENOUS; SUBCUTANEOUS at 08:41

## 2018-03-27 RX ADMIN — HEPARIN SODIUM SCH UNIT: 10000 INJECTION, SOLUTION INTRAVENOUS; SUBCUTANEOUS at 22:06

## 2018-03-27 RX ADMIN — INSULIN ASPART SCH UNITS: 100 INJECTION, SOLUTION INTRAVENOUS; SUBCUTANEOUS at 22:06

## 2018-03-27 RX ADMIN — ALLOPURINOL SCH MG: 100 TABLET ORAL at 13:37

## 2018-03-27 RX ADMIN — CLOPIDOGREL BISULFATE SCH MG: 75 TABLET, FILM COATED ORAL at 13:40

## 2018-03-27 RX ADMIN — AMITRIPTYLINE HYDROCHLORIDE SCH MG: 50 TABLET, FILM COATED ORAL at 22:07

## 2018-03-27 RX ADMIN — INSULIN ASPART SCH UNITS: 100 INJECTION, SOLUTION INTRAVENOUS; SUBCUTANEOUS at 12:51

## 2018-03-27 RX ADMIN — FERROUS SULFATE TAB EC 325 MG (65 MG FE EQUIVALENT) SCH MG: 325 (65 FE) TABLET DELAYED RESPONSE at 08:44

## 2018-03-27 RX ADMIN — TACROLIMUS SCH MG: 1 CAPSULE ORAL at 13:39

## 2018-03-27 RX ADMIN — CALCIUM ACETATE SCH MG: 667 CAPSULE ORAL at 17:27

## 2018-03-27 RX ADMIN — INSULIN ASPART SCH UNITS: 100 INJECTION, SOLUTION INTRAVENOUS; SUBCUTANEOUS at 17:29

## 2018-03-27 RX ADMIN — PANTOPRAZOLE SCH MG: 40 TABLET, DELAYED RELEASE ORAL at 13:40

## 2018-03-27 RX ADMIN — HEPARIN SODIUM SCH UNIT: 10000 INJECTION, SOLUTION INTRAVENOUS; SUBCUTANEOUS at 06:01

## 2018-03-27 NOTE — PHARMACY PROGRESS NOTE
Pharmacy Abx Initial Consult


Date of Service


Mar 27, 2018.





Pharmacy Dosing Scope


Date of Consult:  3/26/18


Consultation requested by: Jie SIMMONS





Pharmacy is consulted to initiate IV Vancomycin dosing therapy, order 

appropriate labs and adjust drug dose/frequency.





Subjective


The patient is a 63 year old male admitted on Mar 26, 2018 at 14:33 with blood 

infection.  He is a dialysis patient (M-W-F) Blood cultures both resulted 

positive with GPC.  It was/is suspected that the source is his dialysis 

catheter.  He was taken to the OR to replace this today and will then re-start 

his dialysis tomorrow.  In the meantime pharmacy was consulted to dose 

Vancomycin going forward.





Objective


Height (Feet):  5


Height (Inches):  7.00


Weight (Kilograms):  64.700


Vital Signs (Past 12Hrs)





Vital Signs Past 12 Hours








  Date Time  Temp Pulse Resp B/P (MAP) Pulse Ox O2 Delivery O2 Flow Rate FiO2


 


3/27/18 15:59 36.3 65 18 111/71 (84) 92 Room Air  


 


3/27/18 14:00 36.4 71 18 109/69 (82) 93 Room Air  


 


3/27/18 12:00      Room Air  


 


3/27/18 12:00 36.6 73 18 110/73 (85) 93 Room Air  


 


3/27/18 11:45 36.6 63 18 104/63 (77) 94 Room Air  


 


3/27/18 11:30 36.5 64 18 112/72 (85) 95 Room Air  


 


3/27/18 11:15 36.3 65 16 110/69 (83) 95 Room Air  


 


3/27/18 10:53  64 18  98 Room Air  


 


3/27/18 10:48  64 18  98 Room Air  


 


3/27/18 10:43  64 18 101/72 98 Room Air  


 


3/27/18 09:24 36.5 64 18 110/68 (82) 93 Room Air  


 


3/27/18 08:04 36.3 65 16 104/66 (79) 93 Room Air  


 


3/27/18 08:00     95 Room Air  








Lab Results (24Hrs)





Laboratory Tests (24 Hours)








Test


  3/27/18


05:55


 


White Blood Count


  8.00 K/uL


(4.8-10.8)








Micro Results











 Date/Time


Source Procedure


Growth Status


 


 


 3/26/18 13:52


Blood Fungal Smear - Final Resulted


 


 3/26/18 13:52


Blood Fungal Culture


Pending Resulted


 


 3/26/18 12:45


Blood Blood Culture - Preliminary


Gram Positive Cocci Resulted


 


 3/26/18 12:32


Blood Blood Culture - Preliminary


Gram Positive Cocci Resulted


 


 3/26/18 20:20


Nasal MRSA DNA Surveillance Screen - Final


Specimen Negative for MRSA by DNA Probe Complete


 


 3/27/18 15:06


Catheter Tip Perm. Catheter Catheter Tip Culture


Pending Ordered


 


 3/27/18 10:50


Catheter Tip Perm. Catheter Catheter Tip Culture


Pending Received











Risk Factors for Resistance


* Chronic dialysis within the past 30 days





Assessment & Plan


Assessment








63 year old male with confirmed bacteremia 





Plan





Vancomycin IV 


* Loading dose: 1000mg  (16 mg/kg)


* Maintenance dose:  dosed supplementally per levels


* Goal trough level 15-20 mcg/mL


* after dialysis was cancelled for 3/27/18 due to OR replacement of dialysis 

catheter, I gave him a supplemental 500mg dose at around 1500 today.  His 

random with am labs had returned at 15mcg/ml.





Pharmacy will continue to follow and will adjust dose/frequency as necessary.  

Thank you.

## 2018-03-27 NOTE — MEDICAL CONSULT
Consultation Note


Date of Service


Mar 27, 2018.


 (Xiomara Vaughn, FREEDOM)





Consultation Note


Chief Complaint


bacteremia, ESRD





History of Present Illness


The patient is a 62 YO M with multiple medical problems, including ESRD on HD, 

admitted for bacteremia, seen in consultation today for removal of TDC d/t 

likely source of infection.  Pt states he was told by HD unit that he had 

bacteria in his blood and to come to [facility] for eval.  Denies fever, chills, 

chest pain, SOB, abd pain, N/V, rest pain, claudication, other complaints. Had 

R wrist AVF created at [facility] approx 2 months ago per pt.  States he was to 

see them today at [facility] to determine whether his AVF was ready to use for 

HD.  Blood cx + for gram positive cocci    














         


 


        


 


        


 


        


 


        


 


        


 


        











Allergies


Coded Allergies:  


     No Known Allergies (Unverified , 12/20/17)





Home Medications


Scheduled


Allopurinol (Allopurinol), 1 TAB PO DAILY


Amitriptyline Hcl (Amitriptyline Hcl), 1 TAB PO HS


Ascorbic Acid (Ascorbic Acid), 500 MG PO TIDM


Carvedilol (Carvedilol), 1 TAB PO BID


Cholecalciferol (D3 2000), 1 TAB PO DAILY


Clopidogrel (Plavix), 75 MG PO DAILY


Entecavir (Entecavir), 1 TAB PO DAILY


Ferrous Sulfate (Ferrous Sulfate), 1 TAB PO TIDM


Insulin Aspart (Novolog Flexpen), 10 UNITS PO with lunch


Insulin Aspart (Novolog), 12 PO with dinner


Insulin Aspart (Novolog Flexpen), 8 UNITS SUBD with breakfast


Insulin Glargine (Lantus), 30 SC QPM


Pantoprazole (Protonix), 40 MG PO DAILY


Pravastatin (Pravachol ), 20 MG PO DAILY


Sodium Bicarbonate (Antacid) (Sodium Bicarbonate), 1 TAB PO TID


Tacrolimus (Prograf), 4 CAP PO BID





Problem List


Medical Problems:


(1) Cerebral palsy


(2) Diabetes mellitus type II, uncontrolled


(3) End stage chronic kidney disease


(4) Hepatitis B


(5) Presence of stent in coronary artery in patient with coronary artery disease


Surgical Problems:


(1) S/P liver transplant








Surgical / Medical History


Past Medical/Surgical History:  Diabetes, Kidney Disease





Social History


Smoking Status:  Former Smoker (Quit in 1990s)





 ROS: Vascular Con v2 


Review of Systems


Constitutional:  No chills, No diaphoresis, No fever, No malaise, No weakness, 

No weight gain, No weight loss, No sweats, No fatigue, No problem reported


Respiratory:  No cough, No cyanosis, No MCCARTHY, No hemoptysis, No orthopnea, No PND

, No short of breath, No sputum production, No stridor, No wheezing, No dyspnea

, No problem reported


Cardiovascular:  No chest pain, No chest tightness, No chest pressure, No 

palpitations, No syncope, No diaphoresis, No edema, No intermittent claudication

, No orthopnea, No cyanosis, No mumur, No lightheadedness, No paroxysmal 

nocturnal dyspnea, No problem reported


Gastrointestinal:  No abdominal pain, No constipation, No diarrhea, No nausea, 

No vomiting, No anorexia, No appetite changes, No belching, No flatulence, No 

food intolerance, No hematemesis, No hemorrhoids, No hematochezia, No stool 

changes, No heartburn, No indigestion, No dysphagia, No rectal bleeding, No 

problem reported


Neurologic:  No dizziness, No weakness, No headache, No lethargy, No numbness, 

No paresthesia, No pre-existing deficit, No seizures, No tics, No tingling, No 

tremors, No vertigo, No memory loss, No LOC, No problem reported





 Exam: Vascular Con v2 


Physical Exam


Constitutional:  


   General Apperance:  mildly chronically ill appearing male. well-nourished, 

well-developed


   Level of Distress:  NAD


   Ambulation:  ambulating normally


Psychiatric:  


   Mental Status:  active & alert, normal mood, normal affect


   Orientation:  oriented except where noted


   Memory:  recent memory normal, remote memory normal


Head:  normocephalic


Lungs:  


   Auscultation:  breath sounds normal, CTA except as noted, no wheezing, no 

rales/crackles, no rhonchi


Cardiovascular:  


   Heart Auscultation:  RRR


CHEST: permcath site nontender.  No erythema or edema or discharge noted.


Peripheral Pulses:  


   Radial Pulse:  normal on the left, normal on the right.  R wrist AVF with 

excellent, uniform thrill/bruit extending to shoulder


   Femoral Pulse:  normal on the left, normal on the right


Abdomen:  


   Inspection & Palpation:  soft


Musculoskeletal:  normal


Extremities:  


   Upper Right:  no cyanosis, no edema, no varicosities, no palpable cord, no 

clubbing, no ulcers, no mottling


   Upper Left:  no cyanosis, no edema, no palpable cord, no clubbing, no ulcers

, no mottling


   Lower Right:  no cyanosis, no edema, no varicosities, no palpable cord, no 

clubbing, no ulcers, no mottling


   Lower Left:  no cyanosis, no edema, no varicosities, no palpable cord, no 

clubbing, no ulcers, no mottling





 A&P: Vascular Con v2 


Assessment and Plan


Imp:


   bacteremia


   ESRD on HD


   Matured RUE AVF


   





Plan:


   Recommend removal of permcath d/t likely source of infection.  May use AVF 

for HD.  If unable to use AVF, please call and we can place temporary catheter 

if needed. 








 (Xiomara Vaughn, PA-C)


Patient was seen, examined, and chart reviewed.  Agree with exam and treatment 

plan of the Vascular PA.


Patient for removal of permcath.  I have discussed the risks options and 

benefits of the procedure with the patient.  The patient understands the risks 

options and benefits and agrees to the procedure.


 (Christ Waite M.D.)

## 2018-03-27 NOTE — PROGRESS NOTE
Subjective


Date of Service:


Mar 27, 2018.


Subjective


Pt evaluation today including:  conversation w/ patient, physical exam, lab 

review, review of studies, review of inpatient medication list


Saw/examined the patient in room 277


perm cath removed earlier


He is doing well


No problems/issues to note at this time





Review of Systems


Constitutional:  No fever, No chills


Respiratory:  No shortness of breath


Cardiac:  No chest pain


Abdomen:  No pain, No nausea, No vomiting, No diarrhea





Medications





Current Inpatient Medications








 Medications


  (Trade)  Dose


 Ordered  Sig/Basilia


 Route  Start Time


 Stop Time Status Last Admin


Dose Admin


 


 Heparin Sodium


  (Porcine)


  (Heparin Sq 5000


 Unit/0.5ml)  5,000 unit  Q8


 SQ  3/26/18 22:00


 4/25/18 21:59  3/27/18 06:01


5,000 UNIT


 


 Acetaminophen


  (Tylenol Tab)  650 mg  Q4H  PRN


 PO  3/26/18 14:30


 4/25/18 14:29   


 


 


 Insulin Aspart


  (novoLOG ASPART)  **SLIDING


 SCALE**


 **If C...  TID@1100,1630,2100


 SC  3/26/18 16:30


 4/25/18 16:29  3/27/18 17:29


4 UNITS


 


 Glucose


  (Glucose 40% Gel)  15-30


 GRAMS 15


 GRAMS...  UD  PRN


 PO  3/26/18 15:15


 4/25/18 15:14   


 


 


 Glucose


  (Glucose Chew


 Tab)  4-8


 Tablets 4


 Tabl...  UD  PRN


 PO  3/26/18 15:15


 4/25/18 15:14  3/26/18 17:31


4 TABS


 


 Dextrose


  (Dextrose 50%


 50ML Syringe)  25-50ML OF


 50% DW IV


 FOR...  UD  PRN


 IV  3/26/18 15:15


 4/25/18 15:14  3/27/18 07:57


50 ML


 


 Glucagon


  (Glucagon Inj)  1 mg  UD  PRN


 SQ  3/26/18 15:15


 4/25/18 15:14   


 


 


 Miscellaneous


 Information


  (Consult


 Glycemic


 Management


 Pharmacy)  1 ea  UD  PRN


 N/A  3/26/18 15:22


 4/25/18 15:21   


 


 


 Miscellaneous


 Information


  (Consult)  1 ea  UD  PRN


 N/A  3/26/18 15:30


 4/25/18 15:29   


 


 


 Allopurinol


  (Zyloprim Tab)  100 mg  DAILY


 PO  3/27/18 09:00


 4/26/18 08:59  3/27/18 13:37


100 MG


 


 Calcium Acetate


  (Phoslo Cap)  667 mg  TIDM


 PO  3/26/18 17:00


 4/25/18 17:59  3/27/18 17:27


667 MG


 


 Clopidogrel


 Bisulfate


  (plAVix TAB)  75 mg  DAILY


 PO  3/27/18 09:00


 4/26/18 08:59  3/27/18 13:40


75 MG


 


 Ferrous Sulfate


  (Feosol Tab)  325 mg  TIDM


 PO  3/26/18 17:00


 4/25/18 17:59  3/27/18 17:27


325 MG


 


 Pantoprazole


 Sodium


  (Protonix Tab)  40 mg  DAILY


 PO  3/27/18 09:00


 4/26/18 08:59  3/27/18 13:40


40 MG


 


 Pravastatin Sodium


  (Pravachol Tab)  20 mg  DAILY


 PO  3/27/18 09:00


 4/26/18 08:59  3/27/18 13:38


20 MG


 


 Tacrolimus


  (Prograf Cap)  4 mg  BID


 PO  3/26/18 21:00


 4/25/18 20:59  3/27/18 13:39


4 MG


 


 Amitriptyline HCl


  (Elavil Tab)  150 mg  HS


 PO  3/26/18 21:00


 4/25/18 20:59  3/26/18 21:28


150 MG


 


 Cholecalciferol


  (Vitamin D Tab)  2,000


 inter.unit  DAILY


 PO  3/27/18 09:00


 4/26/18 08:59  3/27/18 13:38


2,000 INTER.UNIT


 


 Insulin Aspart


  (novoLOG ASPART)  **SLIDING


 SCALE**


 **If C...  QDB


 SC  3/27/18 08:00


 4/26/18 07:59   


 


 


 Non-Formulary


 Medication


  (Non-Formulary


 Patient'S Own Med)  1 ea  DAILY


 PO  3/27/18 09:00


 4/26/18 08:59  3/27/18 12:48


1 EA


 


 Insulin Glargine


  (Lantus Solostar


 Pen)  SEE


 PROTOCOL  HS


 SC  3/27/18 21:00


 4/26/18 20:59   


 











Objective


Vital Signs











  Date Time  Temp Pulse Resp B/P (MAP) Pulse Ox O2 Delivery O2 Flow Rate FiO2


 


3/27/18 15:59 36.3 65 18 111/71 (84) 92 Room Air  


 


3/27/18 14:00 36.4 71 18 109/69 (82) 93 Room Air  


 


3/27/18 12:00      Room Air  


 


3/27/18 12:00 36.6 73 18 110/73 (85) 93 Room Air  


 


3/27/18 11:45 36.6 63 18 104/63 (77) 94 Room Air  


 


3/27/18 11:30 36.5 64 18 112/72 (85) 95 Room Air  


 


3/27/18 11:15 36.3 65 16 110/69 (83) 95 Room Air  


 


3/27/18 10:53  64 18  98 Room Air  


 


3/27/18 10:48  64 18  98 Room Air  


 


3/27/18 10:43  64 18 101/72 98 Room Air  


 


3/27/18 09:24 36.5 64 18 110/68 (82) 93 Room Air  


 


3/27/18 08:04 36.3 65 16 104/66 (79) 93 Room Air  


 


3/27/18 08:00     95 Room Air  


 


3/27/18 04:00      Room Air  


 


3/27/18 04:00 36.3 60 18 91/52 (65) 95 Room Air  


 


3/27/18 00:00 36.7 61 20 102/68 (79) 91 Room Air  


 


3/27/18 00:00      Room Air  


 


3/26/18 20:00      Room Air  


 


3/26/18 19:55 36.6 68 16 110/68 (82) 94 Room Air  


 


3/26/18 19:00     95 Room Air  


 


3/26/18 19:00 36.7 70 20 115/72    











Physical Exam


General Appearance:  no apparent distress


Respiratory/Chest:  lungs clear, normal breath sounds, no respiratory distress, 

no accessory muscle use


Cardiovascular:  regular rate, rhythm, no edema, no murmur


Abdomen:  normal bowel sounds, non tender, soft





Laboratory Results





Last 24 Hours








Test


  3/26/18


20:28 3/26/18


23:30 3/27/18


05:55 3/27/18


07:47


 


Bedside Glucose 244 mg/dl    51 mg/dl 


 


Troponin I  < 0.015 ng/ml  < 0.015 ng/ml  


 


White Blood Count   8.00 K/uL  


 


Red Blood Count   3.79 M/uL  


 


Hemoglobin   11.9 g/dL  


 


Hematocrit   37.1 %  


 


Mean Corpuscular Volume   97.9 fL  


 


Mean Corpuscular Hemoglobin   31.4 pg  


 


Mean Corpuscular Hemoglobin


Concent 


  


  32.1 g/dl 


  


 


 


RDW Standard Deviation   48.6 fL  


 


RDW Coefficient of Variation   13.5 %  


 


Platelet Count   70 K/uL  


 


Mean Platelet Volume   9.5 fL  


 


Sodium Level   140 mmol/L  


 


Potassium Level   4.4 mmol/L  


 


Chloride Level   105 mmol/L  


 


Carbon Dioxide Level   23 mmol/L  


 


Anion Gap   12.0 mmol/L  


 


Blood Urea Nitrogen   63 mg/dl  


 


Creatinine   7.09 mg/dl  


 


Est Creatinine Clear Calc


Drug Dose 


  


  9.6 ml/min 


  


 


 


Estimated GFR (


American) 


  


  8.6 


  


 


 


Estimated GFR (Non-


American 


  


  7.4 


  


 


 


BUN/Creatinine Ratio   8.9  


 


Random Glucose   54 mg/dl  


 


Calcium Level   7.3 mg/dl  


 


Random Vancomycin Level   15.0 mcg/ml  


 


Test


  3/27/18


08:13 3/27/18


09:37 3/27/18


11:49 3/27/18


16:30


 


Bedside Glucose 122 mg/dl  121 mg/dl  82 mg/dl  137 mg/dl 


 


Troponin I   < 0.015 ng/ml  


 


Test


  3/27/18


17:48 


  


  


 











Assessment and Plan


This is a 63 year old male with a recent initiation of dialysis, AV fistula on 

R arm, not yet fully matured; insulin dependent DM, CAD, cerebral palsy





Gram Positive Bacteremia


- patient admitted due to outpatient blood cultures being positive


- given IV Vanco


- tunneled dialysis catheter removed


- blood cultures here are also positive x2


- echo with no evidence of endocarditis


- further input as per ID





ESRD on HD


- appreciate vascular surgery input


- catheter removed, tip cultured


- can use mature AV fistula


- dialysis as per nephrology





Hx. of CAD


- troponins negative x3


- unlikely acute issue


- continue home medications





Uncontrolled DM2


- continue insulin


- appreciate pharmacy glycemic control





DVT ppx


- subq heparin





FULL CODE

## 2018-03-27 NOTE — PHARMACY PROGRESS NOTE
Pharmacy Glycemic Short Note 2


Date of Service


Mar 27, 2018.





OUTPATIENT ANTIDIABETIC REGIMEN: 


* Lantus 35 units in the evening plus Novolog 12 with breakfast, 14 with lunch, 

and 16 with dinner











ASSESSMENT:


* Mr Lara is a 64 y/o M with a PMH of liver transplant, hepatitis B, 

hemodialysis MWF, and unknown control of type 2 diabetes (HbA1C is not reliable 

in this patient as he undergoes hemodialysis which can effect the results). He 

currently has 2 of 2 blood cultures positive for Gram positive cocci.


* During a previous hospitalization in December of last year, the patient 

consistently required 10 units of Lantus BID plus around 20-27 units of 

correctional insulin. In order to create an easier transition to outpatient 

therapy, started Lantus 20 units last night with similar Novolog dosing. The 

patient was 53 before dinner then 244 mg/dL at bedtime. Unfortunately, the 

patient's blood sugar was 54 mg/dL this morning. He is NPO for removal of 

catheter.


* Reduced Lantus to 15 units tonight (25% reduction) secondary to low blood 

sugar this morning with the caveat that a higher blood sugar could give her 18 

units. This low blood sugar was not due to the patient's NPO status (NPO 

started after low blood sugar) and was odd considering that the patient 

stabilized on 10 units BID during his previous hospitalization. Loosened 

Novolog for today secondary to NPO status.











PLAN FOR INPATIENT GLYCEMIC CONTROL:





* Basal insulin


 * Lantus 15 units SQ qHS (18 units if blood sugar greater than 180 mg/dL)





* Bolus insulin


 * NovoLog per scale ACHS or Q6hrs while NPO


 * Goal Range:  Low 110 mg/dL - High 140 mg/dL


 * Correction Factor:  35 mg/dL/unit


 * Nutritional / Prandial insulin per carb ratio of  1 unit per 15 grams CHO 

consumed








PLAN FOR DISCHARGE:


* The patient is a dialysis patient and therefore HbA1C is not an adequate 

indicator of glycemic control. Recommend checking blood sugars and making a log 

for outpatient adjustment of insulin.

## 2018-03-27 NOTE — ECHOCARDIOGRAM REPORT
*NOTICE TO RECEIVING PARTY AGENCY**  This information is strictly Confidential and protected under 
[place] law.  [place] law prohibits you from making any further disclosure of this 
information unless further disclosure is expressly permitted by the written consent of the person to 
whom it pertains or is authorized by law.  A general authorization for the release of medical or 
other information is not sufficient for this purpose.  Hospital accepts no responsibility if the 
information is made available to any other person, INCLUDING THE PATIENT.



Interpretation Summary

  *  Name: KAREY GARCIA  Study Date: 2018 07:04 AM  BP: 110/68 mmHg

  *  MRN: [id number]  Patient Location: [facility]\S\N277\S\2  HR: 67

  *  : 1955 (M/d/yyyy)  Gender: Male  Height: 68 in

  *  Age: 63 yrs  Ethnicity: [place]  Weight: 138 lb

  *  Ordering Physician: Jie Lindquist

  *  Referring Physician: Cristiano Arzola

  *  Performed By: Nae Rajan RCS

  *  Accession# [id number]  Account# [id number]

  *  Reason For Study: ENDOCARDITIS

  *  BSA: 1.7 m2

  *  -- Conclusions --

  *  Normal LV chamber size and wall thickness.

  *  Normal LV systolic function, EF 55-60%.

  *  No segmental left ventricular wall motion abnormalities are noted.

  *  Mild aortic valve sclerosis without stenosis.

  *  Focal thickening with slight calcification of the anterior mitral valve leaflet. Cannot exclude 
healed vegetation.

  *  Mild left atrial enlargement.

  *  No valvular lesions diagnostic for endocarditis identified within the scope of this imaging 
modality.

Procedure Details

  *  A complete two-dimensional transthoracic echocardiogram was performed (2D, M-mode, Doppler and 
color flow Doppler).

Left Ventricle

  *  The left ventricle is normal in size.

  *  There is normal left ventricular wall thickness.

  *  Left ventricular systolic function is normal.

  *  No segmental left ventricular wall motion abnormalities are noted.

  *  Ejection Fraction = 55-60%.

  *  The left ventricular wall motion is normal.

Right Ventricle

  *  The right ventricular cavity size is normal (basal dimension <4.2 cm in right ventricular 
apical 4-chamber view).

  *  The right ventricular systolic function is normal as assessed by tricuspid annular plane 
systolic excursion (TAPSE) (normal >1.5 cm).

Atria

  *  The left atrium is mildly dilated.

  *  Right atrial size is normal.

  *  No ASD detected; PFO is not assessed.

Mitral Valve

  *  Focal thickening with slight calcification of the anterior mitral valve leaflet. Cannot exclude 
healed vegetation.

  *  There is no mitral valve stenosis.

  *  There is moderate mitral regurgitation.

Tricuspid Valve

  *  The tricuspid valve is normal in structure and function.

Aortic Valve

  *  The aortic valve is trileaflet.

  *  Aortic valve sclerosis mild, without significant aortic valvular stenosis.

  *  Mild aortic regurgitation.

Pulmonic Valve

  *  The pulmonary valve is not well seen, but the Doppler examination is normal without significant 
regurgitation or stenosis.

Great Vessels

  *  The aortic root is normal size.

Pericardium/Pleural

  *  There is no pericardial effusion.



MMode 2D Measurements and Calculations

IVSd 1.1 cm

IVSs 1.4 cm



LVIDd 4.0 cm

LVIDs 2.5 cm

LVPWd 0.95 cm

LVPWs 1.0 cm



IVS/LVPW 1.2 

FS 38.2 %

EDV(Teich) 71.2 ml

ESV(Teich) 22.1 ml

EF(Teich) 68.9 %



EDV(cubed) 65.3 ml

ESV(cubed) 15.4 ml

EF(cubed) 76.4 %

% IVS thick 20.3 %

% LVPW thick 8.0 %





LV mass(C)d 136.0 grams

LV mass(C)dI 77.9 grams/m\S\2

LV mass(C)s 84.4 grams

LV mass(C)sI 48.3 grams/m\S\2



SV(Teich) 49.1 ml

SI(Teich) 28.1 ml/m\S\2

SV(cubed) 49.9 ml

SI(cubed) 28.6 ml/m\S\2



Ao root diam 3.8 cm

Ao root area 11.3 cm\S\2

ACS 2.2 cm

LA dimension 4.4 cm



LA/Ao 1.2 

LVOT diam 1.9 cm

LVOT area 3.0 cm\S\2





LVAd ap4 36.2 cm\S\2

LVLd ap4 9.3 cm

EDV(MOD-sp4) 113.4 ml

EDV(sp4-el) 120.3 ml

LVAs ap4 23.8 cm\S\2

LVLs ap4 8.0 cm

ESV(MOD-sp4) 57.0 ml

ESV(sp4-el) 60.1 ml

EF(MOD-sp4) 49.8 %

EF(sp4-el) 50.0 %



LVAd ap2 28.0 cm\S\2

LVLd ap2 7.4 cm

EDV(MOD-sp2) 84.1 ml

EDV(sp2-el) 89.3 ml

LVAs ap2 19.7 cm\S\2

LVLs ap2 6.8 cm

ESV(MOD-sp2) 46.6 ml

ESV(sp2-el) 48.4 ml

EF(MOD-sp2) 44.6 %

EF(sp2-el) 45.8 %



LVLd %diff -24.49 %

EDV(MOD-bp) 109.4 ml

LVLs %diff -17.65 %

ESV(MOD-bp) 56.1 ml

EF(MOD-bp) 48.7 %



SV(MOD-sp4) 56.5 ml

SI(MOD-sp4) 32.4 ml/m\S\2





SV(MOD-sp2) 37.5 ml

SI(MOD-sp2) 21.5 ml/m\S\2



SV(MOD-bp) 53.3 ml

SI(MOD-bp) 30.6 ml/m\S\2



SV(sp4-el) 60.2 ml

SI(sp4-el) 34.5 ml/m\S\2



SV(sp2-el) 40.9 ml

SI(sp2-el) 23.4 ml/m\S\2







Doppler Measurements and Calculations

MV E max suzi 119.2 cm/sec

MV A max suzi 41.5 cm/sec



MV E/A 2.9 



MV P1/2t max suzi 112.2 cm/sec

MV P1/2t 99.8 msec

MVA(P1/2t) 2.2 cm\S\2

MV dec slope 329.3 cm/sec\S\2

MV dec time 0.23 sec



Ao V2 max 124.2 cm/sec

Ao max PG 6.2 mmHg

Ao max PG (full) 1.9 mmHg

KEILY(V,A) 2.5 cm\S\2

KEILY(V,D) 2.5 cm\S\2





AI max suzi 350.7 cm/sec

AI max PG 49.2 mmHg

AI dec slope 176.9 cm/sec\S\2

AI P1/2t 580.8 msec



LV V1 max PG 4.3 mmHg



LV V1 max 103.3 cm/sec



MR max suzi 433.5 cm/sec

MR max PG 75.6 mmHg





PA V2 max 63.0 cm/sec

PA max PG 1.6 mmHg



TR max suzi 265.9 cm/sec

## 2018-03-27 NOTE — NEPHROLOGY PROGRESS NOTE
Nephrology Progress Note


Date of Service:


Mar 27, 2018.


Subjective


seen on rounds this am 0800; still w/ hypoglycemia issues; no pain; no f; bld 

cxs already +





Objective











  Date Time  Temp Pulse Resp B/P (MAP) Pulse Ox O2 Delivery O2 Flow Rate FiO2


 


3/27/18 04:00      Room Air  


 


3/27/18 04:00 36.3 60 18 91/52 (65) 95 Room Air  


 


3/27/18 00:00 36.7 61 20 102/68 (79) 91 Room Air  


 


3/27/18 00:00      Room Air  


 


3/26/18 20:00      Room Air  


 


3/26/18 19:55 36.6 68 16 110/68 (82) 94 Room Air  


 


3/26/18 19:00     95 Room Air  


 


3/26/18 19:00 36.7 70 20 115/72    


 


3/26/18 16:28 36.7 70 20 115/72 (86) 95 Room Air  


 


3/26/18 15:42  76 18 94/65 94   


 


3/26/18 13:55  79 20 91/64 94 Room Air  


 


3/26/18 12:36  82      


 


3/26/18 12:35     97 Room Air  


 


3/26/18 11:56 36.5 92 18 91/61 97 Room Air  








Physical Exam:


General Appearance:  no apparent distress, + cachetic (on ra lying flat)


Eyes:  EOMI


ENT:  hearing grossly normal, + nasal congestion


Respiratory/Chest:  no respiratory distress, + decreased breath sounds


Cardiovascular:  regular rate, rhythm, no edema


Abdomen:  normal bowel sounds, non tender, soft, + pertinent finding (no orozco)


Extremities:  no pedal edema, + pertinent finding (avf + t/b)


Neurologic/Psych:  alert, normal mood/affect, oriented x 3 (but some limits w/ 

history details)


Skin:  no jaundice, warm/dry, no rash





Current Inpatient Medications








 Medications


  (Trade)  Dose


 Ordered  Sig/Basilia


 Route  Start Time


 Stop Time Status Last Admin


Dose Admin


 


 Heparin Sodium


  (Porcine)


  (Heparin Sq 5000


 Unit/0.5ml)  5,000 unit  Q8


 SQ  3/26/18 22:00


 4/25/18 21:59  3/27/18 06:01


5,000 UNIT


 


 Acetaminophen


  (Tylenol Tab)  650 mg  Q4H  PRN


 PO  3/26/18 14:30


 4/25/18 14:29   


 


 


 Insulin Glargine


  (Lantus Solostar


 Pen)  20 units  HS


 SC  3/26/18 21:00


 4/25/18 20:59  3/26/18 21:31


20 UNITS


 


 Insulin Aspart


  (novoLOG ASPART)  **SLIDING


 SCALE**


 **If C...  TID@1100,1630,2100


 SC  3/26/18 16:30


 4/25/18 16:29  3/26/18 21:30


6 UNITS


 


 Glucose


  (Glucose 40% Gel)  15-30


 GRAMS 15


 GRAMS...  UD  PRN


 PO  3/26/18 15:15


 4/25/18 15:14   


 


 


 Glucose


  (Glucose Chew


 Tab)  4-8


 Tablets 4


 Tabl...  UD  PRN


 PO  3/26/18 15:15


 4/25/18 15:14  3/26/18 17:31


4 TABS


 


 Dextrose


  (Dextrose 50%


 50ML Syringe)  25-50ML OF


 50% DW IV


 FOR...  UD  PRN


 IV  3/26/18 15:15


 4/25/18 15:14   


 


 


 Glucagon


  (Glucagon Inj)  1 mg  UD  PRN


 SQ  3/26/18 15:15


 4/25/18 15:14   


 


 


 Miscellaneous


 Information


  (Consult


 Glycemic


 Management


 Pharmacy)  1 ea  UD  PRN


 N/A  3/26/18 15:22


 4/25/18 15:21   


 


 


 Miscellaneous


 Information


  (Consult)  1 ea  UD  PRN


 N/A  3/26/18 15:30


 4/25/18 15:29   


 


 


 Allopurinol


  (Zyloprim Tab)  100 mg  DAILY


 PO  3/27/18 09:00


 4/26/18 08:59   


 


 


 Calcium Acetate


  (Phoslo Cap)  667 mg  TIDM


 PO  3/26/18 17:00


 4/25/18 17:59  3/26/18 17:23


667 MG


 


 Clopidogrel


 Bisulfate


  (plAVix TAB)  75 mg  DAILY


 PO  3/27/18 09:00


 4/26/18 08:59   


 


 


 Ferrous Sulfate


  (Feosol Tab)  325 mg  TIDM


 PO  3/26/18 17:00


 4/25/18 17:59  3/26/18 17:23


325 MG


 


 Pantoprazole


 Sodium


  (Protonix Tab)  40 mg  DAILY


 PO  3/27/18 09:00


 4/26/18 08:59   


 


 


 Pravastatin Sodium


  (Pravachol Tab)  20 mg  DAILY


 PO  3/27/18 09:00


 4/26/18 08:59   


 


 


 Tacrolimus


  (Prograf Cap)  4 mg  BID


 PO  3/26/18 21:00


 4/25/18 20:59  3/26/18 21:29


4 MG


 


 Amitriptyline HCl


  (Elavil Tab)  150 mg  HS


 PO  3/26/18 21:00


 4/25/18 20:59  3/26/18 21:28


150 MG


 


 Cholecalciferol


  (Vitamin D Tab)  2,000


 inter.unit  DAILY


 PO  3/27/18 09:00


 4/26/18 08:59   


 


 


 Insulin Aspart


  (novoLOG ASPART)  **SLIDING


 SCALE**


 **If C...  QDB


 SC  3/27/18 08:00


 4/26/18 07:59   


 


 


 Non-Formulary


 Medication


  (Non-Formulary


 Patient'S Own Med)  1 ea  DAILY


 PO  3/27/18 09:00


 4/26/18 08:59   


 











Last 24 Hours








Test


  3/26/18


12:32 3/26/18


12:35 3/26/18


16:58 3/26/18


17:24


 


White Blood Count 10.47 K/uL    


 


Red Blood Count 4.27 M/uL    


 


Hemoglobin 13.5 g/dL    


 


Hematocrit 42.3 %    


 


Mean Corpuscular Volume 99.1 fL    


 


Mean Corpuscular Hemoglobin 31.6 pg    


 


Mean Corpuscular Hemoglobin


Concent 31.9 g/dl 


  


  


  


 


 


Platelet Count 67 K/uL    


 


Mean Platelet Volume 10.0 fL    


 


Neutrophils (%) (Auto) 77.7 %    


 


Lymphocytes (%) (Auto) 14.7 %    


 


Monocytes (%) (Auto) 6.0 %    


 


Eosinophils (%) (Auto) 0.8 %    


 


Basophils (%) (Auto) 0.2 %    


 


Neutrophils # (Auto) 8.14 K/uL    


 


Lymphocytes # (Auto) 1.54 K/uL    


 


Monocytes # (Auto) 0.63 K/uL    


 


Eosinophils # (Auto) 0.08 K/uL    


 


Basophils # (Auto) 0.02 K/uL    


 


RDW Standard Deviation 48.1 fL    


 


RDW Coefficient of Variation 13.4 %    


 


Immature Granulocyte % (Auto) 0.6 %    


 


Immature Granulocyte # (Auto) 0.06 K/uL    


 


Platelet Estimate DECREASED    


 


Prothrombin Time 12.0 SECONDS    


 


Prothromb Time International


Ratio 1.1 


  


  


  


 


 


Activated Partial


Thromboplast Time 30.0 SECONDS 


  


  


  


 


 


Partial Thromboplastin Ratio 1.2    


 


Sodium Level 139 mmol/L    


 


Potassium Level 4.0 mmol/L    


 


Chloride Level 103 mmol/L    


 


Carbon Dioxide Level 26 mmol/L    


 


Anion Gap 10.0 mmol/L    


 


Blood Urea Nitrogen 60 mg/dl    


 


Creatinine 6.99 mg/dl    


 


Est Creatinine Clear Calc


Drug Dose 9.5 ml/min 


  


  


  


 


 


Estimated GFR (


American) 8.7 


  


  


  


 


 


Estimated GFR (Non-


American 7.5 


  


  


  


 


 


BUN/Creatinine Ratio 8.5    


 


Random Glucose 103 mg/dl    


 


Calcium Level 7.5 mg/dl    


 


Total Bilirubin 0.5 mg/dl    


 


Aspartate Amino Transf


(AST/SGOT) 19 U/L 


  


  


  


 


 


Alanine Aminotransferase


(ALT/SGPT) 28 U/L 


  


  


  


 


 


Alkaline Phosphatase 125 U/L    


 


Total Protein 6.8 gm/dl    


 


Albumin 2.5 gm/dl    


 


Globulin 4.3 gm/dl    


 


Albumin/Globulin Ratio 0.6    


 


Bedside Lactic Acid Venous  0.71 mmol/L   


 


Bedside Glucose   53 mg/dl  56 mg/dl 


 


Test


  3/26/18


17:43 3/26/18


18:02 3/26/18


20:28 3/26/18


23:30


 


Bedside Glucose 91 mg/dl   244 mg/dl  


 


Magnesium Level  2.1 mg/dl   


 


Troponin I  < 0.015 ng/ml   < 0.015 ng/ml 


 


Test


  3/27/18


05:55 


  


  


 


 


White Blood Count 8.00 K/uL    


 


Red Blood Count 3.79 M/uL    


 


Hemoglobin 11.9 g/dL    


 


Hematocrit 37.1 %    


 


Mean Corpuscular Volume 97.9 fL    


 


Mean Corpuscular Hemoglobin 31.4 pg    


 


Mean Corpuscular Hemoglobin


Concent 32.1 g/dl 


  


  


  


 


 


RDW Standard Deviation 48.6 fL    


 


RDW Coefficient of Variation 13.5 %    


 


Platelet Count 70 K/uL    


 


Mean Platelet Volume 9.5 fL    


 


Sodium Level 140 mmol/L    


 


Potassium Level 4.4 mmol/L    


 


Chloride Level 105 mmol/L    


 


Carbon Dioxide Level 23 mmol/L    


 


Anion Gap 12.0 mmol/L    


 


Blood Urea Nitrogen 63 mg/dl    


 


Creatinine 7.09 mg/dl    


 


Est Creatinine Clear Calc


Drug Dose 9.6 ml/min 


  


  


  


 


 


Estimated GFR (


American) 8.6 


  


  


  


 


 


Estimated GFR (Non-


American 7.4 


  


  


  


 


 


BUN/Creatinine Ratio 8.9    


 


Random Glucose 54 mg/dl    


 


Calcium Level 7.3 mg/dl    


 


Troponin I < 0.015 ng/ml    


 


Random Vancomycin Level 15.0 mcg/ml    














 Date/Time


Source Procedure


Growth Status


 


 


 3/26/18 13:52


Blood Fungal Smear


Pending Received


 


 3/26/18 13:52


Blood Fungal Culture


Pending Received


 


 3/26/18 12:45


Blood Blood Culture - Preliminary


Gram Positive Cocci Resulted


 


 3/26/18 12:32


Blood Blood Culture - Preliminary


Gram Positive Cocci Resulted


 


 3/26/18 20:20


Nasal MRSA DNA Surveillance Screen - Final


Specimen Negative for MRSA by DNA Probe Complete











Assessment & Plan


64 y/o M w/ ESRD on MWF HD via tunnelled line and hx of liver txplt, new onset 

diabetes after txplt, htn sent to ER d/t bacteremia w/ 4/4 blood culture 

bottles from 3/23 w/ GPC in chains and pairs. Also w/ heel wound.





ESRD on HD


volume wise he is on RA w/ acceptable blood pressures.  chemistries, hgb are 

also acceptable


-will attempt gentle dialysis today using avf << this had been the plan but the 

pt was bumped to tomorrow d/t urgent case


-repeat cultures have been drawn and are in less than 24 hrs positive; had a 

dose of vancomycin in ER


-given liver txplt hx/ immunosuppressed status, lower threshold than usual to 

remove tdc > consult vascular pending , culture tip of cath ordered  (though he 

has already had abtx)





Bacteremia 


-remove tdc as above <> consult placed; d/w vascular; removed today


-NPO for procedure


-f/u pending cxs including those from outside system


-vanco acceptable for now 





S/p Liver txplt


-continue routine tacro


-states he needs to be set up w/ liver txplt at d/c again; will facilitate





ECG changes


-d/w hospitalist and admitting team; pt w/o sx; f/u TTE





Appreciate consultation; will follow with you.

## 2018-03-27 NOTE — [facility] OPERATIVE REPORT
Operative Report


Operative Date


Mar 27, 2018.





Pre-Operative Diagnosis





Bacteremia





Post-Operative Diagnosis





Bacteremia





Procedure(s) Performed





Removal Of Perm Catheter.





Surgeon


Dr. Waite





Assistant Surgeon(s)


Dr. Dimas





Estimated Blood Loss


3





Specimens





a: Tip of perm catheter for culture





b: Explant perm cath





Drains


None





Anesthesia Type


None





Complication(s)


none





Disposition


no








Indications


64 y/o male with ESRD on HD who presented to hospital with bacteremia. He ware 

recommended to have his tunnelled hemodialysis line removed. HE agreed to the 

above procedure





Description of Procedure


The patient was taken to the angio suite and placed in the supine position.  

The right side of the neck, chest wall and catheter were prepped and draped in 

a sterile manner.  Local anesthesia was then accomplished.  Using sharp and 

blunt dissection, the cuff of the permcath was freed up from the surrounding 

fibrous tissue.  The permcath and cuff were completely removed.  Pressure was 

then applied and adequate hemostasis was obtained. The tip of the catheter was 

sent for culture.   A sterile dressing was then applied.  The patient left the 

angio suite in good condition and tolerated the procedure well.  I, Dr. Waite 

was present and scrubbed for the entire procedure.


I attest to the content of the Intraoperative Record and any orders documented 

therein.  Any exceptions are noted below.

## 2018-03-27 NOTE — MEDICAL CONSULT
Consultation


Date of Consultation:


Mar 27, 2018.


Attending Physician:


Sasha Olson M.D.


Reason for Consultation:


Bacteremia


History of Present Illness


63-year-old male with end-stage renal disease on hemodialysis, with tunneled 

catheter in right chest, who was in usual state of health until 1 day prior to 

admission when he felt somewhat more fatigued with occasional chills.  Denies 

any significant fever.  He had dialysis the next day and blood cultures were 

drawn, and I have now returned positive for gram-positive cocci in pairs and 

chains.  Patient was called and admitted to the hospital for further management 

he was started empirically on vancomycin.  Repeat blood cultures are also 

growing gram-positive cocci.  Patient states that he is feeling somewhat better

, less fatigue, no fever.  Has plans for removal of his tunneled catheter later 

today.  Denies any cough, gastrointestinal complaints, or other symptoms.





Past Medical/Surgical History


Medical Problems:


(1) Cerebral palsy


(2) Diabetes mellitus type II, uncontrolled


(3) End stage chronic kidney disease


(4) ESRD (end stage renal disease) on dialysis


(5) Hepatitis B


(6) Presence of stent in coronary artery in patient with coronary artery disease


Surgical Problems:


(1) History of right hip replacement


(2) S/P liver transplant





Social History


Smoking Status:  Former Smoker


Alcohol Use:  none


Drug Use:  none


Marital Status:  single


Occupation Status:  unemployed





Allergies


Coded Allergies:  


     No Known Allergies (Unverified , 3/26/18)





Current Inpatient Medications





Current Inpatient Medications








 Medications


  (Trade)  Dose


 Ordered  Sig/Basilia


 Route  Start Time


 Stop Time Status Last Admin


Dose Admin


 


 Heparin Sodium


  (Porcine)


  (Heparin Sq 5000


 Unit/0.5ml)  5,000 unit  Q8


 SQ  3/26/18 22:00


 4/25/18 21:59  3/27/18 06:01


5,000 UNIT


 


 Acetaminophen


  (Tylenol Tab)  650 mg  Q4H  PRN


 PO  3/26/18 14:30


 4/25/18 14:29   


 


 


 Insulin Aspart


  (novoLOG ASPART)  **SLIDING


 SCALE**


 **If C...  TID@1100,1630,2100


 SC  3/26/18 16:30


 4/25/18 16:29  3/26/18 21:30


6 UNITS


 


 Glucose


  (Glucose 40% Gel)  15-30


 GRAMS 15


 GRAMS...  UD  PRN


 PO  3/26/18 15:15


 4/25/18 15:14   


 


 


 Glucose


  (Glucose Chew


 Tab)  4-8


 Tablets 4


 Tabl...  UD  PRN


 PO  3/26/18 15:15


 4/25/18 15:14  3/26/18 17:31


4 TABS


 


 Dextrose


  (Dextrose 50%


 50ML Syringe)  25-50ML OF


 50% DW IV


 FOR...  UD  PRN


 IV  3/26/18 15:15


 4/25/18 15:14  3/27/18 07:57


50 ML


 


 Glucagon


  (Glucagon Inj)  1 mg  UD  PRN


 SQ  3/26/18 15:15


 4/25/18 15:14   


 


 


 Miscellaneous


 Information


  (Consult


 Glycemic


 Management


 Pharmacy)  1 ea  UD  PRN


 N/A  3/26/18 15:22


 4/25/18 15:21   


 


 


 Miscellaneous


 Information


  (Consult)  1 ea  UD  PRN


 N/A  3/26/18 15:30


 4/25/18 15:29   


 


 


 Allopurinol


  (Zyloprim Tab)  100 mg  DAILY


 PO  3/27/18 09:00


 4/26/18 08:59   


 


 


 Calcium Acetate


  (Phoslo Cap)  667 mg  TIDM


 PO  3/26/18 17:00


 4/25/18 17:59  3/26/18 17:23


667 MG


 


 Clopidogrel


 Bisulfate


  (plAVix TAB)  75 mg  DAILY


 PO  3/27/18 09:00


 4/26/18 08:59   


 


 


 Ferrous Sulfate


  (Feosol Tab)  325 mg  TIDM


 PO  3/26/18 17:00


 4/25/18 17:59  3/26/18 17:23


325 MG


 


 Pantoprazole


 Sodium


  (Protonix Tab)  40 mg  DAILY


 PO  3/27/18 09:00


 4/26/18 08:59   


 


 


 Pravastatin Sodium


  (Pravachol Tab)  20 mg  DAILY


 PO  3/27/18 09:00


 4/26/18 08:59   


 


 


 Tacrolimus


  (Prograf Cap)  4 mg  BID


 PO  3/26/18 21:00


 4/25/18 20:59  3/26/18 21:29


4 MG


 


 Amitriptyline HCl


  (Elavil Tab)  150 mg  HS


 PO  3/26/18 21:00


 4/25/18 20:59  3/26/18 21:28


150 MG


 


 Cholecalciferol


  (Vitamin D Tab)  2,000


 inter.unit  DAILY


 PO  3/27/18 09:00


 4/26/18 08:59   


 


 


 Insulin Aspart


  (novoLOG ASPART)  **SLIDING


 SCALE**


 **If C...  QDB


 SC  3/27/18 08:00


 4/26/18 07:59   


 


 


 Non-Formulary


 Medication


  (Non-Formulary


 Patient'S Own Med)  1 ea  DAILY


 PO  3/27/18 09:00


 4/26/18 08:59   


 


 


 Insulin Glargine


  (Lantus Solostar


 Pen)  15 units  HS


 SC  3/27/18 21:00


 4/26/18 20:59   


 











Review of Systems


All systems were reviewed and are negative except as per HPI





Physical Exam











  Date Time  Temp Pulse Resp B/P (MAP) Pulse Ox O2 Delivery O2 Flow Rate FiO2


 


3/27/18 09:24 36.5 64 18 110/68 (82) 93 Room Air  


 


3/27/18 08:04 36.3 65 16 104/66 (79) 93 Room Air  


 


3/27/18 04:00      Room Air  


 


3/27/18 04:00 36.3 60 18 91/52 (65) 95 Room Air  


 


3/27/18 00:00 36.7 61 20 102/68 (79) 91 Room Air  


 


3/27/18 00:00      Room Air  


 


3/26/18 20:00      Room Air  


 


3/26/18 19:55 36.6 68 16 110/68 (82) 94 Room Air  


 


3/26/18 19:00     95 Room Air  


 


3/26/18 19:00 36.7 70 20 115/72    


 


3/26/18 16:28 36.7 70 20 115/72 (86) 95 Room Air  


 


3/26/18 15:42  76 18 94/65 94   


 


3/26/18 13:55  79 20 91/64 94 Room Air  


 


3/26/18 12:36  82      


 


3/26/18 12:35     97 Room Air  


 


3/26/18 11:56 36.5 92 18 91/61 97 Room Air  








General Appearance:  WD/WN, no apparent distress


Head:  normocephalic, atraumatic


Eyes:  normal inspection, EOMI, sclerae normal


ENT:  normal ENT inspection, hearing grossly normal, pharynx normal


Neck:  supple, no adenopathy, thyroid normal, trachea midline


Respiratory/Chest:  chest non-tender, lungs clear, normal breath sounds, no 

respiratory distress


Cardiovascular:  regular rate, rhythm, no gallop, no murmur


Abdomen/GI:  normal bowel sounds, non tender, soft, no organomegaly


Back:  normal inspection, no CVA tenderness


Extremities/Musculoskelatal:  no calf tenderness, non-tender


Neurologic/Psych:  alert, normal mood/affect, oriented x 3


Skin:  normal color, warm/dry, no rash, + pertinent finding (Right chest 

catheter site without evidence of infection)


Lymphatic:  no adenopathy





Laboratory Results











 Date/Time


Source Procedure


Growth Status


 


 


 3/26/18 13:52


Blood Fungal Smear


Pending Received


 


 3/26/18 13:52


Blood Fungal Culture


Pending Received


 


 3/26/18 12:45


Blood Blood Culture - Preliminary


Gram Positive Cocci Resulted


 


 3/26/18 12:32


Blood Blood Culture - Preliminary


Gram Positive Cocci Resulted


 


 3/26/18 20:20


Nasal MRSA DNA Surveillance Screen - Final


Specimen Negative for MRSA by DNA Probe Complete








Last 24 Hours








Test


  3/26/18


12:32 3/26/18


12:35 3/26/18


16:58 3/26/18


17:24


 


White Blood Count 10.47 K/uL    


 


Red Blood Count 4.27 M/uL    


 


Hemoglobin 13.5 g/dL    


 


Hematocrit 42.3 %    


 


Mean Corpuscular Volume 99.1 fL    


 


Mean Corpuscular Hemoglobin 31.6 pg    


 


Mean Corpuscular Hemoglobin


Concent 31.9 g/dl 


  


  


  


 


 


Platelet Count 67 K/uL    


 


Mean Platelet Volume 10.0 fL    


 


Neutrophils (%) (Auto) 77.7 %    


 


Lymphocytes (%) (Auto) 14.7 %    


 


Monocytes (%) (Auto) 6.0 %    


 


Eosinophils (%) (Auto) 0.8 %    


 


Basophils (%) (Auto) 0.2 %    


 


Neutrophils # (Auto) 8.14 K/uL    


 


Lymphocytes # (Auto) 1.54 K/uL    


 


Monocytes # (Auto) 0.63 K/uL    


 


Eosinophils # (Auto) 0.08 K/uL    


 


Basophils # (Auto) 0.02 K/uL    


 


RDW Standard Deviation 48.1 fL    


 


RDW Coefficient of Variation 13.4 %    


 


Immature Granulocyte % (Auto) 0.6 %    


 


Immature Granulocyte # (Auto) 0.06 K/uL    


 


Platelet Estimate DECREASED    


 


Prothrombin Time 12.0 SECONDS    


 


Prothromb Time International


Ratio 1.1 


  


  


  


 


 


Activated Partial


Thromboplast Time 30.0 SECONDS 


  


  


  


 


 


Partial Thromboplastin Ratio 1.2    


 


Sodium Level 139 mmol/L    


 


Potassium Level 4.0 mmol/L    


 


Chloride Level 103 mmol/L    


 


Carbon Dioxide Level 26 mmol/L    


 


Anion Gap 10.0 mmol/L    


 


Blood Urea Nitrogen 60 mg/dl    


 


Creatinine 6.99 mg/dl    


 


Est Creatinine Clear Calc


Drug Dose 9.5 ml/min 


  


  


  


 


 


Estimated GFR (


American) 8.7 


  


  


  


 


 


Estimated GFR (Non-


American 7.5 


  


  


  


 


 


BUN/Creatinine Ratio 8.5    


 


Random Glucose 103 mg/dl    


 


Calcium Level 7.5 mg/dl    


 


Total Bilirubin 0.5 mg/dl    


 


Aspartate Amino Transf


(AST/SGOT) 19 U/L 


  


  


  


 


 


Alanine Aminotransferase


(ALT/SGPT) 28 U/L 


  


  


  


 


 


Alkaline Phosphatase 125 U/L    


 


Total Protein 6.8 gm/dl    


 


Albumin 2.5 gm/dl    


 


Globulin 4.3 gm/dl    


 


Albumin/Globulin Ratio 0.6    


 


Bedside Lactic Acid Venous  0.71 mmol/L   


 


Bedside Glucose   53 mg/dl  56 mg/dl 


 


Test


  3/26/18


17:43 3/26/18


18:02 3/26/18


20:28 3/26/18


23:30


 


Bedside Glucose 91 mg/dl   244 mg/dl  


 


Magnesium Level  2.1 mg/dl   


 


Troponin I  < 0.015 ng/ml   < 0.015 ng/ml 


 


Test


  3/27/18


05:55 3/27/18


08:13 


  


 


 


White Blood Count 8.00 K/uL    


 


Red Blood Count 3.79 M/uL    


 


Hemoglobin 11.9 g/dL    


 


Hematocrit 37.1 %    


 


Mean Corpuscular Volume 97.9 fL    


 


Mean Corpuscular Hemoglobin 31.4 pg    


 


Mean Corpuscular Hemoglobin


Concent 32.1 g/dl 


  


  


  


 


 


RDW Standard Deviation 48.6 fL    


 


RDW Coefficient of Variation 13.5 %    


 


Platelet Count 70 K/uL    


 


Mean Platelet Volume 9.5 fL    


 


Sodium Level 140 mmol/L    


 


Potassium Level 4.4 mmol/L    


 


Chloride Level 105 mmol/L    


 


Carbon Dioxide Level 23 mmol/L    


 


Anion Gap 12.0 mmol/L    


 


Blood Urea Nitrogen 63 mg/dl    


 


Creatinine 7.09 mg/dl    


 


Est Creatinine Clear Calc


Drug Dose 9.6 ml/min 


  


  


  


 


 


Estimated GFR (


American) 8.6 


  


  


  


 


 


Estimated GFR (Non-


American 7.4 


  


  


  


 


 


BUN/Creatinine Ratio 8.9    


 


Random Glucose 54 mg/dl    


 


Calcium Level 7.3 mg/dl    


 


Troponin I < 0.015 ng/ml    


 


Random Vancomycin Level 15.0 mcg/ml    


 


Bedside Glucose  122 mg/dl   








                                                                               

                                                                 


Patient Name: KAREY GARCIA                               Unit Number:  

[id number]                  


 








 











Dictated: 03/26/18 1233


 


Transcribed: 03/26/18 1233


 


JA


 


Printed Date/Time: [~ rep prt dt]/[~ rep prt tm]








 [~ rep ct labl] - [~ rep ct ivnm]


 





   [facility]


 Radiology Department


 [place] [address] [phone]





 











Dictated: 03/26/18 1233


 


Transcribed: 03/26/18 1233


 


JA


 


Printed Date/Time: [~ rep prt dt]/[~ rep prt tm]








 [~ rep ct labl] - [~ rep ct ivnm]


 








[~ rep ct add3]]


[facility] PORTABLE





CLINICAL HISTORY: Sepsis    





COMPARISON STUDY:  No previous studies for comparison.





FINDINGS: A dual lumen right internal jugular central venous catheter is in


place. There is no pneumothorax or pleural effusion. There are old left rib


fractures. There is no consolidation or evidence for pulmonary edema.


Cardiomediastinal silhouette is unremarkable. 





IMPRESSION:  No acute cardiopulmonary findings. 














Electronically signed by:  Robert Traore M.D.


3/26/2018 12:33 PM





Dictated Date/Time:  3/26/2018 12:33 PM





 The status of this report is Signed.   


 Draft = Not yet reviewed or approved by Radiologist.  


 Signed = Reviewed and approved by Radiologist.


<AttendingPhy></AttendingPhy> <FamilyPhy>Gonzalo Rothman D.O.</FamilyPhy> <

PrimaryPhy>Gonzalo Rothman D.O.</PrimaryPhy> <UnitNumber>[id number]</

UnitNumber> <VisitNumber>[id number]</VisitNumber> <PatientName>KAREY GARCIA</

PatientName> <DateOfBirth>1955</DateOfBirth> <Location>[facility]</Location> <

ServiceDate>03/26/18</ServiceDate> <MNE>ESINDI</MNE> <OrderingPhy>Klaus Benítez MD</OrderingPhy> <OrderingPhyMNE>f rep ord dr john</OrderingPhyMNE> <

DictatingPhyMNE>f rep dict dr john</DictatingPhyMNE> <CCListMNE>f rep ct mne</

CCListMNE> <AdmittingPhyMNE>f pt admit dr john</AdmittingPhyMNE> <AttendingPhyMNE

>f pt attend dr john</AttendingPhyMNE>


<ConsultingPhyMNE>f pt consult dr john</ConsultingPhyMNE> <FamilyPhyMNE>f pt fam 

dr john</FamilyPhyMNE> <OtherPhyMNE>f pt other dr john</OtherPhyMNE> <

PrimaryPhyMNE>f pt prim care dr john</PrimaryPhyMNE> <ReferringPhyMNE>f pt 

referring dr john</ReferringPhyMNE>





Assessment & Plan


63-year-old male with end-stage renal disease on dialysis now with Gram-

positive bacteremia likely from infected dialysis catheter.  Patient to have 

catheter removed later today.  Would continue patient on vancomycin pending 

final identification and sensitivities.  Will contact [facility] to 

see if final culture results available.  Will follow.

## 2018-03-27 NOTE — PRE SEDATION ASSESSMENT
Pre Sedation Assessment


General


Date of Sedation:


Mar 27, 2018.





Vital Signs Past 12 Hours








  Date Time  Temp Pulse Resp B/P (MAP) Pulse Ox O2 Delivery O2 Flow Rate FiO2


 


3/27/18 09:24 36.5 64 18 110/68 (82) 93 Room Air  


 


3/27/18 08:04 36.3 65 16 104/66 (79) 93 Room Air  


 


3/27/18 08:00     95 Room Air  


 


3/27/18 04:00      Room Air  


 


3/27/18 04:00 36.3 60 18 91/52 (65) 95 Room Air  


 


3/27/18 00:00 36.7 61 20 102/68 (79) 91 Room Air  


 


3/27/18 00:00      Room Air  











Review


Cardiovascular:  regular rate, rhythm


Lungs:  lungs clear





Pre-Sedation Airway Assessment


Smoking Status:  Former Smoker


Hx of Sleep Apnea:  No


Short Thick Neck:  No


Thyro-mental Distance:  > 3 Finger Breadths


Oral Cavity:  Dentures


Mallampati Classification:  Class III


ASA Classification:  Class III





NPO Status


Date of Last Intake of Fluids:  Mar 26, 2018


Time of Last Intake of Fluids:  0000


Date of Last Intake of Solids:  Mar 26, 2018


Time of Last Intake of Solids:  0000





Procedure Planning


Contraindications for Sedation:  None


Current Medications Reviewed:  Yes





Notes








The planned sedation has been discussed with the patient. Informed Consent was 

obtained.  I have identified the patient, determined the appropriateness of 

sedation and have assessed the patient immediately prior to the procedure.  





All medicine(s) and interventions are by my order.

## 2018-03-27 NOTE — [facility] POST OPERATIVE BRIEF NOTE
Immediate Operative Summary


Operative Date


Mar 27, 2018.





Pre-Operative Diagnosis





Bacteremia





Post-Operative Diagnosis





Bacteremia





Procedure(s) Performed





Removal Of Perm Catheter.





Surgeon


Dr. Waite





Assistant Surgeon(s)


Dr. Dimas





Estimated Blood Loss


3





Findings


Consistent with Post-Op Diagnosis





Specimens





a: Tip of perm catheter for culture





b: Explant perm cath





Drains


None





Anesthesia Type


None





Complication(s)


none





Disposition


Accompanied Pt To Recover:  no


Disposition:

## 2018-03-28 VITALS — DIASTOLIC BLOOD PRESSURE: 70 MMHG | HEART RATE: 64 BPM | SYSTOLIC BLOOD PRESSURE: 118 MMHG

## 2018-03-28 VITALS — DIASTOLIC BLOOD PRESSURE: 71 MMHG | HEART RATE: 73 BPM | SYSTOLIC BLOOD PRESSURE: 112 MMHG

## 2018-03-28 VITALS
DIASTOLIC BLOOD PRESSURE: 67 MMHG | SYSTOLIC BLOOD PRESSURE: 111 MMHG | OXYGEN SATURATION: 92 % | TEMPERATURE: 97.34 F | HEART RATE: 68 BPM

## 2018-03-28 VITALS — SYSTOLIC BLOOD PRESSURE: 116 MMHG | HEART RATE: 68 BPM | DIASTOLIC BLOOD PRESSURE: 68 MMHG

## 2018-03-28 VITALS — DIASTOLIC BLOOD PRESSURE: 73 MMHG | HEART RATE: 67 BPM | SYSTOLIC BLOOD PRESSURE: 112 MMHG

## 2018-03-28 VITALS
SYSTOLIC BLOOD PRESSURE: 118 MMHG | HEART RATE: 67 BPM | TEMPERATURE: 97.7 F | OXYGEN SATURATION: 93 % | DIASTOLIC BLOOD PRESSURE: 67 MMHG

## 2018-03-28 VITALS — HEART RATE: 71 BPM | SYSTOLIC BLOOD PRESSURE: 114 MMHG | DIASTOLIC BLOOD PRESSURE: 67 MMHG

## 2018-03-28 VITALS — SYSTOLIC BLOOD PRESSURE: 119 MMHG | DIASTOLIC BLOOD PRESSURE: 72 MMHG | HEART RATE: 69 BPM

## 2018-03-28 VITALS — SYSTOLIC BLOOD PRESSURE: 117 MMHG | DIASTOLIC BLOOD PRESSURE: 73 MMHG | TEMPERATURE: 97.88 F | HEART RATE: 66 BPM

## 2018-03-28 VITALS — HEART RATE: 71 BPM | DIASTOLIC BLOOD PRESSURE: 72 MMHG | SYSTOLIC BLOOD PRESSURE: 112 MMHG

## 2018-03-28 VITALS
HEART RATE: 64 BPM | DIASTOLIC BLOOD PRESSURE: 70 MMHG | SYSTOLIC BLOOD PRESSURE: 111 MMHG | OXYGEN SATURATION: 93 % | TEMPERATURE: 97.7 F

## 2018-03-28 VITALS — HEART RATE: 69 BPM | TEMPERATURE: 97.88 F | DIASTOLIC BLOOD PRESSURE: 66 MMHG | SYSTOLIC BLOOD PRESSURE: 121 MMHG

## 2018-03-28 VITALS — HEART RATE: 67 BPM | DIASTOLIC BLOOD PRESSURE: 68 MMHG | SYSTOLIC BLOOD PRESSURE: 108 MMHG

## 2018-03-28 VITALS — SYSTOLIC BLOOD PRESSURE: 113 MMHG | DIASTOLIC BLOOD PRESSURE: 73 MMHG | HEART RATE: 66 BPM

## 2018-03-28 VITALS — HEART RATE: 65 BPM | SYSTOLIC BLOOD PRESSURE: 114 MMHG | DIASTOLIC BLOOD PRESSURE: 72 MMHG

## 2018-03-28 VITALS — SYSTOLIC BLOOD PRESSURE: 116 MMHG | HEART RATE: 71 BPM | DIASTOLIC BLOOD PRESSURE: 68 MMHG

## 2018-03-28 VITALS — SYSTOLIC BLOOD PRESSURE: 113 MMHG | DIASTOLIC BLOOD PRESSURE: 70 MMHG | HEART RATE: 63 BPM

## 2018-03-28 VITALS — SYSTOLIC BLOOD PRESSURE: 116 MMHG | HEART RATE: 62 BPM | DIASTOLIC BLOOD PRESSURE: 69 MMHG

## 2018-03-28 VITALS — OXYGEN SATURATION: 92 %

## 2018-03-28 VITALS — SYSTOLIC BLOOD PRESSURE: 115 MMHG | DIASTOLIC BLOOD PRESSURE: 73 MMHG | HEART RATE: 66 BPM

## 2018-03-28 VITALS — SYSTOLIC BLOOD PRESSURE: 112 MMHG | HEART RATE: 71 BPM | DIASTOLIC BLOOD PRESSURE: 70 MMHG

## 2018-03-28 LAB
BUN SERPL-MCNC: 73 MG/DL (ref 7–18)
CALCIUM SERPL-MCNC: 7.7 MG/DL (ref 8.5–10.1)
CO2 SERPL-SCNC: 21 MMOL/L (ref 21–32)
CREAT SERPL-MCNC: 7.4 MG/DL (ref 0.6–1.4)
EOSINOPHIL NFR BLD AUTO: 81 K/UL (ref 130–400)
GLUCOSE SERPL-MCNC: 65 MG/DL (ref 70–99)
HCT VFR BLD CALC: 37.1 % (ref 42–52)
HGB BLD-MCNC: 12.1 G/DL (ref 14–18)
MCH RBC QN AUTO: 31.6 PG (ref 25–34)
MCHC RBC AUTO-ENTMCNC: 32.6 G/DL (ref 32–36)
MCV RBC AUTO: 96.9 FL (ref 80–100)
PMV BLD AUTO: 9.5 FL (ref 7.4–10.4)
POTASSIUM SERPL-SCNC: 4.7 MMOL/L (ref 3.5–5.1)
RED CELL DISTRIBUTION WIDTH CV: 13.5 % (ref 11.5–14.5)
RED CELL DISTRIBUTION WIDTH SD: 47.6 FL (ref 36.4–46.3)
SODIUM SERPL-SCNC: 140 MMOL/L (ref 136–145)
WBC # BLD AUTO: 7.46 K/UL (ref 4.8–10.8)

## 2018-03-28 RX ADMIN — HEPARIN SODIUM SCH UNIT: 10000 INJECTION, SOLUTION INTRAVENOUS; SUBCUTANEOUS at 13:29

## 2018-03-28 RX ADMIN — INSULIN ASPART SCH UNITS: 100 INJECTION, SOLUTION INTRAVENOUS; SUBCUTANEOUS at 11:00

## 2018-03-28 RX ADMIN — TACROLIMUS SCH MG: 1 CAPSULE ORAL at 22:35

## 2018-03-28 RX ADMIN — ALLOPURINOL SCH MG: 100 TABLET ORAL at 08:28

## 2018-03-28 RX ADMIN — INSULIN ASPART SCH UNITS: 100 INJECTION, SOLUTION INTRAVENOUS; SUBCUTANEOUS at 22:39

## 2018-03-28 RX ADMIN — PANTOPRAZOLE SCH MG: 40 TABLET, DELAYED RELEASE ORAL at 08:30

## 2018-03-28 RX ADMIN — FERROUS SULFATE TAB EC 325 MG (65 MG FE EQUIVALENT) SCH MG: 325 (65 FE) TABLET DELAYED RESPONSE at 11:23

## 2018-03-28 RX ADMIN — HEPARIN SODIUM SCH UNIT: 10000 INJECTION, SOLUTION INTRAVENOUS; SUBCUTANEOUS at 22:39

## 2018-03-28 RX ADMIN — FERROUS SULFATE TAB EC 325 MG (65 MG FE EQUIVALENT) SCH MG: 325 (65 FE) TABLET DELAYED RESPONSE at 08:28

## 2018-03-28 RX ADMIN — HEPARIN SODIUM SCH UNIT: 10000 INJECTION, SOLUTION INTRAVENOUS; SUBCUTANEOUS at 06:00

## 2018-03-28 RX ADMIN — CLOPIDOGREL BISULFATE SCH MG: 75 TABLET, FILM COATED ORAL at 16:31

## 2018-03-28 RX ADMIN — PRAVASTATIN SODIUM SCH MG: 20 TABLET ORAL at 08:30

## 2018-03-28 RX ADMIN — INSULIN ASPART SCH UNITS: 100 INJECTION, SOLUTION INTRAVENOUS; SUBCUTANEOUS at 07:59

## 2018-03-28 RX ADMIN — INSULIN ASPART SCH UNITS: 100 INJECTION, SOLUTION INTRAVENOUS; SUBCUTANEOUS at 17:19

## 2018-03-28 RX ADMIN — AMITRIPTYLINE HYDROCHLORIDE SCH MG: 50 TABLET, FILM COATED ORAL at 22:35

## 2018-03-28 RX ADMIN — CALCIUM ACETATE SCH MG: 667 CAPSULE ORAL at 08:28

## 2018-03-28 RX ADMIN — CALCIUM ACETATE SCH MG: 667 CAPSULE ORAL at 11:24

## 2018-03-28 RX ADMIN — TACROLIMUS SCH MG: 1 CAPSULE ORAL at 08:28

## 2018-03-28 RX ADMIN — CALCIUM ACETATE SCH MG: 667 CAPSULE ORAL at 17:14

## 2018-03-28 RX ADMIN — Medication SCH INTER.UNIT: at 08:27

## 2018-03-28 RX ADMIN — FERROUS SULFATE TAB EC 325 MG (65 MG FE EQUIVALENT) SCH MG: 325 (65 FE) TABLET DELAYED RESPONSE at 17:14

## 2018-03-28 NOTE — PHARMACY PROGRESS NOTE
Pharmacy Glycemic Short Note 2


Date of Service


Mar 28, 2018.





OUTPATIENT ANTIDIABETIC REGIMEN: 


* Lantus 35 units in the evening plus Novolog 12 with breakfast, 14 with lunch, 

and 16 with dinner











ASSESSMENT:


* Mr Lara is a 64 y/o M with a PMH of liver transplant, hepatitis B, 

hemodialysis MWF, and unknown control of type 2 diabetes (HbA1C is not reliable 

in this patient as he undergoes hemodialysis which can effect the results). He 

currently has 2 of 2 blood cultures positive for Gram positive cocci.


* During a previous hospitalization in December of last year, the patient 

consistently required 10 units of Lantus BID plus around 20-27 units of 

correctional insulin. 


* The patient's blood sugar yesterday was --216. He received 27 

units of insulin (18 units of Lantus) ... the patient's fasting blood sugar was 

65 mg/dL. Loosened Lantus again for tonight to 15 units. Tightened Novolog 

today as carbohydrates were clearly not covered well yesterday.








PLAN FOR INPATIENT GLYCEMIC CONTROL:





* Basal insulin


 * Lantus 15 units SQ qHS 





* Bolus insulin


 * NovoLog per scale ACHS or Q6hrs while NPO


 * Goal Range:  Low 110 mg/dL - High 140 mg/dL


 * Correction Factor:  20 mg/dL/unit


 * Nutritional / Prandial insulin per carb ratio of  1 unit per 10 grams CHO 

consumed








PLAN FOR DISCHARGE:


* The patient is a dialysis patient and therefore HbA1C is not an adequate 

indicator of glycemic control. Recommend checking blood sugars and making a log 

for outpatient adjustment of insulin.

## 2018-03-28 NOTE — PROGRESS NOTE
Subjective


Date of Service:


Mar 28, 2018.


Subjective


Pt evaluation today including:  conversation w/ patient, physical exam, lab 

review, review of studies, review of inpatient medication list


Saw/examined the patient in room 277


No problems with dialysis


Used the AV fistula, states that it felt different, but he had no issues with it


Denies chest pain/shortness of breath





Review of Systems


Respiratory:  No cough, No sputum, No shortness of breath


Cardiac:  No chest pain, No palpitations


Abdomen:  No pain, No nausea, No vomiting, No diarrhea


Heme:  No abnormal bleeding/bruising





Medications





Current Inpatient Medications








 Medications


  (Trade)  Dose


 Ordered  Sig/Basilia


 Route  Start Time


 Stop Time Status Last Admin


Dose Admin


 


 Heparin Sodium


  (Porcine)


  (Heparin Sq 5000


 Unit/0.5ml)  5,000 unit  Q8


 SQ  3/26/18 22:00


 4/25/18 21:59  3/27/18 22:06


5,000 UNIT


 


 Acetaminophen


  (Tylenol Tab)  650 mg  Q4H  PRN


 PO  3/26/18 14:30


 4/25/18 14:29   


 


 


 Insulin Aspart


  (novoLOG ASPART)  **SLIDING


 SCALE**


 **If C...  TID@1100,1630,2100


 SC  3/26/18 16:30


 4/25/18 16:29  3/27/18 22:06


3 UNITS


 


 Glucose


  (Glucose 40% Gel)  15-30


 GRAMS 15


 GRAMS...  UD  PRN


 PO  3/26/18 15:15


 4/25/18 15:14   


 


 


 Glucose


  (Glucose Chew


 Tab)  4-8


 Tablets 4


 Tabl...  UD  PRN


 PO  3/26/18 15:15


 4/25/18 15:14  3/26/18 17:31


4 TABS


 


 Dextrose


  (Dextrose 50%


 50ML Syringe)  25-50ML OF


 50% DW IV


 FOR...  UD  PRN


 IV  3/26/18 15:15


 4/25/18 15:14  3/27/18 07:57


50 ML


 


 Glucagon


  (Glucagon Inj)  1 mg  UD  PRN


 SQ  3/26/18 15:15


 4/25/18 15:14   


 


 


 Miscellaneous


 Information


  (Consult


 Glycemic


 Management


 Pharmacy)  1 ea  UD  PRN


 N/A  3/26/18 15:22


 4/25/18 15:21   


 


 


 Miscellaneous


 Information


  (Consult)  1 ea  UD  PRN


 N/A  3/26/18 15:30


 4/25/18 15:29   


 


 


 Allopurinol


  (Zyloprim Tab)  100 mg  DAILY


 PO  3/27/18 09:00


 4/26/18 08:59  3/28/18 08:28


100 MG


 


 Calcium Acetate


  (Phoslo Cap)  667 mg  TIDM


 PO  3/26/18 17:00


 4/25/18 17:59  3/28/18 08:28


667 MG


 


 Clopidogrel


 Bisulfate


  (plAVix TAB)  75 mg  DAILY


 PO  3/27/18 09:00


 4/26/18 08:59  3/28/18 16:31


75 MG


 


 Ferrous Sulfate


  (Feosol Tab)  325 mg  TIDM


 PO  3/26/18 17:00


 4/25/18 17:59  3/28/18 08:28


325 MG


 


 Pantoprazole


 Sodium


  (Protonix Tab)  40 mg  DAILY


 PO  3/27/18 09:00


 4/26/18 08:59  3/28/18 08:30


40 MG


 


 Pravastatin Sodium


  (Pravachol Tab)  20 mg  DAILY


 PO  3/27/18 09:00


 4/26/18 08:59  3/28/18 08:30


20 MG


 


 Tacrolimus


  (Prograf Cap)  4 mg  BID


 PO  3/26/18 21:00


 4/25/18 20:59  3/28/18 08:28


4 MG


 


 Amitriptyline HCl


  (Elavil Tab)  150 mg  HS


 PO  3/26/18 21:00


 4/25/18 20:59  3/27/18 22:07


150 MG


 


 Cholecalciferol


  (Vitamin D Tab)  2,000


 inter.unit  DAILY


 PO  3/27/18 09:00


 4/26/18 08:59  3/28/18 08:27


2,000 INTER.UNIT


 


 Insulin Aspart


  (novoLOG ASPART)  **SLIDING


 SCALE**


 **If C...  QDB


 SC  3/27/18 08:00


 4/26/18 07:59   


 


 


 Non-Formulary


 Medication


  (Non-Formulary


 Patient'S Own Med)  1 ea  DAILY


 PO  3/27/18 09:00


 4/26/18 08:59  3/28/18 08:30


1 EA


 


 Insulin Glargine


  (Lantus Solostar


 Pen)  15 units  HS


 SC  3/28/18 21:00


 4/27/18 20:59   


 











Objective


Vital Signs











  Date Time  Temp Pulse Resp B/P (MAP) Pulse Ox O2 Delivery O2 Flow Rate FiO2


 


3/28/18 14:34 36.6 69  121/66 (84)    


 


3/28/18 14:30  68  116/68    


 


3/28/18 14:15  64  118/70    


 


3/28/18 14:00  63  113/70    


 


3/28/18 13:45  62  116/69    


 


3/28/18 13:30  71  112/70    


 


3/28/18 13:15  71  112/72    


 


3/28/18 13:00  67  108/68    


 


3/28/18 12:45  71  116/68    


 


3/28/18 12:30  69  119/72    


 


3/28/18 12:15  73  112/71    


 


3/28/18 12:00  71  114/67    


 


3/28/18 11:45  67  112/73    


 


3/28/18 11:30  65  114/72    


 


3/28/18 11:15  66  115/73    


 


3/28/18 11:00  66  113/73    


 


3/28/18 10:45 36.6 66  117/73 (88)    


 


3/28/18 08:00     92 Room Air  


 


3/28/18 07:19 36.3 68 16 111/67 (82) 92 Room Air  


 


3/28/18 04:51      Room Air  


 


3/28/18 04:01 36.5 64 18 111/70 (84) 93 Room Air  


 


3/28/18 00:00      Room Air  


 


3/27/18 23:50 36.9 68 18 108/62 (77) 92 Room Air  


 


3/27/18 20:00      Room Air  


 


3/27/18 19:41 36.7 70 18 125/74 (91) 93 Room Air  











Physical Exam


General Appearance:  no apparent distress, + thin


Respiratory/Chest:  no respiratory distress, no accessory muscle use


Cardiovascular:  regular rate, rhythm, no edema, no murmur





Laboratory Results





Last 24 Hours








Test


  3/27/18


17:48 3/27/18


20:13 3/28/18


05:24 3/28/18


07:34


 


Troponin I < 0.015 ng/ml    


 


Bedside Glucose  216 mg/dl   60 mg/dl 


 


White Blood Count   7.46 K/uL  


 


Red Blood Count   3.83 M/uL  


 


Hemoglobin   12.1 g/dL  


 


Hematocrit   37.1 %  


 


Mean Corpuscular Volume   96.9 fL  


 


Mean Corpuscular Hemoglobin   31.6 pg  


 


Mean Corpuscular Hemoglobin


Concent 


  


  32.6 g/dl 


  


 


 


RDW Standard Deviation   47.6 fL  


 


RDW Coefficient of Variation   13.5 %  


 


Platelet Count   81 K/uL  


 


Mean Platelet Volume   9.5 fL  


 


Sodium Level   140 mmol/L  


 


Potassium Level   4.7 mmol/L  


 


Chloride Level   107 mmol/L  


 


Carbon Dioxide Level   21 mmol/L  


 


Anion Gap   11.0 mmol/L  


 


Blood Urea Nitrogen   73 mg/dl  


 


Creatinine   7.40 mg/dl  


 


Est Creatinine Clear Calc


Drug Dose 


  


  9.2 ml/min 


  


 


 


Estimated GFR (


American) 


  


  8.2 


  


 


 


Estimated GFR (Non-


American 


  


  7.0 


  


 


 


BUN/Creatinine Ratio   9.8  


 


Random Glucose   65 mg/dl  


 


Calcium Level   7.7 mg/dl  


 


Test


  3/28/18


07:58 3/28/18


09:40 3/28/18


10:49 3/28/18


16:22


 


Bedside Glucose 141 mg/dl   225 mg/dl  121 mg/dl 


 


Urine Color  YELLOW   


 


Urine Appearance  CLEAR   


 


Urine pH  8.0   


 


Urine Specific Gravity  1.015   


 


Urine Protein  2+   


 


Urine Glucose (UA)  2+   


 


Urine Ketones  NEG   


 


Urine Occult Blood  NEG   


 


Urine Nitrite  NEG   


 


Urine Bilirubin  NEG   


 


Urine Urobilinogen  NEG   


 


Urine Leukocyte Esterase  NEG   


 


Urine WBC (Auto)  1-5 /hpf   


 


Urine RBC (Auto)  0-4 /hpf   


 


Urine Hyaline Casts (Auto)  1-5 /lpf   


 


Urine Epithelial Cells (Auto)  5-10 /lpf   


 


Urine Bacteria (Auto)  NEG   











Assessment and Plan


This is a 63 year old male with a recent initiation of dialysis, AV fistula on 

R arm, not yet fully matured; insulin dependent DM, CAD, cerebral palsy





Gram Positive Bacteremia


3/28


- streptococcal species on blood cultures x2


- enterococcus species growing on the tip of the catheter; sensitivities pending


- continue Vanco for now





3/27


- patient admitted due to outpatient blood cultures being positive


- given IV Vanco


- tunneled dialysis catheter removed


- blood cultures here are also positive x2


- echo with no evidence of endocarditis


- further input as per ID





ESRD on HD


3/28


- s/p dialysis today


- AV fistula functioning





3/27


- appreciate vascular surgery input


- catheter removed, tip cultured


- can use mature AV fistula


- dialysis as per nephrology





Hx. of CAD


- troponins negative x3


- unlikely acute issue


- continue home medications





Uncontrolled DM2


- continue insulin


- appreciate pharmacy glycemic control





DVT ppx


- subq heparin





FULL CODE

## 2018-03-28 NOTE — PHARMACY PROGRESS NOTE
Pharmacy Abx Dose Short Note


Date of Service


Mar 28, 2018.





Assessment & Plan


3/29/18


Last nights post-HD random lvl came back at 13.1 mcg/mL. An appropriate one 

time dose of Vanco 750mg (~12mg/kg) was given at 2100. Resulting in an 

estimated peak of 30mcg/mL and slowly trending down from there. Given his low 

UO I expect him to not require a dose until 3/30/18. Will order a random pre-HD 

lvl for 3/30/18, I recommend ordering a post-HD lvl going into the weekend.








3/28/18


Mr. Lara's pre-HD lvl resulted at 15mcg/mL. I spoke with his nurse today and 

confirmed that HD was scheduled for this AM. Post HD lvl ordered for 1930 3/28/

18. I surmise that he's currently sitting around 26mcg/mL. I suspect his post-

HD lvl to be around 14mcg/mL; however, this is predicated on dialysis membrane/

length of HD.





He is growing GPC in 2/2 blood cx. Further, enterococcus sp found growing on 

perm cath. Hopefully can de-escalate to an amino-pcn in the future. 





If lvl: 


* <15 recommend 750mg IV X1


* 15.1-20 recommend 500mg IV x1


* >20 No Vanco





Pharmacy will continue to follow and will adjust dose/frequency as necessary.  

Thank you.

## 2018-03-28 NOTE — DIALYSIS PROGRESS NOTE
Nephrology Dialysis Note


Date of Service:


Mar 28, 2018.


Subjective


seen on dialysis about 1020; no f/c; bg better. no pain or sob; cxs w/ e 

faecalis; id following





Objective











  Date Time  Temp Pulse Resp B/P (MAP) Pulse Ox O2 Delivery O2 Flow Rate FiO2


 


3/28/18 12:15  73  112/71    


 


3/28/18 12:00  71  114/67    


 


3/28/18 11:45  67  112/73    


 


3/28/18 11:30  65  114/72    


 


3/28/18 11:15  66  115/73    


 


3/28/18 11:00  66  113/73    


 


3/28/18 10:45 36.6 66  117/73 (88)    


 


3/28/18 08:00     92 Room Air  


 


3/28/18 07:19 36.3 68 16 111/67 (82) 92 Room Air  


 


3/28/18 04:51      Room Air  


 


3/28/18 04:01 36.5 64 18 111/70 (84) 93 Room Air  


 


3/28/18 00:00      Room Air  


 


3/27/18 23:50 36.9 68 18 108/62 (77) 92 Room Air  


 


3/27/18 20:00      Room Air  


 


3/27/18 19:41 36.7 70 18 125/74 (91) 93 Room Air  


 


3/27/18 16:00      Room Air  


 


3/27/18 15:59 36.3 65 18 111/71 (84) 92 Room Air  


 


3/27/18 14:00 36.4 71 18 109/69 (82) 93 Room Air  








Physical Exam:


General Appearance:  no apparent distress, + cachetic (on ra lying flat)


Eyes:  EOMI


ENT:  hearing grossly normal, + nasal congestion


Respiratory/Chest:  no respiratory distress, + decreased breath sounds


Cardiovascular:  regular rate, rhythm, no edema


Abdomen:  normal bowel sounds, non tender, soft, + pertinent finding (no orozco)


Extremities:  no pedal edema, + pertinent finding (avf + t/b)


Neurologic/Psych:  alert, normal mood/affect, oriented x 3 (but some limits w/ 

history details)


Skin:  no jaundice, warm/dry, no rash





Current Inpatient Medications








 Medications


  (Trade)  Dose


 Ordered  Sig/Basilia


 Route  Start Time


 Stop Time Status Last Admin


Dose Admin


 


 Heparin Sodium


  (Porcine)


  (Heparin Sq 5000


 Unit/0.5ml)  5,000 unit  Q8


 SQ  3/26/18 22:00


 4/25/18 21:59  3/27/18 22:06


5,000 UNIT


 


 Acetaminophen


  (Tylenol Tab)  650 mg  Q4H  PRN


 PO  3/26/18 14:30


 4/25/18 14:29   


 


 


 Insulin Aspart


  (novoLOG ASPART)  **SLIDING


 SCALE**


 **If C...  TID@1100,1630,2100


 SC  3/26/18 16:30


 4/25/18 16:29  3/27/18 22:06


3 UNITS


 


 Glucose


  (Glucose 40% Gel)  15-30


 GRAMS 15


 GRAMS...  UD  PRN


 PO  3/26/18 15:15


 4/25/18 15:14   


 


 


 Glucose


  (Glucose Chew


 Tab)  4-8


 Tablets 4


 Tabl...  UD  PRN


 PO  3/26/18 15:15


 4/25/18 15:14  3/26/18 17:31


4 TABS


 


 Dextrose


  (Dextrose 50%


 50ML Syringe)  25-50ML OF


 50% DW IV


 FOR...  UD  PRN


 IV  3/26/18 15:15


 4/25/18 15:14  3/27/18 07:57


50 ML


 


 Glucagon


  (Glucagon Inj)  1 mg  UD  PRN


 SQ  3/26/18 15:15


 4/25/18 15:14   


 


 


 Miscellaneous


 Information


  (Consult


 Glycemic


 Management


 Pharmacy)  1 ea  UD  PRN


 N/A  3/26/18 15:22


 4/25/18 15:21   


 


 


 Miscellaneous


 Information


  (Consult)  1 ea  UD  PRN


 N/A  3/26/18 15:30


 4/25/18 15:29   


 


 


 Allopurinol


  (Zyloprim Tab)  100 mg  DAILY


 PO  3/27/18 09:00


 4/26/18 08:59  3/28/18 08:28


100 MG


 


 Calcium Acetate


  (Phoslo Cap)  667 mg  TIDM


 PO  3/26/18 17:00


 4/25/18 17:59  3/28/18 08:28


667 MG


 


 Clopidogrel


 Bisulfate


  (plAVix TAB)  75 mg  DAILY


 PO  3/27/18 09:00


 4/26/18 08:59  3/27/18 13:40


75 MG


 


 Ferrous Sulfate


  (Feosol Tab)  325 mg  TIDM


 PO  3/26/18 17:00


 4/25/18 17:59  3/28/18 08:28


325 MG


 


 Pantoprazole


 Sodium


  (Protonix Tab)  40 mg  DAILY


 PO  3/27/18 09:00


 4/26/18 08:59  3/28/18 08:30


40 MG


 


 Pravastatin Sodium


  (Pravachol Tab)  20 mg  DAILY


 PO  3/27/18 09:00


 4/26/18 08:59  3/28/18 08:30


20 MG


 


 Tacrolimus


  (Prograf Cap)  4 mg  BID


 PO  3/26/18 21:00


 4/25/18 20:59  3/28/18 08:28


4 MG


 


 Amitriptyline HCl


  (Elavil Tab)  150 mg  HS


 PO  3/26/18 21:00


 4/25/18 20:59  3/27/18 22:07


150 MG


 


 Cholecalciferol


  (Vitamin D Tab)  2,000


 inter.unit  DAILY


 PO  3/27/18 09:00


 4/26/18 08:59  3/28/18 08:27


2,000 INTER.UNIT


 


 Insulin Aspart


  (novoLOG ASPART)  **SLIDING


 SCALE**


 **If C...  QDB


 SC  3/27/18 08:00


 4/26/18 07:59   


 


 


 Non-Formulary


 Medication


  (Non-Formulary


 Patient'S Own Med)  1 ea  DAILY


 PO  3/27/18 09:00


 4/26/18 08:59  3/28/18 08:30


1 EA


 


 Insulin Glargine


  (Lantus Solostar


 Pen)  15 units  HS


 SC  3/28/18 21:00


 4/27/18 20:59   


 











Last 24 Hours








Test


  3/27/18


16:30 3/27/18


17:48 3/27/18


20:13 3/28/18


05:24


 


Bedside Glucose 137 mg/dl   216 mg/dl  


 


Troponin I  < 0.015 ng/ml   


 


White Blood Count    7.46 K/uL 


 


Red Blood Count    3.83 M/uL 


 


Hemoglobin    12.1 g/dL 


 


Hematocrit    37.1 % 


 


Mean Corpuscular Volume    96.9 fL 


 


Mean Corpuscular Hemoglobin    31.6 pg 


 


Mean Corpuscular Hemoglobin


Concent 


  


  


  32.6 g/dl 


 


 


RDW Standard Deviation    47.6 fL 


 


RDW Coefficient of Variation    13.5 % 


 


Platelet Count    81 K/uL 


 


Mean Platelet Volume    9.5 fL 


 


Sodium Level    140 mmol/L 


 


Potassium Level    4.7 mmol/L 


 


Chloride Level    107 mmol/L 


 


Carbon Dioxide Level    21 mmol/L 


 


Anion Gap    11.0 mmol/L 


 


Blood Urea Nitrogen    73 mg/dl 


 


Creatinine    7.40 mg/dl 


 


Est Creatinine Clear Calc


Drug Dose 


  


  


  9.2 ml/min 


 


 


Estimated GFR (


American) 


  


  


  8.2 


 


 


Estimated GFR (Non-


American 


  


  


  7.0 


 


 


BUN/Creatinine Ratio    9.8 


 


Random Glucose    65 mg/dl 


 


Calcium Level    7.7 mg/dl 


 


Test


  3/28/18


07:34 3/28/18


07:58 3/28/18


09:40 3/28/18


10:49


 


Bedside Glucose 60 mg/dl  141 mg/dl   225 mg/dl 


 


Urine Color   YELLOW  


 


Urine Appearance   CLEAR  


 


Urine pH   8.0  


 


Urine Specific Gravity   1.015  


 


Urine Protein   2+  


 


Urine Glucose (UA)   2+  


 


Urine Ketones   NEG  


 


Urine Occult Blood   NEG  


 


Urine Nitrite   NEG  


 


Urine Bilirubin   NEG  


 


Urine Urobilinogen   NEG  


 


Urine Leukocyte Esterase   NEG  


 


Urine WBC (Auto)   1-5 /hpf  


 


Urine RBC (Auto)   0-4 /hpf  


 


Urine Hyaline Casts (Auto)   1-5 /lpf  


 


Urine Epithelial Cells (Auto)   5-10 /lpf  


 


Urine Bacteria (Auto)   NEG  











Assessment & Plan


62 y/o M w/ ESRD on MWF HD via tunnelled line and hx of liver txplt, new onset 

diabetes after txplt, htn sent to ER d/t bacteremia w/ 4/4 blood culture 

bottles from 3/23 w/ e fecalis. Also w/ heel wound.





ESRD on HD


volume wise he is on RA w/ acceptable blood pressures.  chemistries, hgb are 

also acceptable


-will attempt gentle dialysis today using avf (first use of that access)


-given liver txplt hx/ immunosuppressed status, lower threshold than usual to 

remove tdc >  culture tip of cath ordered  (though he has already had abtx)





E faecalis bacteremia


-TDC out; using AVF for now (running ok so far but early in tx/use)


-inf dzs following >> sensis from outside cultures and cx results on paper chart


-on vanco currently





S/p Liver txplt


-continue routine tacro


-states he needs to be set up w/ liver txplt at d/c again; will facilitate





ECG changes


-d/w hospitalist and admitting team; pt w/o sx; f/u TTE





Appreciate consultation; will follow with you.

## 2018-03-29 VITALS
SYSTOLIC BLOOD PRESSURE: 141 MMHG | OXYGEN SATURATION: 94 % | HEART RATE: 62 BPM | DIASTOLIC BLOOD PRESSURE: 79 MMHG | TEMPERATURE: 98.24 F

## 2018-03-29 VITALS
TEMPERATURE: 98.06 F | SYSTOLIC BLOOD PRESSURE: 133 MMHG | DIASTOLIC BLOOD PRESSURE: 83 MMHG | OXYGEN SATURATION: 97 % | HEART RATE: 66 BPM

## 2018-03-29 VITALS
OXYGEN SATURATION: 95 % | TEMPERATURE: 97.52 F | SYSTOLIC BLOOD PRESSURE: 139 MMHG | DIASTOLIC BLOOD PRESSURE: 83 MMHG | HEART RATE: 66 BPM

## 2018-03-29 VITALS
TEMPERATURE: 97.7 F | OXYGEN SATURATION: 96 % | HEART RATE: 68 BPM | DIASTOLIC BLOOD PRESSURE: 77 MMHG | SYSTOLIC BLOOD PRESSURE: 130 MMHG

## 2018-03-29 VITALS
OXYGEN SATURATION: 94 % | SYSTOLIC BLOOD PRESSURE: 153 MMHG | HEART RATE: 62 BPM | DIASTOLIC BLOOD PRESSURE: 66 MMHG | TEMPERATURE: 98.24 F

## 2018-03-29 VITALS
OXYGEN SATURATION: 96 % | DIASTOLIC BLOOD PRESSURE: 68 MMHG | SYSTOLIC BLOOD PRESSURE: 117 MMHG | TEMPERATURE: 97.88 F | HEART RATE: 65 BPM

## 2018-03-29 LAB
BUN SERPL-MCNC: 41 MG/DL (ref 7–18)
CALCIUM SERPL-MCNC: 8 MG/DL (ref 8.5–10.1)
CO2 SERPL-SCNC: 21 MMOL/L (ref 21–32)
CREAT SERPL-MCNC: 5.33 MG/DL (ref 0.6–1.4)
EOSINOPHIL NFR BLD AUTO: 66 K/UL (ref 130–400)
GLUCOSE SERPL-MCNC: 98 MG/DL (ref 70–99)
HCT VFR BLD CALC: 39 % (ref 42–52)
HGB BLD-MCNC: 12.8 G/DL (ref 14–18)
MCH RBC QN AUTO: 31.8 PG (ref 25–34)
MCHC RBC AUTO-ENTMCNC: 32.8 G/DL (ref 32–36)
MCV RBC AUTO: 96.8 FL (ref 80–100)
PMV BLD AUTO: 9.4 FL (ref 7.4–10.4)
POTASSIUM SERPL-SCNC: 4.6 MMOL/L (ref 3.5–5.1)
RED CELL DISTRIBUTION WIDTH CV: 13.2 % (ref 11.5–14.5)
RED CELL DISTRIBUTION WIDTH SD: 45.9 FL (ref 36.4–46.3)
SODIUM SERPL-SCNC: 138 MMOL/L (ref 136–145)
WBC # BLD AUTO: 6.7 K/UL (ref 4.8–10.8)

## 2018-03-29 RX ADMIN — FERROUS SULFATE TAB EC 325 MG (65 MG FE EQUIVALENT) SCH MG: 325 (65 FE) TABLET DELAYED RESPONSE at 07:53

## 2018-03-29 RX ADMIN — CALCIUM ACETATE SCH MG: 667 CAPSULE ORAL at 07:53

## 2018-03-29 RX ADMIN — INSULIN ASPART SCH UNITS: 100 INJECTION, SOLUTION INTRAVENOUS; SUBCUTANEOUS at 21:00

## 2018-03-29 RX ADMIN — CALCIUM ACETATE SCH MG: 667 CAPSULE ORAL at 17:44

## 2018-03-29 RX ADMIN — HEPARIN SODIUM SCH UNIT: 10000 INJECTION, SOLUTION INTRAVENOUS; SUBCUTANEOUS at 14:10

## 2018-03-29 RX ADMIN — TACROLIMUS SCH MG: 1 CAPSULE ORAL at 21:37

## 2018-03-29 RX ADMIN — HEPARIN SODIUM SCH UNIT: 10000 INJECTION, SOLUTION INTRAVENOUS; SUBCUTANEOUS at 06:01

## 2018-03-29 RX ADMIN — AMITRIPTYLINE HYDROCHLORIDE SCH MG: 50 TABLET, FILM COATED ORAL at 21:37

## 2018-03-29 RX ADMIN — CLOPIDOGREL BISULFATE SCH MG: 75 TABLET, FILM COATED ORAL at 07:54

## 2018-03-29 RX ADMIN — HEPARIN SODIUM SCH UNIT: 10000 INJECTION, SOLUTION INTRAVENOUS; SUBCUTANEOUS at 21:40

## 2018-03-29 RX ADMIN — Medication SCH INTER.UNIT: at 07:53

## 2018-03-29 RX ADMIN — INSULIN ASPART SCH UNITS: 100 INJECTION, SOLUTION INTRAVENOUS; SUBCUTANEOUS at 08:02

## 2018-03-29 RX ADMIN — INSULIN ASPART SCH UNITS: 100 INJECTION, SOLUTION INTRAVENOUS; SUBCUTANEOUS at 17:46

## 2018-03-29 RX ADMIN — CALCIUM ACETATE SCH MG: 667 CAPSULE ORAL at 12:24

## 2018-03-29 RX ADMIN — PRAVASTATIN SODIUM SCH MG: 20 TABLET ORAL at 07:54

## 2018-03-29 RX ADMIN — PANTOPRAZOLE SCH MG: 40 TABLET, DELAYED RELEASE ORAL at 07:54

## 2018-03-29 RX ADMIN — TACROLIMUS SCH MG: 1 CAPSULE ORAL at 07:54

## 2018-03-29 RX ADMIN — ALLOPURINOL SCH MG: 100 TABLET ORAL at 07:54

## 2018-03-29 RX ADMIN — INSULIN ASPART SCH UNITS: 100 INJECTION, SOLUTION INTRAVENOUS; SUBCUTANEOUS at 12:32

## 2018-03-29 RX ADMIN — FERROUS SULFATE TAB EC 325 MG (65 MG FE EQUIVALENT) SCH MG: 325 (65 FE) TABLET DELAYED RESPONSE at 17:44

## 2018-03-29 RX ADMIN — FERROUS SULFATE TAB EC 325 MG (65 MG FE EQUIVALENT) SCH MG: 325 (65 FE) TABLET DELAYED RESPONSE at 12:24

## 2018-03-29 NOTE — INFECTIOUS DISEASE PROGRESS NT
Progress Note


Date of Service


Mar 29, 2018.





Subjective


Pt evaluation today including:  conversation w/ patient, physical exam, chart 

review, lab review, review of studies, conversation w/ consultant, review of 

inpatient medication list


Patient offers no new complaints today.  Remains afebrile.  Cultures growing 

Enterococcus.  Tolerating vancomycin without apparent difficulty.





   All Other Systems:  Reviewed and Negative





Medications





Current Inpatient Medications








 Medications


  (Trade)  Dose


 Ordered  Sig/Basilia


 Route  Start Time


 Stop Time Status Last Admin


Dose Admin


 


 Heparin Sodium


  (Porcine)


  (Heparin Sq 5000


 Unit/0.5ml)  5,000 unit  Q8


 SQ  3/26/18 22:00


 18 21:59  3/29/18 14:10


5,000 UNIT


 


 Acetaminophen


  (Tylenol Tab)  650 mg  Q4H  PRN


 PO  3/26/18 14:30


 18 14:29   


 


 


 Insulin Aspart


  (novoLOG ASPART)  **SLIDING


 SCALE**


 **If C...  TID@1100,1630,2100


 SC  3/26/18 16:30


 18 16:29  3/29/18 17:46


2 UNITS


 


 Glucose


  (Glucose 40% Gel)  15-30


 GRAMS 15


 GRAMS...  UD  PRN


 PO  3/26/18 15:15


 18 15:14   


 


 


 Glucose


  (Glucose Chew


 Tab)  4-8


 Tablets 4


 Tabl...  UD  PRN


 PO  3/26/18 15:15


 18 15:14  3/26/18 17:31


4 TABS


 


 Dextrose


  (Dextrose 50%


 50ML Syringe)  25-50ML OF


 50% DW IV


 FOR...  UD  PRN


 IV  3/26/18 15:15


 18 15:14  3/27/18 07:57


50 ML


 


 Glucagon


  (Glucagon Inj)  1 mg  UD  PRN


 SQ  3/26/18 15:15


 18 15:14   


 


 


 Miscellaneous


 Information


  (Consult


 Glycemic


 Management


 Pharmacy)  1 ea  UD  PRN


 N/A  3/26/18 15:22


 18 15:21   


 


 


 Miscellaneous


 Information


  (Consult)  1 ea  UD  PRN


 N/A  3/26/18 15:30


 18 15:29   


 


 


 Allopurinol


  (Zyloprim Tab)  100 mg  DAILY


 PO  3/27/18 09:00


 18 08:59  3/29/18 07:54


100 MG


 


 Calcium Acetate


  (Phoslo Cap)  667 mg  TIDM


 PO  3/26/18 17:00


 18 17:59  3/29/18 17:44


667 MG


 


 Clopidogrel


 Bisulfate


  (plAVix TAB)  75 mg  DAILY


 PO  3/27/18 09:00


 18 08:59  3/29/18 07:54


75 MG


 


 Ferrous Sulfate


  (Feosol Tab)  325 mg  TIDM


 PO  3/26/18 17:00


 18 17:59  3/29/18 17:44


325 MG


 


 Pantoprazole


 Sodium


  (Protonix Tab)  40 mg  DAILY


 PO  3/27/18 09:00


 18 08:59  3/29/18 07:54


40 MG


 


 Pravastatin Sodium


  (Pravachol Tab)  20 mg  DAILY


 PO  3/27/18 09:00


 18 08:59  3/29/18 07:54


20 MG


 


 Tacrolimus


  (Prograf Cap)  4 mg  BID


 PO  3/26/18 21:00


 18 20:59  3/29/18 07:54


4 MG


 


 Amitriptyline HCl


  (Elavil Tab)  150 mg  HS


 PO  3/26/18 21:00


 18 20:59  3/28/18 22:35


150 MG


 


 Cholecalciferol


  (Vitamin D Tab)  2,000


 inter.unit  DAILY


 PO  3/27/18 09:00


 18 08:59  3/29/18 07:53


2,000 INTER.UNIT


 


 Insulin Aspart


  (novoLOG ASPART)  **SLIDING


 SCALE**


 **If C...  QDB


 SC  3/27/18 08:00


 18 07:59  3/29/18 08:02


3 UNITS


 


 Non-Formulary


 Medication


  (Non-Formulary


 Patient'S Own Med)  1 ea  DAILY


 PO  3/27/18 09:00


 18 08:59  3/29/18 07:53


1 EA


 


 Insulin Glargine


  (Lantus Solostar


 Pen)  12 units  HS


 SC  3/29/18 21:00


 18 20:59   


 


 


 Heparin Sodium


  (Porcine)


  (Heparin Iv


 Bolus)  1,000 unit  0800


 IV  3/30/18 08:00


 3/30/18 12:00   


 


 


 Heparin Sodium


  (Porcine)


  (Heparin Iv


 Bolus)  400 unit  Q1H


 IV  3/30/18 08:00


 3/30/18 10:01   


 











Objective


Vital Signs











  Date Time  Temp Pulse Resp B/P (MAP) Pulse Ox O2 Delivery O2 Flow Rate FiO2


 


3/29/18 19:37 36.8 62 18 153/66 (95) 94 Room Air  


 


3/29/18 16:00      Room Air  


 


3/29/18 15:20 36.7 66 18 133/83 (100) 97 Room Air  


 


3/29/18 12:00      Room Air  


 


3/29/18 11:40 36.4 66 16 139/83 (101) 95   


 


3/29/18 08:00      Room Air  


 


3/29/18 07:23 36.5 68 16 130/77 (94) 96   


 


3/29/18 04:09 36.6 65 18 117/68 (84) 96 Room Air  


 


3/29/18 04:00      Room Air  


 


3/28/18 23:44      Room Air  


 


3/28/18 23:20 36.5 67 18 118/67 (84) 93 Room Air  











Physical Exam


General Appearance:  WD/WN, no apparent distress


Eyes:  normal inspection, EOMI, sclerae normal


ENT:  normal ENT inspection, hearing grossly normal, pharynx normal


Neck:  supple, no adenopathy, thyroid normal, trachea midline


Respiratory/Chest:  chest non-tender, lungs clear, normal breath sounds, no 

respiratory distress


Cardiovascular:  regular rate, rhythm, no gallop, no murmur


Abdomen:  normal bowel sounds, non tender, soft, no organomegaly


Extremities:  normal inspection, no calf tenderness, normal capillary refill


Neurologic/Psychiatric:  alert, normal mood/affect, oriented x 3


Skin:  normal color, warm/dry, no rash


Lymphatic:  no adenopathy





Laboratory Results





--------------------------------------------------------------------------------

------------





RUN DATE: 18                [facility]             

    PAGE 1   


RUN TIME: 1534                            Specimen Inquiry                    


--------------------------------------------------------------------------------

------------





PATIENT: KAREY GARCIA                 ACCT #: [id number] LOC:  [facility] #

: [id number]


                                       AGE/SX: 63/M         ROOM: 77       REG

: 18


REG DR:  Marycarmen Marquis DO         :    1955   BED:  2          DIS

:         


                                       STATUS: ADM IN       TLOC:           


--------------------------------------------------------------------------------

------------








SPEC #: [id number]        CHIKIS:      STATUS:  COMP           REQ 

#: [id number]


                            RECD:      SUBM DR: Klaus Benítez MD        


SOURCE: BLOOD               ENTR:      REBECCA DR: Gonzalo Rothman

D.DELISA       


SPDESC:                                                                        

            


ORDERED:  BLOOD CULTURE                                                        

   


--------------------------------------------------------------------------------

------------





  Procedure                         Result                         Verified    

       Site


--------------------------------------------------------------------------------

------------





  BLD CULT  Final                                                  


     Organism 1                     ENTEROCOCCUS FAECALIS


        SENS                        SENSITIVITY TO FOLLOW





        Phoned Positive Blood Culture Gram Stain Report to JOSE SOSA on 18 At 0157 By DELIA. Results were


        verbalized back to DELIA.


        





     1. ENTEROCOCCUS FAECALIS


                       Target Route Dose               RX     AB   Cost M.I.C. 

   IQ    


                       ------ ----- ------------------ ------ -- ------ --------

- ------


       AMPICILLIN                                      S                <=2    

         


       GENT SYNERGY                                    S                <=500  

         


       VANCOMYCIN                                      S                2      

         


       PENICILLIN                                      S                2      

         


       DAPTOMYCIN                                      S                1      

         


       STREP SYNERGY                                   S                <=1000 

         





      Streptomycin Synergy Screen S


      Gentamicin Synergy Screen S





         S = SENSITIVE  I = INTERMEDIATE  R = RESISTANT





--------------------------------------------------------------------------------

------------



































                                   ** END OF REPORT **     


Last 24 Hours








Test


  3/28/18


20:16 3/29/18


06:35 3/29/18


07:32 3/29/18


11:33


 


Bedside Glucose 194 mg/dl   81 mg/dl  114 mg/dl 


 


White Blood Count  6.70 K/uL   


 


Red Blood Count  4.03 M/uL   


 


Hemoglobin  12.8 g/dL   


 


Hematocrit  39.0 %   


 


Mean Corpuscular Volume  96.8 fL   


 


Mean Corpuscular Hemoglobin  31.8 pg   


 


Mean Corpuscular Hemoglobin


Concent 


  32.8 g/dl 


  


  


 


 


RDW Standard Deviation  45.9 fL   


 


RDW Coefficient of Variation  13.2 %   


 


Platelet Count  66 K/uL   


 


Mean Platelet Volume  9.4 fL   


 


Sodium Level  138 mmol/L   


 


Potassium Level  4.6 mmol/L   


 


Chloride Level  108 mmol/L   


 


Carbon Dioxide Level  21 mmol/L   


 


Anion Gap  10.0 mmol/L   


 


Blood Urea Nitrogen  41 mg/dl   


 


Creatinine  5.33 mg/dl   


 


Est Creatinine Clear Calc


Drug Dose 


  12.6 ml/min 


  


  


 


 


Estimated GFR (


American) 


  12.1 


  


  


 


 


Estimated GFR (Non-


American 


  10.5 


  


  


 


 


BUN/Creatinine Ratio  7.7   


 


Random Glucose  98 mg/dl   


 


Calcium Level  8.0 mg/dl   


 


Test


  3/29/18


16:16 


  


  


 


 


Bedside Glucose 105 mg/dl    











Assessment and Plan


63-year-old male with end-stage renal disease on dialysis now with enterococcal 

bacteremia from infected dialysis catheter now removed.  Patient should be 

continued on vancomycin which hopefully can be given with dialysis.  Would 

recommend 2 weeks of antibiotic therapy.

## 2018-03-29 NOTE — PROGRESS NOTE
Subjective


Date of Service:


Mar 29, 2018.


Subjective


Pt evaluation today including:  conversation w/ patient, physical exam, lab 

review, review of studies, conversation w/ consultant, review of inpatient 

medication list


Saw/examined the patient in room 277


He's doing fine, no complaints at this time





Review of Systems


Constitutional:  No fever, No chills


Respiratory:  No cough, No shortness of breath


Cardiac:  No chest pain, No edema, No palpitations





Medications





Current Inpatient Medications








 Medications


  (Trade)  Dose


 Ordered  Sig/Basilia


 Route  Start Time


 Stop Time Status Last Admin


Dose Admin


 


 Heparin Sodium


  (Porcine)


  (Heparin Sq 5000


 Unit/0.5ml)  5,000 unit  Q8


 SQ  3/26/18 22:00


 4/25/18 21:59  3/29/18 14:10


5,000 UNIT


 


 Acetaminophen


  (Tylenol Tab)  650 mg  Q4H  PRN


 PO  3/26/18 14:30


 4/25/18 14:29   


 


 


 Insulin Aspart


  (novoLOG ASPART)  **SLIDING


 SCALE**


 **If C...  TID@1100,1630,2100


 SC  3/26/18 16:30


 4/25/18 16:29  3/29/18 12:32


6 UNITS


 


 Glucose


  (Glucose 40% Gel)  15-30


 GRAMS 15


 GRAMS...  UD  PRN


 PO  3/26/18 15:15


 4/25/18 15:14   


 


 


 Glucose


  (Glucose Chew


 Tab)  4-8


 Tablets 4


 Tabl...  UD  PRN


 PO  3/26/18 15:15


 4/25/18 15:14  3/26/18 17:31


4 TABS


 


 Dextrose


  (Dextrose 50%


 50ML Syringe)  25-50ML OF


 50% DW IV


 FOR...  UD  PRN


 IV  3/26/18 15:15


 4/25/18 15:14  3/27/18 07:57


50 ML


 


 Glucagon


  (Glucagon Inj)  1 mg  UD  PRN


 SQ  3/26/18 15:15


 4/25/18 15:14   


 


 


 Miscellaneous


 Information


  (Consult


 Glycemic


 Management


 Pharmacy)  1 ea  UD  PRN


 N/A  3/26/18 15:22


 4/25/18 15:21   


 


 


 Miscellaneous


 Information


  (Consult)  1 ea  UD  PRN


 N/A  3/26/18 15:30


 4/25/18 15:29   


 


 


 Allopurinol


  (Zyloprim Tab)  100 mg  DAILY


 PO  3/27/18 09:00


 4/26/18 08:59  3/29/18 07:54


100 MG


 


 Calcium Acetate


  (Phoslo Cap)  667 mg  TIDM


 PO  3/26/18 17:00


 4/25/18 17:59  3/29/18 12:24


667 MG


 


 Clopidogrel


 Bisulfate


  (plAVix TAB)  75 mg  DAILY


 PO  3/27/18 09:00


 4/26/18 08:59  3/29/18 07:54


75 MG


 


 Ferrous Sulfate


  (Feosol Tab)  325 mg  TIDM


 PO  3/26/18 17:00


 4/25/18 17:59  3/29/18 12:24


325 MG


 


 Pantoprazole


 Sodium


  (Protonix Tab)  40 mg  DAILY


 PO  3/27/18 09:00


 4/26/18 08:59  3/29/18 07:54


40 MG


 


 Pravastatin Sodium


  (Pravachol Tab)  20 mg  DAILY


 PO  3/27/18 09:00


 4/26/18 08:59  3/29/18 07:54


20 MG


 


 Tacrolimus


  (Prograf Cap)  4 mg  BID


 PO  3/26/18 21:00


 4/25/18 20:59  3/29/18 07:54


4 MG


 


 Amitriptyline HCl


  (Elavil Tab)  150 mg  HS


 PO  3/26/18 21:00


 4/25/18 20:59  3/28/18 22:35


150 MG


 


 Cholecalciferol


  (Vitamin D Tab)  2,000


 inter.unit  DAILY


 PO  3/27/18 09:00


 4/26/18 08:59  3/29/18 07:53


2,000 INTER.UNIT


 


 Insulin Aspart


  (novoLOG ASPART)  **SLIDING


 SCALE**


 **If C...  QDB


 SC  3/27/18 08:00


 4/26/18 07:59  3/29/18 08:02


3 UNITS


 


 Non-Formulary


 Medication


  (Non-Formulary


 Patient'S Own Med)  1 ea  DAILY


 PO  3/27/18 09:00


 4/26/18 08:59  3/29/18 07:53


1 EA


 


 Insulin Glargine


  (Lantus Solostar


 Pen)  12 units  HS


 SC  3/29/18 21:00


 4/28/18 20:59   


 











Objective


Vital Signs











  Date Time  Temp Pulse Resp B/P (MAP) Pulse Ox O2 Delivery O2 Flow Rate FiO2


 


3/29/18 12:00      Room Air  


 


3/29/18 11:40 36.4 66 16 139/83 (101) 95   


 


3/29/18 08:00      Room Air  


 


3/29/18 07:23 36.5 68 16 130/77 (94) 96   


 


3/29/18 04:09 36.6 65 18 117/68 (84) 96 Room Air  


 


3/29/18 04:00      Room Air  


 


3/28/18 23:44      Room Air  


 


3/28/18 23:20 36.5 67 18 118/67 (84) 93 Room Air  


 


3/28/18 20:00      Room Air  


 


3/28/18 16:00      Room Air  


 


3/28/18 14:34 36.6 69  121/66 (84)    











Physical Exam


General Appearance:  no apparent distress, + thin


Respiratory/Chest:  lungs clear, normal breath sounds, no respiratory distress, 

no accessory muscle use


Cardiovascular:  regular rate, rhythm, no edema, no murmur


Extremities:  + pertinent finding (AV fistula on the R arm)


Neurologic/Psychiatric:  no motor/sensory deficits, alert, normal mood/affect





Laboratory Results





Last 24 Hours








Test


  3/28/18


16:22 3/28/18


19:25 3/28/18


20:16 3/29/18


06:35


 


Bedside Glucose 121 mg/dl   194 mg/dl  


 


Random Vancomycin Level  13.1 mcg/ml   


 


White Blood Count    6.70 K/uL 


 


Red Blood Count    4.03 M/uL 


 


Hemoglobin    12.8 g/dL 


 


Hematocrit    39.0 % 


 


Mean Corpuscular Volume    96.8 fL 


 


Mean Corpuscular Hemoglobin    31.8 pg 


 


Mean Corpuscular Hemoglobin


Concent 


  


  


  32.8 g/dl 


 


 


RDW Standard Deviation    45.9 fL 


 


RDW Coefficient of Variation    13.2 % 


 


Platelet Count    66 K/uL 


 


Mean Platelet Volume    9.4 fL 


 


Sodium Level    138 mmol/L 


 


Potassium Level    4.6 mmol/L 


 


Chloride Level    108 mmol/L 


 


Carbon Dioxide Level    21 mmol/L 


 


Anion Gap    10.0 mmol/L 


 


Blood Urea Nitrogen    41 mg/dl 


 


Creatinine    5.33 mg/dl 


 


Est Creatinine Clear Calc


Drug Dose 


  


  


  12.6 ml/min 


 


 


Estimated GFR (


American) 


  


  


  12.1 


 


 


Estimated GFR (Non-


American 


  


  


  10.5 


 


 


BUN/Creatinine Ratio    7.7 


 


Random Glucose    98 mg/dl 


 


Calcium Level    8.0 mg/dl 


 


Test


  3/29/18


07:32 3/29/18


11:33 


  


 


 


Bedside Glucose 81 mg/dl  114 mg/dl   











Assessment and Plan


This is a 63 year old male with a recent initiation of dialysis, AV fistula on 

R arm, not yet fully matured; insulin dependent DM, CAD, cerebral palsy





Enterococcal Bacteremia


3/29


- blood cultures positive x2 for streptococcal species and tip culture positive 

for enterococcus


- spoke with ID, vanco with dialysis x2 weeks


- repeat blood cultures ordered and pending, once negative, can d/c home





3/28


- streptococcal species on blood cultures x2


- enterococcus species growing on the tip of the catheter; sensitivities pending


- continue Vanco for now





3/27


- patient admitted due to outpatient blood cultures being positive


- given IV Vanco


- tunneled dialysis catheter removed


- blood cultures here are also positive x2


- echo with no evidence of endocarditis


- further input as per ID





ESRD on HD


3/29


- dialysis on 3/30





3/28


- s/p dialysis today


- AV fistula functioning





3/27


- appreciate vascular surgery input


- catheter removed, tip cultured


- can use mature AV fistula


- dialysis as per nephrology





Hx. of CAD


- troponins negative x3


- unlikely acute issue


- continue home medications





Uncontrolled DM2


- continue insulin


- appreciate pharmacy glycemic control





DVT ppx


- subq heparin





FULL CODE

## 2018-03-29 NOTE — NEPHROLOGY PROGRESS NOTE
Nephrology Progress Note


Date of Service:


Mar 29, 2018.


Subjective


no f/c; bg better. no pain or sob; cxs w/ e faecalis; id following





Objective











  Date Time  Temp Pulse Resp B/P (MAP) Pulse Ox O2 Delivery O2 Flow Rate FiO2


 


3/29/18 08:00      Room Air  


 


3/29/18 07:23 36.5 68 16 130/77 (94) 96   


 


3/29/18 04:09 36.6 65 18 117/68 (84) 96 Room Air  


 


3/29/18 04:00      Room Air  


 


3/28/18 23:44      Room Air  


 


3/28/18 23:20 36.5 67 18 118/67 (84) 93 Room Air  


 


3/28/18 20:00      Room Air  


 


3/28/18 16:00      Room Air  


 


3/28/18 14:34 36.6 69  121/66 (84)    


 


3/28/18 14:30  68  116/68    


 


3/28/18 14:15  64  118/70    


 


3/28/18 14:00  63  113/70    


 


3/28/18 13:45  62  116/69    


 


3/28/18 13:30  71  112/70    


 


3/28/18 13:15  71  112/72    


 


3/28/18 13:00  67  108/68    


 


3/28/18 12:45  71  116/68    


 


3/28/18 12:30  69  119/72    


 


3/28/18 12:15  73  112/71    


 


3/28/18 12:00  71  114/67    


 


3/28/18 11:45  67  112/73    


 


3/28/18 11:30  65  114/72    


 


3/28/18 11:15  66  115/73    


 


3/28/18 11:00  66  113/73    


 


3/28/18 10:45 36.6 66  117/73 (88)    








Physical Exam:


General Appearance:  no apparent distress, + cachetic (on ra lying flat)


Eyes:  EOMI


ENT:  hearing grossly normal, + nasal congestion


Respiratory/Chest:  no respiratory distress, + decreased breath sounds


Cardiovascular:  regular rate, rhythm, no edema


Abdomen:  normal bowel sounds, non tender, soft, + pertinent finding (no orozco)


Extremities:  no pedal edema, + pertinent finding (avf + t/b)


Neurologic/Psych:  alert, normal mood/affect, oriented x 3


Skin:  no jaundice, warm/dry, no rash





Current Inpatient Medications








 Medications


  (Trade)  Dose


 Ordered  Sig/Basilia


 Route  Start Time


 Stop Time Status Last Admin


Dose Admin


 


 Heparin Sodium


  (Porcine)


  (Heparin Sq 5000


 Unit/0.5ml)  5,000 unit  Q8


 SQ  3/26/18 22:00


 4/25/18 21:59  3/29/18 06:01


5,000 UNIT


 


 Acetaminophen


  (Tylenol Tab)  650 mg  Q4H  PRN


 PO  3/26/18 14:30


 4/25/18 14:29   


 


 


 Insulin Aspart


  (novoLOG ASPART)  **SLIDING


 SCALE**


 **If C...  TID@1100,1630,2100


 SC  3/26/18 16:30


 4/25/18 16:29  3/28/18 22:39


4 UNITS


 


 Glucose


  (Glucose 40% Gel)  15-30


 GRAMS 15


 GRAMS...  UD  PRN


 PO  3/26/18 15:15


 4/25/18 15:14   


 


 


 Glucose


  (Glucose Chew


 Tab)  4-8


 Tablets 4


 Tabl...  UD  PRN


 PO  3/26/18 15:15


 4/25/18 15:14  3/26/18 17:31


4 TABS


 


 Dextrose


  (Dextrose 50%


 50ML Syringe)  25-50ML OF


 50% DW IV


 FOR...  UD  PRN


 IV  3/26/18 15:15


 4/25/18 15:14  3/27/18 07:57


50 ML


 


 Glucagon


  (Glucagon Inj)  1 mg  UD  PRN


 SQ  3/26/18 15:15


 4/25/18 15:14   


 


 


 Miscellaneous


 Information


  (Consult


 Glycemic


 Management


 Pharmacy)  1 ea  UD  PRN


 N/A  3/26/18 15:22


 4/25/18 15:21   


 


 


 Miscellaneous


 Information


  (Consult)  1 ea  UD  PRN


 N/A  3/26/18 15:30


 4/25/18 15:29   


 


 


 Allopurinol


  (Zyloprim Tab)  100 mg  DAILY


 PO  3/27/18 09:00


 4/26/18 08:59  3/29/18 07:54


100 MG


 


 Calcium Acetate


  (Phoslo Cap)  667 mg  TIDM


 PO  3/26/18 17:00


 4/25/18 17:59  3/29/18 07:53


667 MG


 


 Clopidogrel


 Bisulfate


  (plAVix TAB)  75 mg  DAILY


 PO  3/27/18 09:00


 4/26/18 08:59  3/29/18 07:54


75 MG


 


 Ferrous Sulfate


  (Feosol Tab)  325 mg  TIDM


 PO  3/26/18 17:00


 4/25/18 17:59  3/29/18 07:53


325 MG


 


 Pantoprazole


 Sodium


  (Protonix Tab)  40 mg  DAILY


 PO  3/27/18 09:00


 4/26/18 08:59  3/29/18 07:54


40 MG


 


 Pravastatin Sodium


  (Pravachol Tab)  20 mg  DAILY


 PO  3/27/18 09:00


 4/26/18 08:59  3/29/18 07:54


20 MG


 


 Tacrolimus


  (Prograf Cap)  4 mg  BID


 PO  3/26/18 21:00


 4/25/18 20:59  3/29/18 07:54


4 MG


 


 Amitriptyline HCl


  (Elavil Tab)  150 mg  HS


 PO  3/26/18 21:00


 4/25/18 20:59  3/28/18 22:35


150 MG


 


 Cholecalciferol


  (Vitamin D Tab)  2,000


 inter.unit  DAILY


 PO  3/27/18 09:00


 4/26/18 08:59  3/29/18 07:53


2,000 INTER.UNIT


 


 Insulin Aspart


  (novoLOG ASPART)  **SLIDING


 SCALE**


 **If C...  QDB


 SC  3/27/18 08:00


 4/26/18 07:59  3/29/18 08:02


3 UNITS


 


 Non-Formulary


 Medication


  (Non-Formulary


 Patient'S Own Med)  1 ea  DAILY


 PO  3/27/18 09:00


 4/26/18 08:59  3/29/18 07:53


1 EA


 


 Insulin Glargine


  (Lantus Solostar


 Pen)  15 units  HS


 SC  3/28/18 21:00


 4/27/18 20:59  3/28/18 22:38


15 UNITS











Last 24 Hours








Test


  3/28/18


09:40 3/28/18


10:49 3/28/18


16:22 3/28/18


19:25


 


Urine Color YELLOW    


 


Urine Appearance CLEAR    


 


Urine pH 8.0    


 


Urine Specific Gravity 1.015    


 


Urine Protein 2+    


 


Urine Glucose (UA) 2+    


 


Urine Ketones NEG    


 


Urine Occult Blood NEG    


 


Urine Nitrite NEG    


 


Urine Bilirubin NEG    


 


Urine Urobilinogen NEG    


 


Urine Leukocyte Esterase NEG    


 


Urine WBC (Auto) 1-5 /hpf    


 


Urine RBC (Auto) 0-4 /hpf    


 


Urine Hyaline Casts (Auto) 1-5 /lpf    


 


Urine Epithelial Cells (Auto) 5-10 /lpf    


 


Urine Bacteria (Auto) NEG    


 


Bedside Glucose  225 mg/dl  121 mg/dl  


 


Random Vancomycin Level    13.1 mcg/ml 


 


Test


  3/28/18


20:16 3/29/18


06:35 3/29/18


07:32 


 


 


Bedside Glucose 194 mg/dl   81 mg/dl  


 


White Blood Count  6.70 K/uL   


 


Red Blood Count  4.03 M/uL   


 


Hemoglobin  12.8 g/dL   


 


Hematocrit  39.0 %   


 


Mean Corpuscular Volume  96.8 fL   


 


Mean Corpuscular Hemoglobin  31.8 pg   


 


Mean Corpuscular Hemoglobin


Concent 


  32.8 g/dl 


  


  


 


 


RDW Standard Deviation  45.9 fL   


 


RDW Coefficient of Variation  13.2 %   


 


Platelet Count  66 K/uL   


 


Mean Platelet Volume  9.4 fL   


 


Sodium Level  138 mmol/L   


 


Potassium Level  4.6 mmol/L   


 


Chloride Level  108 mmol/L   


 


Carbon Dioxide Level  21 mmol/L   


 


Anion Gap  10.0 mmol/L   


 


Blood Urea Nitrogen  41 mg/dl   


 


Creatinine  5.33 mg/dl   


 


Est Creatinine Clear Calc


Drug Dose 


  12.6 ml/min 


  


  


 


 


Estimated GFR (


American) 


  12.1 


  


  


 


 


Estimated GFR (Non-


American 


  10.5 


  


  


 


 


BUN/Creatinine Ratio  7.7   


 


Random Glucose  98 mg/dl   


 


Calcium Level  8.0 mg/dl   














 Date/Time


Source Procedure


Growth Status


 


 


 3/29/18 08:24


Blood Blood Culture


Pending Received


 


 3/29/18 08:15


Blood Blood Culture


Pending Received











Assessment & Plan


62 y/o M w/ ESRD on MWF HD via tunnelled line and hx of liver txplt, new onset 

diabetes after txplt, htn sent to ER d/t bacteremia w/ 4/4 blood culture 

bottles from 3/23 w/ e fecalis. Also w/ heel wound.





ESRD on HD


volume wise he is on RA w/ acceptable blood pressures.  chemistries, hgb are 

also acceptable


-will attempt gentle dialysis tomorrow using avf


-given liver txplt hx/ immunosuppressed status, lower threshold than usual to 

remove tdc >  culture tip of cath ordered and + for E faecalis





E faecalis bacteremia


-TDC out; using AVF for now and ran well for first tx


-ordered repeat cxs today to document whether we're clearing bacteria


-inf dzs following >> sensis from outside cultures and cx results on paper chart


-on vanco currently> level on low side today





S/p Liver txplt


-continue routine tacro


-states he needs to be set up w/ liver txplt at d/c again; will facilitate





ECG changes > resolved on f/u ecg from junctional > NSR


-d/w hospitalist and admitting team; pt w/o sx; TTE reassuring as is f/u ECG





Appreciate consultation; will follow with you.  care coordinated w/ dr brothers

## 2018-03-30 VITALS — DIASTOLIC BLOOD PRESSURE: 84 MMHG | HEART RATE: 71 BPM | SYSTOLIC BLOOD PRESSURE: 131 MMHG

## 2018-03-30 VITALS
OXYGEN SATURATION: 95 % | HEART RATE: 70 BPM | TEMPERATURE: 96.44 F | SYSTOLIC BLOOD PRESSURE: 130 MMHG | DIASTOLIC BLOOD PRESSURE: 73 MMHG

## 2018-03-30 VITALS — TEMPERATURE: 98.78 F | DIASTOLIC BLOOD PRESSURE: 82 MMHG | HEART RATE: 75 BPM | SYSTOLIC BLOOD PRESSURE: 135 MMHG

## 2018-03-30 VITALS
TEMPERATURE: 97.88 F | SYSTOLIC BLOOD PRESSURE: 120 MMHG | HEART RATE: 65 BPM | OXYGEN SATURATION: 94 % | DIASTOLIC BLOOD PRESSURE: 75 MMHG

## 2018-03-30 VITALS — DIASTOLIC BLOOD PRESSURE: 71 MMHG | HEART RATE: 74 BPM | SYSTOLIC BLOOD PRESSURE: 114 MMHG

## 2018-03-30 VITALS — HEART RATE: 70 BPM | SYSTOLIC BLOOD PRESSURE: 133 MMHG | DIASTOLIC BLOOD PRESSURE: 78 MMHG

## 2018-03-30 VITALS — DIASTOLIC BLOOD PRESSURE: 76 MMHG | HEART RATE: 68 BPM | TEMPERATURE: 97.88 F | SYSTOLIC BLOOD PRESSURE: 123 MMHG

## 2018-03-30 VITALS — DIASTOLIC BLOOD PRESSURE: 76 MMHG | HEART RATE: 71 BPM | SYSTOLIC BLOOD PRESSURE: 125 MMHG

## 2018-03-30 VITALS — DIASTOLIC BLOOD PRESSURE: 87 MMHG | HEART RATE: 74 BPM | SYSTOLIC BLOOD PRESSURE: 131 MMHG

## 2018-03-30 VITALS
TEMPERATURE: 97.34 F | DIASTOLIC BLOOD PRESSURE: 78 MMHG | SYSTOLIC BLOOD PRESSURE: 136 MMHG | OXYGEN SATURATION: 96 % | HEART RATE: 70 BPM

## 2018-03-30 VITALS
OXYGEN SATURATION: 93 % | HEART RATE: 70 BPM | TEMPERATURE: 97.88 F | SYSTOLIC BLOOD PRESSURE: 138 MMHG | DIASTOLIC BLOOD PRESSURE: 83 MMHG

## 2018-03-30 VITALS — SYSTOLIC BLOOD PRESSURE: 125 MMHG | HEART RATE: 71 BPM | DIASTOLIC BLOOD PRESSURE: 76 MMHG

## 2018-03-30 VITALS — DIASTOLIC BLOOD PRESSURE: 81 MMHG | SYSTOLIC BLOOD PRESSURE: 125 MMHG | HEART RATE: 73 BPM

## 2018-03-30 VITALS — HEART RATE: 72 BPM | SYSTOLIC BLOOD PRESSURE: 129 MMHG | DIASTOLIC BLOOD PRESSURE: 79 MMHG

## 2018-03-30 VITALS — DIASTOLIC BLOOD PRESSURE: 82 MMHG | SYSTOLIC BLOOD PRESSURE: 130 MMHG | HEART RATE: 69 BPM

## 2018-03-30 VITALS — SYSTOLIC BLOOD PRESSURE: 125 MMHG | HEART RATE: 75 BPM | DIASTOLIC BLOOD PRESSURE: 79 MMHG

## 2018-03-30 VITALS — DIASTOLIC BLOOD PRESSURE: 83 MMHG | HEART RATE: 68 BPM | SYSTOLIC BLOOD PRESSURE: 132 MMHG

## 2018-03-30 VITALS — DIASTOLIC BLOOD PRESSURE: 74 MMHG | SYSTOLIC BLOOD PRESSURE: 121 MMHG | HEART RATE: 76 BPM

## 2018-03-30 VITALS
HEART RATE: 70 BPM | DIASTOLIC BLOOD PRESSURE: 79 MMHG | TEMPERATURE: 98.06 F | SYSTOLIC BLOOD PRESSURE: 146 MMHG | OXYGEN SATURATION: 95 %

## 2018-03-30 VITALS — HEART RATE: 74 BPM | DIASTOLIC BLOOD PRESSURE: 80 MMHG | SYSTOLIC BLOOD PRESSURE: 129 MMHG

## 2018-03-30 VITALS — DIASTOLIC BLOOD PRESSURE: 77 MMHG | HEART RATE: 72 BPM | SYSTOLIC BLOOD PRESSURE: 122 MMHG

## 2018-03-30 VITALS — OXYGEN SATURATION: 95 %

## 2018-03-30 LAB
BUN SERPL-MCNC: 52 MG/DL (ref 7–18)
CALCIUM SERPL-MCNC: 8.5 MG/DL (ref 8.5–10.1)
CO2 SERPL-SCNC: 21 MMOL/L (ref 21–32)
CREAT SERPL-MCNC: 6.19 MG/DL (ref 0.6–1.4)
EOSINOPHIL NFR BLD AUTO: 65 K/UL (ref 130–400)
GLUCOSE SERPL-MCNC: 92 MG/DL (ref 70–99)
HCT VFR BLD CALC: 37.8 % (ref 42–52)
HGB BLD-MCNC: 12.7 G/DL (ref 14–18)
MCH RBC QN AUTO: 32.4 PG (ref 25–34)
MCHC RBC AUTO-ENTMCNC: 33.6 G/DL (ref 32–36)
MCV RBC AUTO: 96.4 FL (ref 80–100)
PMV BLD AUTO: 9 FL (ref 7.4–10.4)
POTASSIUM SERPL-SCNC: 4.7 MMOL/L (ref 3.5–5.1)
RED CELL DISTRIBUTION WIDTH CV: 13.2 % (ref 11.5–14.5)
RED CELL DISTRIBUTION WIDTH SD: 45.8 FL (ref 36.4–46.3)
SODIUM SERPL-SCNC: 139 MMOL/L (ref 136–145)
WBC # BLD AUTO: 7.28 K/UL (ref 4.8–10.8)

## 2018-03-30 RX ADMIN — HEPARIN SODIUM SCH UNIT: 1000 INJECTION, SOLUTION INTRAVENOUS; SUBCUTANEOUS at 10:00

## 2018-03-30 RX ADMIN — INSULIN ASPART SCH UNITS: 100 INJECTION, SOLUTION INTRAVENOUS; SUBCUTANEOUS at 07:50

## 2018-03-30 RX ADMIN — ALLOPURINOL SCH MG: 100 TABLET ORAL at 07:44

## 2018-03-30 RX ADMIN — FERROUS SULFATE TAB EC 325 MG (65 MG FE EQUIVALENT) SCH MG: 325 (65 FE) TABLET DELAYED RESPONSE at 07:42

## 2018-03-30 RX ADMIN — HEPARIN SODIUM SCH UNIT: 10000 INJECTION, SOLUTION INTRAVENOUS; SUBCUTANEOUS at 14:08

## 2018-03-30 RX ADMIN — PANTOPRAZOLE SCH MG: 40 TABLET, DELAYED RELEASE ORAL at 07:43

## 2018-03-30 RX ADMIN — CALCIUM ACETATE SCH MG: 667 CAPSULE ORAL at 07:43

## 2018-03-30 RX ADMIN — HEPARIN SODIUM SCH UNIT: 1000 INJECTION, SOLUTION INTRAVENOUS; SUBCUTANEOUS at 13:30

## 2018-03-30 RX ADMIN — INSULIN ASPART SCH UNITS: 100 INJECTION, SOLUTION INTRAVENOUS; SUBCUTANEOUS at 18:16

## 2018-03-30 RX ADMIN — HEPARIN SODIUM SCH UNIT: 10000 INJECTION, SOLUTION INTRAVENOUS; SUBCUTANEOUS at 05:49

## 2018-03-30 RX ADMIN — PRAVASTATIN SODIUM SCH MG: 20 TABLET ORAL at 07:41

## 2018-03-30 RX ADMIN — CALCIUM ACETATE SCH MG: 667 CAPSULE ORAL at 18:16

## 2018-03-30 RX ADMIN — CLOPIDOGREL BISULFATE SCH MG: 75 TABLET, FILM COATED ORAL at 07:43

## 2018-03-30 RX ADMIN — INSULIN GLARGINE SCH UNITS: 100 INJECTION, SOLUTION SUBCUTANEOUS at 21:05

## 2018-03-30 RX ADMIN — INSULIN ASPART SCH UNITS: 100 INJECTION, SOLUTION INTRAVENOUS; SUBCUTANEOUS at 12:19

## 2018-03-30 RX ADMIN — TACROLIMUS SCH MG: 1 CAPSULE ORAL at 07:44

## 2018-03-30 RX ADMIN — INSULIN ASPART SCH UNITS: 100 INJECTION, SOLUTION INTRAVENOUS; SUBCUTANEOUS at 21:03

## 2018-03-30 RX ADMIN — FERROUS SULFATE TAB EC 325 MG (65 MG FE EQUIVALENT) SCH MG: 325 (65 FE) TABLET DELAYED RESPONSE at 18:17

## 2018-03-30 RX ADMIN — HEPARIN SODIUM SCH UNIT: 10000 INJECTION, SOLUTION INTRAVENOUS; SUBCUTANEOUS at 21:05

## 2018-03-30 RX ADMIN — CALCIUM ACETATE SCH MG: 667 CAPSULE ORAL at 14:05

## 2018-03-30 RX ADMIN — FERROUS SULFATE TAB EC 325 MG (65 MG FE EQUIVALENT) SCH MG: 325 (65 FE) TABLET DELAYED RESPONSE at 14:06

## 2018-03-30 RX ADMIN — TACROLIMUS SCH MG: 1 CAPSULE ORAL at 21:07

## 2018-03-30 RX ADMIN — HEPARIN SODIUM SCH UNIT: 1000 INJECTION, SOLUTION INTRAVENOUS; SUBCUTANEOUS at 11:00

## 2018-03-30 RX ADMIN — AMITRIPTYLINE HYDROCHLORIDE SCH MG: 50 TABLET, FILM COATED ORAL at 21:06

## 2018-03-30 RX ADMIN — Medication SCH INTER.UNIT: at 07:44

## 2018-03-30 RX ADMIN — INSULIN ASPART SCH UNITS: 100 INJECTION, SOLUTION INTRAVENOUS; SUBCUTANEOUS at 08:00

## 2018-03-30 NOTE — INFECTIOUS DISEASE PROGRESS NT
Progress Note


Date of Service


Mar 30, 2018.





Subjective


Pt evaluation today including:  conversation w/ patient, physical exam, chart 

review, lab review, review of studies, conversation w/ consultant, review of 

inpatient medication list


Patient offers no new complaints today.  Remains afebrile.  Tolerating 

vancomycin without apparent difficulty.





   All Other Systems:  Reviewed and Negative





Medications





Current Inpatient Medications








 Medications


  (Trade)  Dose


 Ordered  Sig/Basilia


 Route  Start Time


 Stop Time Status Last Admin


Dose Admin


 


 Heparin Sodium


  (Porcine)


  (Heparin Sq 5000


 Unit/0.5ml)  5,000 unit  Q8


 SQ  3/26/18 22:00


 18 21:59  3/30/18 14:08


5,000 UNIT


 


 Acetaminophen


  (Tylenol Tab)  650 mg  Q4H  PRN


 PO  3/26/18 14:30


 18 14:29   


 


 


 Insulin Aspart


  (novoLOG ASPART)  **SLIDING


 SCALE**


 **If C...  TID@1100,1630,2100


 SC  3/26/18 16:30


 18 16:29  3/29/18 17:46


2 UNITS


 


 Glucose


  (Glucose 40% Gel)  15-30


 GRAMS 15


 GRAMS...  UD  PRN


 PO  3/26/18 15:15


 18 15:14   


 


 


 Glucose


  (Glucose Chew


 Tab)  4-8


 Tablets 4


 Tabl...  UD  PRN


 PO  3/26/18 15:15


 18 15:14  3/26/18 17:31


4 TABS


 


 Dextrose


  (Dextrose 50%


 50ML Syringe)  25-50ML OF


 50% DW IV


 FOR...  UD  PRN


 IV  3/26/18 15:15


 18 15:14  3/27/18 07:57


50 ML


 


 Glucagon


  (Glucagon Inj)  1 mg  UD  PRN


 SQ  3/26/18 15:15


 18 15:14   


 


 


 Miscellaneous


 Information


  (Consult


 Glycemic


 Management


 Pharmacy)  1 ea  UD  PRN


 N/A  3/26/18 15:22


 18 15:21   


 


 


 Miscellaneous


 Information


  (Consult)  1 ea  UD  PRN


 N/A  3/26/18 15:30


 18 15:29   


 


 


 Allopurinol


  (Zyloprim Tab)  100 mg  DAILY


 PO  3/27/18 09:00


 18 08:59  3/30/18 07:44


100 MG


 


 Calcium Acetate


  (Phoslo Cap)  667 mg  TIDM


 PO  3/26/18 17:00


 18 17:59  3/30/18 14:05


667 MG


 


 Clopidogrel


 Bisulfate


  (plAVix TAB)  75 mg  DAILY


 PO  3/27/18 09:00


 18 08:59  3/30/18 07:43


75 MG


 


 Ferrous Sulfate


  (Feosol Tab)  325 mg  TIDM


 PO  3/26/18 17:00


 18 17:59  3/30/18 14:06


325 MG


 


 Pantoprazole


 Sodium


  (Protonix Tab)  40 mg  DAILY


 PO  3/27/18 09:00


 18 08:59  3/30/18 07:43


40 MG


 


 Pravastatin Sodium


  (Pravachol Tab)  20 mg  DAILY


 PO  3/27/18 09:00


 18 08:59  3/30/18 07:41


20 MG


 


 Tacrolimus


  (Prograf Cap)  4 mg  BID


 PO  3/26/18 21:00


 18 20:59  3/30/18 07:44


4 MG


 


 Amitriptyline HCl


  (Elavil Tab)  150 mg  HS


 PO  3/26/18 21:00


 18 20:59  3/29/18 21:37


150 MG


 


 Cholecalciferol


  (Vitamin D Tab)  2,000


 inter.unit  DAILY


 PO  3/27/18 09:00


 18 08:59  3/30/18 07:44


2,000 INTER.UNIT


 


 Insulin Aspart


  (novoLOG ASPART)  **SLIDING


 SCALE**


 **If C...  QDB


 SC  3/27/18 08:00


 18 07:59  3/29/18 08:02


3 UNITS


 


 Non-Formulary


 Medication


  (Non-Formulary


 Patient'S Own Med)  1 ea  DAILY


 PO  3/27/18 09:00


 18 08:59  3/30/18 07:46


1 EA


 


 Insulin Glargine


  (Lantus Solostar


 Pen)  10 units  HS


 SC  3/30/18 21:00


 18 20:59   


 


 


 Vancomycin HCl


 500 mg/Sodium


 Chloride  110 ml @ 


 110 mls/hr  TODAY@2000  ONCE


 IV  3/30/18 20:00


 3/30/18 20:59   


 


 


 Ondansetron HCl


  (Zofran Inj)  4 mg  Q6H  PRN


 IV  3/30/18 10:00


 4/29/18 09:59   


 











Objective


Vital Signs











  Date Time  Temp Pulse Resp B/P (MAP) Pulse Ox O2 Delivery O2 Flow Rate FiO2


 


3/30/18 16:00      Room Air  


 


3/30/18 15:46 35.8 70 18 130/73 (92) 95 Room Air  


 


3/30/18 13:30 36.7 70 18 146/79 (101) 95 Room Air  


 


3/30/18 12:50 37.1 75  135/82 (99)    


 


3/30/18 12:30  76  121/74    


 


3/30/18 12:15  74  114/71    


 


3/30/18 12:00  75  125/79    


 


3/30/18 11:45  74  129/80    


 


3/30/18 11:30  73  125/81    


 


3/30/18 11:15  72  129/79    


 


3/30/18 11:00  71  125/76    


 


3/30/18 10:45  72  122/77    


 


3/30/18 10:30  71  125/76    


 


3/30/18 10:15  70  133/78    


 


3/30/18 10:00  71  131/84    


 


3/30/18 09:45  74  131/87    


 


3/30/18 09:30  69  130/82    


 


3/30/18 09:00  68  132/83    


 


3/30/18 08:41 36.6 68  123/76 (92)    


 


3/30/18 08:00      Room Air  


 


3/30/18 07:28 36.6 70 17 138/83 (101) 93 Room Air  


 


3/30/18 04:00      Room Air  


 


3/30/18 03:52 36.3 70 16 136/78 (97) 96 Room Air  


 


3/29/18 23:59      Room Air  


 


3/29/18 23:26 36.8 62 18 141/79 (99) 94 Room Air  


 


3/29/18 20:00      Room Air  


 


3/29/18 19:37 36.8 62 18 153/66 (95) 94 Room Air  











Physical Exam


General Appearance:  WD/WN, no apparent distress


Eyes:  normal inspection, EOMI, sclerae normal


ENT:  normal ENT inspection, pharynx normal


Neck:  supple, no adenopathy, thyroid normal, trachea midline


Respiratory/Chest:  chest non-tender, lungs clear, normal breath sounds, no 

respiratory distress


Cardiovascular:  regular rate, rhythm, no gallop, no murmur


Abdomen:  normal bowel sounds, non tender, soft, no organomegaly


Extremities:  non-tender, no calf tenderness, normal capillary refill


Neurologic/Psychiatric:  alert, oriented x 3


Skin:  normal color, warm/dry, no rash


Lymphatic:  no adenopathy





Laboratory Results


------------------





RUN DATE: 18                [facility]             

    PAGE 1   


RUN TIME: 925                            Specimen Inquiry                    


--------------------------------------------------------------------------------

------------





PATIENT: KAREY GARCIA                 ACCT #: [id number] LOC:  [facility] #

: [id number]


                                       AGE/SX: 63/M         ROOM: [facility]       REG

: 18


REG DR:  Marycarmen Marquis DO         :    1955   BED:  2          DIS

:         


                                       STATUS: ADM IN       TLOC:           


--------------------------------------------------------------------------------

------------








SPEC #: [id number]        CHIKIS:      STATUS:  RES            REQ 

#: [id number]


                            RECD:      SUBM DR: Shasta Humphrey MD       


SOURCE: BLOOD               ENTR:      [facility] DR: Gonzalo Rothman D.O.       


SPDESC:                                                      Marycarmen Marquis DO Love, Lauren ., 

Alana Paulino D.O. Simoni, Eugene J., M.D.


ORDERED:  BLOOD CULTURE                                                        

   


--------------------------------------------------------------------------------

------------





  Procedure                         Result                         Verified    

       Site


--------------------------------------------------------------------------------

------------





  BLD CULT  Preliminary                                            


     Organism 1                     GRAM POSITIVE COCCI


        SENS                        SENSITIVITIES DEPENDENT ON FURTHER 

IDENTIFICATION





        Phoned Positive Blood Culture Gram Stain Report to PRADEEP CADET on 18 At 0923 By [facility]. Results were


        verbalized back to [facility].


        





--------------------------------------------------------------------------------

------------












































Last 24 Hours








Test


  3/29/18


20:25 3/30/18


04:54 3/30/18


07:37 3/30/18


11:38


 


Bedside Glucose 88 mg/dl   81 mg/dl  111 mg/dl 


 


White Blood Count  7.28 K/uL   


 


Red Blood Count  3.92 M/uL   


 


Hemoglobin  12.7 g/dL   


 


Hematocrit  37.8 %   


 


Mean Corpuscular Volume  96.4 fL   


 


Mean Corpuscular Hemoglobin  32.4 pg   


 


Mean Corpuscular Hemoglobin


Concent 


  33.6 g/dl 


  


  


 


 


RDW Standard Deviation  45.8 fL   


 


RDW Coefficient of Variation  13.2 %   


 


Platelet Count  65 K/uL   


 


Mean Platelet Volume  9.0 fL   


 


Sodium Level  139 mmol/L   


 


Potassium Level  4.7 mmol/L   


 


Chloride Level  109 mmol/L   


 


Carbon Dioxide Level  21 mmol/L   


 


Anion Gap  9.0 mmol/L   


 


Blood Urea Nitrogen  52 mg/dl   


 


Creatinine  6.19 mg/dl   


 


Est Creatinine Clear Calc


Drug Dose 


  10.9 ml/min 


  


  


 


 


Estimated GFR (


American) 


  10.1 


  


  


 


 


Estimated GFR (Non-


American 


  8.7 


  


  


 


 


BUN/Creatinine Ratio  8.6   


 


Random Glucose  92 mg/dl   


 


Calcium Level  8.5 mg/dl   


 


Random Vancomycin Level  20.1 mcg/ml   


 


Test


  3/30/18


16:28 


  


  


 


 


Bedside Glucose 169 mg/dl    











Assessment and Plan


63-year-old male with end-stage renal disease on dialysis now with enterococcal 

bacteremia from infected dialysis catheter now removed.  Patient should be 

continued on vancomycin pending results of further blood cultures.  Likely will 

need in the range of 2 weeks of IV therapy.  Will follow.

## 2018-03-30 NOTE — PHARMACY PROGRESS NOTE
Pharmacy Abx Dose Short Note


Date of Service


Mar 30, 2018.





Assessment & Plan


Assessment








63 year old male receiving Vancomycin for treatment of pansensitive E Faecalis 

bacteremia per recommendation of ID.


Day # 5 of antimicrobial therapy.


ID recommends 14 days of total therapy.


Blood cultures from 3/29 pending








Item Value  Date Time


 


Random Vancomycin Level 20.1 mcg/ml 3/30/18 0454

















Plan








Vancomycin


* Pre-HD random level of 20.1 mcg/mL is therapeutic.


* Ordered 500mg IV x 1 to be administered post HD today.


* Goal trough level for bacteremia : 15 to 20 mcg/mL


* Random level ordered for: 4/1 @8051





Pharmacy will continue to follow and will adjust dose/frequency as necessary.  

Thank you.

## 2018-03-30 NOTE — PHARMACY PROGRESS NOTE
Pharmacy Glycemic Short Note 2


Date of Service


Mar 30, 2018.





OUTPATIENT ANTIDIABETIC REGIMEN: 


* Lantus 35 units in the evening plus Novolog 12 with breakfast, 14 with lunch, 

and 16 with dinner (total = 77 units)











ASSESSMENT:


* Mr Lara is a 64 y/o M with a PMH of liver transplant, hepatitis B, 

hemodialysis MWF, and unknown control of type 2 diabetes (HbA1C is not reliable 

in this patient as he undergoes hemodialysis which can effect the results). He 

is currently being treated for an Enterococcus bacteremia with vancomycin.


* During a previous hospitalization in December of last year, the patient 

consistently required 10 units of Lantus BID plus around 20-27 units of 

correctional insulin. 


* The patient's blood sugar yesterday was -[phone]. He received 23 units 

of insulin (12 units of Lantus) ... the patient's fasting blood sugar was 81 mg/

dL this morning. Loosened Lantus again for tonight to 10 units. Loosened 

Novolog with lunch and dinner as patient trended downwards yesterday. Continue 

same parameters with breakfast as historically the patient has large spikes 

here.








PLAN FOR INPATIENT GLYCEMIC CONTROL:





* Basal insulin


 * Lantus 10 units SQ qHS 





* Bolus insulin


 * NovoLog per scale ACHS or Q6hrs while NPO


 * Goal Range:  Low 110 mg/dL - High 140 mg/dL


 * Correction Factor:  20 mg/dL/unit with breakfast and 25 mg/dL/unit with all 

other meals 


 * Nutritional / Prandial insulin per carb ratio of  1 unit per 10 grams CHO 

consumed with breakfast and 1 unit per 12 grams CHO consumed with all other 

meals








PLAN FOR DISCHARGE:


* The patient is a dialysis patient and therefore HbA1C is not an adequate 

indicator of glycemic control. Recommend checking blood sugars and making a log 

for outpatient adjustment of insulin.

## 2018-03-30 NOTE — PROGRESS NOTE
DATE: 03/30/2018

 

DIALYSIS PROGRESS NOTE

 

SUBJECTIVE:  The patient was seen during dialysis and so far he is not

feeling any harmful effect.  No fever, no chills, no cramping.

 

PHYSICAL EXAMINATION:

VITAL SIGNS:   blood pressure is good at 121/74, pulse of 74 per minute.  AV

fistula is working good with a smaller needle and a blood flow of 250. 

Dialysis catheter is out at this time.

GENERAL:  Awake, alert, oriented x3.

HEENT:  Mucous membrane is moist.

NECK:  Supple.  No jugular venous distention.

CARDIOVASCULAR:  Normal speech.  Moving all 4 extremities.  Regular rate and

rhythm, no edema.

LUNGS:  Clear to auscultation.

EXTREMITIES:  Shows no edema.

SKIN:  Warm and dry.

 

LABORATORY TESTS:  From this morning was reviewed.  Hemoglobin 12.7, WBC

count 7.28, and platelet count 65,000 but is stable at that level.  BUN 52,

creatinine 6.19, sodium 139, potassium 4.7.  The blood culture that was done

yesterday is still showing positive blood culture which is concerning as the

dialysis catheter is already out.

 

ASSESSMENT AND PLAN:  The patient with end-stage renal disease and

bacteremia, presumably related with the dialysis catheter, but it is

concerning that even after the dialysis catheter has been removed, the blood

culture from yesterday still positive.  Continue to do blood culture daily

until we can document that he is negative.  Antibiotic treatment as per

infectious disease.  Fortunately, the dialysis fistula is working pretty

good.  The patient is immunocompromised.   He did have an echocardiogram which 
did not show any endocarditis. 



 

 

 

[facility]

## 2018-03-30 NOTE — PROGRESS NOTE
Subjective


Date of Service:


Mar 30, 2018.


Subjective


Pt evaluation today including:  conversation w/ patient, physical exam, lab 

review, review of studies, review of inpatient medication list


Saw/examined the patient in room 277


He had dialysis this morning, did well with it


No problems/issues at this time





Review of Systems


Constitutional:  No fever, No chills


Respiratory:  No cough, No sputum, No shortness of breath


Cardiac:  No chest pain


Abdomen:  + nausea (resolved), No pain, No vomiting, No diarrhea


Heme:  No abnormal bleeding/bruising





Medications





Current Inpatient Medications








 Medications


  (Trade)  Dose


 Ordered  Sig/Basilia


 Route  Start Time


 Stop Time Status Last Admin


Dose Admin


 


 Heparin Sodium


  (Porcine)


  (Heparin Sq 5000


 Unit/0.5ml)  5,000 unit  Q8


 SQ  3/26/18 22:00


 4/25/18 21:59  3/30/18 14:08


5,000 UNIT


 


 Acetaminophen


  (Tylenol Tab)  650 mg  Q4H  PRN


 PO  3/26/18 14:30


 4/25/18 14:29   


 


 


 Insulin Aspart


  (novoLOG ASPART)  **SLIDING


 SCALE**


 **If C...  TID@1100,1630,2100


 SC  3/26/18 16:30


 4/25/18 16:29  3/29/18 17:46


2 UNITS


 


 Glucose


  (Glucose 40% Gel)  15-30


 GRAMS 15


 GRAMS...  UD  PRN


 PO  3/26/18 15:15


 4/25/18 15:14   


 


 


 Glucose


  (Glucose Chew


 Tab)  4-8


 Tablets 4


 Tabl...  UD  PRN


 PO  3/26/18 15:15


 4/25/18 15:14  3/26/18 17:31


4 TABS


 


 Dextrose


  (Dextrose 50%


 50ML Syringe)  25-50ML OF


 50% DW IV


 FOR...  UD  PRN


 IV  3/26/18 15:15


 4/25/18 15:14  3/27/18 07:57


50 ML


 


 Glucagon


  (Glucagon Inj)  1 mg  UD  PRN


 SQ  3/26/18 15:15


 4/25/18 15:14   


 


 


 Miscellaneous


 Information


  (Consult


 Glycemic


 Management


 Pharmacy)  1 ea  UD  PRN


 N/A  3/26/18 15:22


 4/25/18 15:21   


 


 


 Miscellaneous


 Information


  (Consult)  1 ea  UD  PRN


 N/A  3/26/18 15:30


 4/25/18 15:29   


 


 


 Allopurinol


  (Zyloprim Tab)  100 mg  DAILY


 PO  3/27/18 09:00


 4/26/18 08:59  3/30/18 07:44


100 MG


 


 Calcium Acetate


  (Phoslo Cap)  667 mg  TIDM


 PO  3/26/18 17:00


 4/25/18 17:59  3/30/18 14:05


667 MG


 


 Clopidogrel


 Bisulfate


  (plAVix TAB)  75 mg  DAILY


 PO  3/27/18 09:00


 4/26/18 08:59  3/30/18 07:43


75 MG


 


 Ferrous Sulfate


  (Feosol Tab)  325 mg  TIDM


 PO  3/26/18 17:00


 4/25/18 17:59  3/30/18 14:06


325 MG


 


 Pantoprazole


 Sodium


  (Protonix Tab)  40 mg  DAILY


 PO  3/27/18 09:00


 4/26/18 08:59  3/30/18 07:43


40 MG


 


 Pravastatin Sodium


  (Pravachol Tab)  20 mg  DAILY


 PO  3/27/18 09:00


 4/26/18 08:59  3/30/18 07:41


20 MG


 


 Tacrolimus


  (Prograf Cap)  4 mg  BID


 PO  3/26/18 21:00


 4/25/18 20:59  3/30/18 07:44


4 MG


 


 Amitriptyline HCl


  (Elavil Tab)  150 mg  HS


 PO  3/26/18 21:00


 4/25/18 20:59  3/29/18 21:37


150 MG


 


 Cholecalciferol


  (Vitamin D Tab)  2,000


 inter.unit  DAILY


 PO  3/27/18 09:00


 4/26/18 08:59  3/30/18 07:44


2,000 INTER.UNIT


 


 Insulin Aspart


  (novoLOG ASPART)  **SLIDING


 SCALE**


 **If C...  QDB


 SC  3/27/18 08:00


 4/26/18 07:59  3/29/18 08:02


3 UNITS


 


 Non-Formulary


 Medication


  (Non-Formulary


 Patient'S Own Med)  1 ea  DAILY


 PO  3/27/18 09:00


 4/26/18 08:59  3/30/18 07:46


1 EA


 


 Insulin Glargine


  (Lantus Solostar


 Pen)  10 units  HS


 SC  3/30/18 21:00


 4/29/18 20:59   


 


 


 Vancomycin HCl


 500 mg/Sodium


 Chloride  110 ml @ 


 110 mls/hr  TODAY@2000  ONCE


 IV  3/30/18 20:00


 3/30/18 20:59   


 


 


 Ondansetron HCl


  (Zofran Inj)  4 mg  Q6H  PRN


 IV  3/30/18 10:00


 4/29/18 09:59   


 











Objective


Vital Signs











  Date Time  Temp Pulse Resp B/P (MAP) Pulse Ox O2 Delivery O2 Flow Rate FiO2


 


3/30/18 15:46 35.8 70 18 130/73 (92) 95 Room Air  


 


3/30/18 13:30 36.7 70 18 146/79 (101) 95 Room Air  


 


3/30/18 12:50 37.1 75  135/82 (99)    


 


3/30/18 12:30  76  121/74    


 


3/30/18 12:15  74  114/71    


 


3/30/18 12:00  75  125/79    


 


3/30/18 11:45  74  129/80    


 


3/30/18 11:30  73  125/81    


 


3/30/18 11:15  72  129/79    


 


3/30/18 11:00  71  125/76    


 


3/30/18 10:45  72  122/77    


 


3/30/18 10:30  71  125/76    


 


3/30/18 10:15  70  133/78    


 


3/30/18 10:00  71  131/84    


 


3/30/18 09:45  74  131/87    


 


3/30/18 09:30  69  130/82    


 


3/30/18 09:00  68  132/83    


 


3/30/18 08:41 36.6 68  123/76 (92)    


 


3/30/18 08:00      Room Air  


 


3/30/18 07:28 36.6 70 17 138/83 (101) 93 Room Air  


 


3/30/18 04:00      Room Air  


 


3/30/18 03:52 36.3 70 16 136/78 (97) 96 Room Air  


 


3/29/18 23:59      Room Air  


 


3/29/18 23:26 36.8 62 18 141/79 (99) 94 Room Air  


 


3/29/18 20:00      Room Air  


 


3/29/18 19:37 36.8 62 18 153/66 (95) 94 Room Air  


 


3/29/18 16:00      Room Air  











Physical Exam


General Appearance:  no apparent distress, + thin


Respiratory/Chest:  lungs clear, normal breath sounds, no respiratory distress, 

no accessory muscle use


Cardiovascular:  regular rate, rhythm, no edema, no murmur


Abdomen:  normal bowel sounds, non tender, soft


Extremities:  normal range of motion, non-tender, normal inspection, no pedal 

edema, no calf tenderness


Neurologic/Psychiatric:  no motor/sensory deficits, alert, normal mood/affect, 

+ motor weakness





Laboratory Results





Last 24 Hours








Test


  3/29/18


16:16 3/29/18


20:25 3/30/18


04:54 3/30/18


07:37


 


Bedside Glucose 105 mg/dl  88 mg/dl   81 mg/dl 


 


White Blood Count   7.28 K/uL  


 


Red Blood Count   3.92 M/uL  


 


Hemoglobin   12.7 g/dL  


 


Hematocrit   37.8 %  


 


Mean Corpuscular Volume   96.4 fL  


 


Mean Corpuscular Hemoglobin   32.4 pg  


 


Mean Corpuscular Hemoglobin


Concent 


  


  33.6 g/dl 


  


 


 


RDW Standard Deviation   45.8 fL  


 


RDW Coefficient of Variation   13.2 %  


 


Platelet Count   65 K/uL  


 


Mean Platelet Volume   9.0 fL  


 


Sodium Level   139 mmol/L  


 


Potassium Level   4.7 mmol/L  


 


Chloride Level   109 mmol/L  


 


Carbon Dioxide Level   21 mmol/L  


 


Anion Gap   9.0 mmol/L  


 


Blood Urea Nitrogen   52 mg/dl  


 


Creatinine   6.19 mg/dl  


 


Est Creatinine Clear Calc


Drug Dose 


  


  10.9 ml/min 


  


 


 


Estimated GFR (


American) 


  


  10.1 


  


 


 


Estimated GFR (Non-


American 


  


  8.7 


  


 


 


BUN/Creatinine Ratio   8.6  


 


Random Glucose   92 mg/dl  


 


Calcium Level   8.5 mg/dl  


 


Random Vancomycin Level   20.1 mcg/ml  


 


Test


  3/30/18


11:38 


  


  


 


 


Bedside Glucose 111 mg/dl    











Assessment and Plan


This is a 63 year old male with a recent initiation of dialysis, AV fistula on 

R arm, not yet fully matured; insulin dependent DM, CAD, cerebral palsy





Enterococcal Bacteremia


3/30


- persistent bacteremia in the setting of immunocompromised patient


- catheter tip and blood cultures x3 so far are positive


- another set drawn, may need further w/up, defer to ID


- for now, continue IV Vancomycin with dialysis as per ID; will need 2 weeks of 

treatment at minimum





3/29


- blood cultures positive x2 for streptococcal species and tip culture positive 

for enterococcus


- spoke with ID, vanco with dialysis x2 weeks


- repeat blood cultures ordered and pending, once negative, can d/c home





3/28


- streptococcal species on blood cultures x2


- enterococcus species growing on the tip of the catheter; sensitivities pending


- continue Vanco for now





3/27


- patient admitted due to outpatient blood cultures being positive


- given IV Vanco


- tunneled dialysis catheter removed


- blood cultures here are also positive x2


- echo with no evidence of endocarditis


- further input as per ID





ESRD on HD


3/29


- dialysis on 3/30





3/28


- s/p dialysis today


- AV fistula functioning





3/27


- appreciate vascular surgery input


- catheter removed, tip cultured


- can use mature AV fistula


- dialysis as per nephrology





Hx. of CAD


- troponins negative x3


- unlikely acute issue


- continue home medications





Uncontrolled DM2


- continue insulin


- appreciate pharmacy glycemic control





DVT ppx


- subq heparin





FULL CODE

## 2018-03-31 VITALS
DIASTOLIC BLOOD PRESSURE: 82 MMHG | SYSTOLIC BLOOD PRESSURE: 140 MMHG | OXYGEN SATURATION: 94 % | TEMPERATURE: 98.24 F | HEART RATE: 69 BPM

## 2018-03-31 VITALS
HEART RATE: 73 BPM | SYSTOLIC BLOOD PRESSURE: 142 MMHG | DIASTOLIC BLOOD PRESSURE: 82 MMHG | TEMPERATURE: 97.88 F | OXYGEN SATURATION: 93 %

## 2018-03-31 VITALS
SYSTOLIC BLOOD PRESSURE: 135 MMHG | TEMPERATURE: 98.24 F | DIASTOLIC BLOOD PRESSURE: 74 MMHG | HEART RATE: 71 BPM | OXYGEN SATURATION: 96 %

## 2018-03-31 VITALS
SYSTOLIC BLOOD PRESSURE: 144 MMHG | OXYGEN SATURATION: 93 % | DIASTOLIC BLOOD PRESSURE: 82 MMHG | HEART RATE: 67 BPM | TEMPERATURE: 98.06 F

## 2018-03-31 VITALS
OXYGEN SATURATION: 91 % | TEMPERATURE: 98.06 F | HEART RATE: 66 BPM | SYSTOLIC BLOOD PRESSURE: 137 MMHG | DIASTOLIC BLOOD PRESSURE: 77 MMHG

## 2018-03-31 VITALS
DIASTOLIC BLOOD PRESSURE: 76 MMHG | TEMPERATURE: 97.52 F | OXYGEN SATURATION: 93 % | SYSTOLIC BLOOD PRESSURE: 132 MMHG | HEART RATE: 72 BPM

## 2018-03-31 VITALS
SYSTOLIC BLOOD PRESSURE: 125 MMHG | HEART RATE: 71 BPM | OXYGEN SATURATION: 96 % | DIASTOLIC BLOOD PRESSURE: 71 MMHG | TEMPERATURE: 98.06 F

## 2018-03-31 LAB
BUN SERPL-MCNC: 36 MG/DL (ref 7–18)
CALCIUM SERPL-MCNC: 8.3 MG/DL (ref 8.5–10.1)
CO2 SERPL-SCNC: 25 MMOL/L (ref 21–32)
CREAT SERPL-MCNC: 5.27 MG/DL (ref 0.6–1.4)
EOSINOPHIL NFR BLD AUTO: 57 K/UL (ref 130–400)
GLUCOSE SERPL-MCNC: 122 MG/DL (ref 70–99)
HCT VFR BLD CALC: 37.9 % (ref 42–52)
HGB BLD-MCNC: 12.4 G/DL (ref 14–18)
MCH RBC QN AUTO: 31.6 PG (ref 25–34)
MCHC RBC AUTO-ENTMCNC: 32.7 G/DL (ref 32–36)
MCV RBC AUTO: 96.4 FL (ref 80–100)
PMV BLD AUTO: 8.8 FL (ref 7.4–10.4)
POTASSIUM SERPL-SCNC: 4.7 MMOL/L (ref 3.5–5.1)
RED CELL DISTRIBUTION WIDTH CV: 13.2 % (ref 11.5–14.5)
RED CELL DISTRIBUTION WIDTH SD: 46.1 FL (ref 36.4–46.3)
SODIUM SERPL-SCNC: 138 MMOL/L (ref 136–145)
WBC # BLD AUTO: 6.68 K/UL (ref 4.8–10.8)

## 2018-03-31 RX ADMIN — INSULIN ASPART SCH UNITS: 100 INJECTION, SOLUTION INTRAVENOUS; SUBCUTANEOUS at 09:08

## 2018-03-31 RX ADMIN — INSULIN ASPART SCH UNITS: 100 INJECTION, SOLUTION INTRAVENOUS; SUBCUTANEOUS at 12:11

## 2018-03-31 RX ADMIN — CALCIUM ACETATE SCH MG: 667 CAPSULE ORAL at 09:07

## 2018-03-31 RX ADMIN — CALCIUM ACETATE SCH MG: 667 CAPSULE ORAL at 12:03

## 2018-03-31 RX ADMIN — HEPARIN SODIUM SCH UNIT: 10000 INJECTION, SOLUTION INTRAVENOUS; SUBCUTANEOUS at 13:39

## 2018-03-31 RX ADMIN — HEPARIN SODIUM SCH UNIT: 10000 INJECTION, SOLUTION INTRAVENOUS; SUBCUTANEOUS at 21:11

## 2018-03-31 RX ADMIN — PANTOPRAZOLE SCH MG: 40 TABLET, DELAYED RELEASE ORAL at 09:07

## 2018-03-31 RX ADMIN — FERROUS SULFATE TAB EC 325 MG (65 MG FE EQUIVALENT) SCH MG: 325 (65 FE) TABLET DELAYED RESPONSE at 13:39

## 2018-03-31 RX ADMIN — AMITRIPTYLINE HYDROCHLORIDE SCH MG: 50 TABLET, FILM COATED ORAL at 21:06

## 2018-03-31 RX ADMIN — TACROLIMUS SCH MG: 1 CAPSULE ORAL at 09:07

## 2018-03-31 RX ADMIN — Medication SCH INTER.UNIT: at 09:06

## 2018-03-31 RX ADMIN — INSULIN ASPART SCH UNITS: 100 INJECTION, SOLUTION INTRAVENOUS; SUBCUTANEOUS at 21:00

## 2018-03-31 RX ADMIN — CLOPIDOGREL BISULFATE SCH MG: 75 TABLET, FILM COATED ORAL at 09:06

## 2018-03-31 RX ADMIN — INSULIN ASPART SCH UNITS: 100 INJECTION, SOLUTION INTRAVENOUS; SUBCUTANEOUS at 17:48

## 2018-03-31 RX ADMIN — FERROUS SULFATE TAB EC 325 MG (65 MG FE EQUIVALENT) SCH MG: 325 (65 FE) TABLET DELAYED RESPONSE at 17:43

## 2018-03-31 RX ADMIN — TACROLIMUS SCH MG: 1 CAPSULE ORAL at 21:06

## 2018-03-31 RX ADMIN — HEPARIN SODIUM SCH UNIT: 10000 INJECTION, SOLUTION INTRAVENOUS; SUBCUTANEOUS at 06:12

## 2018-03-31 RX ADMIN — INSULIN GLARGINE SCH UNITS: 100 INJECTION, SOLUTION SUBCUTANEOUS at 21:10

## 2018-03-31 RX ADMIN — PRAVASTATIN SODIUM SCH MG: 20 TABLET ORAL at 09:09

## 2018-03-31 RX ADMIN — FERROUS SULFATE TAB EC 325 MG (65 MG FE EQUIVALENT) SCH MG: 325 (65 FE) TABLET DELAYED RESPONSE at 09:07

## 2018-03-31 RX ADMIN — ALLOPURINOL SCH MG: 100 TABLET ORAL at 09:06

## 2018-03-31 RX ADMIN — CALCIUM ACETATE SCH MG: 667 CAPSULE ORAL at 17:43

## 2018-03-31 NOTE — PHARMACY PROGRESS NOTE
Pharmacy Glycemic Short Note 2


Date of Service


Mar 31, 2018.





OUTPATIENT ANTIDIABETIC REGIMEN: 


* Lantus 35 units in the evening plus Novolog 12 with breakfast, 14 with lunch, 

and 16 with dinner (total = 77 units)











ASSESSMENT:


* Mr Lara is a 64 y/o M with a PMH of liver transplant, hepatitis B, 

hemodialysis MWF, and unknown control of type 2 diabetes (HbA1C is not reliable 

in this patient as he undergoes hemodialysis which can effect the results). He 

is currently being treated for an Enterococcus bacteremia with vancomycin.


* Pt has been requiring ~20 units of insulin per day with adequate control. 


* BSGs ranging  mg/dl


* BSG spike today prior to lunch since breakfast CHO (toast) were not covered 

and patient snacked on a sherbert in between breakfast & lunch which was not 

covered. 


 * Will not make any changes today. 





PLAN FOR INPATIENT GLYCEMIC CONTROL:





* Basal insulin


 * Lantus 10 units SQ qHS 





* Bolus insulin


 * NovoLog per scale ACHS or Q6hrs while NPO


 * Goal Range:  Low 110 mg/dL - High 140 mg/dL


 * Correction Factor:  20 mg/dL/unit with breakfast and 25 mg/dL/unit with all 

other meals 


 * Nutritional / Prandial insulin per carb ratio of  1 unit per 10 grams CHO 

consumed with breakfast and 1 unit per 12 grams CHO consumed with all other 

meals








PLAN FOR DISCHARGE:


* A1c not reliable in HD/ESRD patient


* Recommend continuing to titrate outpatient regimen per PCP. Patient has been 

requiring significantly less insulin in house as compared to outpatient dosing. 

If patient is experiencing hypoglycemia outpatient insulin doses may need 

decreased.

## 2018-03-31 NOTE — PROGRESS NOTE
Subjective


Date of Service:


Mar 31, 2018.


Subjective


Pt evaluation today including:  conversation w/ patient, physical exam, lab 

review, review of studies, review of inpatient medication list


Saw/examined the patient in room 277


He's laying in bed, comfortable; no issues to note today





Review of Systems


Constitutional:  + weakness, No fever, No chills


Respiratory:  No shortness of breath


Cardiac:  No chest pain


Abdomen:  No pain, No nausea, No vomiting, No diarrhea





Medications





Current Inpatient Medications








 Medications


  (Trade)  Dose


 Ordered  Sig/Basilia


 Route  Start Time


 Stop Time Status Last Admin


Dose Admin


 


 Heparin Sodium


  (Porcine)


  (Heparin Sq 5000


 Unit/0.5ml)  5,000 unit  Q8


 SQ  3/26/18 22:00


 4/25/18 21:59  3/31/18 06:12


5,000 UNIT


 


 Acetaminophen


  (Tylenol Tab)  650 mg  Q4H  PRN


 PO  3/26/18 14:30


 4/25/18 14:29   


 


 


 Insulin Aspart


  (novoLOG ASPART)  **SLIDING


 SCALE**


 **If C...  TID@1100,1630,2100


 SC  3/26/18 16:30


 4/25/18 16:29  3/31/18 12:11


9 UNITS


 


 Glucose


  (Glucose 40% Gel)  15-30


 GRAMS 15


 GRAMS...  UD  PRN


 PO  3/26/18 15:15


 4/25/18 15:14   


 


 


 Glucose


  (Glucose Chew


 Tab)  4-8


 Tablets 4


 Tabl...  UD  PRN


 PO  3/26/18 15:15


 4/25/18 15:14  3/26/18 17:31


4 TABS


 


 Dextrose


  (Dextrose 50%


 50ML Syringe)  25-50ML OF


 50% DW IV


 FOR...  UD  PRN


 IV  3/26/18 15:15


 4/25/18 15:14  3/27/18 07:57


50 ML


 


 Glucagon


  (Glucagon Inj)  1 mg  UD  PRN


 SQ  3/26/18 15:15


 4/25/18 15:14   


 


 


 Miscellaneous


 Information


  (Consult


 Glycemic


 Management


 Pharmacy)  1 ea  UD  PRN


 N/A  3/26/18 15:22


 4/25/18 15:21   


 


 


 Miscellaneous


 Information


  (Consult)  1 ea  UD  PRN


 N/A  3/26/18 15:30


 4/25/18 15:29   


 


 


 Allopurinol


  (Zyloprim Tab)  100 mg  DAILY


 PO  3/27/18 09:00


 4/26/18 08:59  3/31/18 09:06


100 MG


 


 Calcium Acetate


  (Phoslo Cap)  667 mg  TIDM


 PO  3/26/18 17:00


 4/25/18 17:59  3/31/18 12:03


667 MG


 


 Clopidogrel


 Bisulfate


  (plAVix TAB)  75 mg  DAILY


 PO  3/27/18 09:00


 4/26/18 08:59  3/31/18 09:06


75 MG


 


 Ferrous Sulfate


  (Feosol Tab)  325 mg  TIDM


 PO  3/26/18 17:00


 4/25/18 17:59  3/31/18 09:07


325 MG


 


 Pantoprazole


 Sodium


  (Protonix Tab)  40 mg  DAILY


 PO  3/27/18 09:00


 4/26/18 08:59  3/31/18 09:07


40 MG


 


 Pravastatin Sodium


  (Pravachol Tab)  20 mg  DAILY


 PO  3/27/18 09:00


 4/26/18 08:59  3/31/18 09:09


20 MG


 


 Tacrolimus


  (Prograf Cap)  4 mg  BID


 PO  3/26/18 21:00


 4/25/18 20:59  3/31/18 09:07


4 MG


 


 Amitriptyline HCl


  (Elavil Tab)  150 mg  HS


 PO  3/26/18 21:00


 4/25/18 20:59  3/30/18 21:06


150 MG


 


 Cholecalciferol


  (Vitamin D Tab)  2,000


 inter.unit  DAILY


 PO  3/27/18 09:00


 4/26/18 08:59  3/31/18 09:06


2,000 INTER.UNIT


 


 Insulin Aspart


  (novoLOG ASPART)  **SLIDING


 SCALE**


 **If C...  QDB


 SC  3/27/18 08:00


 4/26/18 07:59  3/29/18 08:02


3 UNITS


 


 Non-Formulary


 Medication


  (Non-Formulary


 Patient'S Own Med)  1 ea  DAILY


 PO  3/27/18 09:00


 4/26/18 08:59  3/31/18 09:08


1 EA


 


 Insulin Glargine


  (Lantus Solostar


 Pen)  10 units  HS


 SC  3/30/18 21:00


 4/29/18 20:59  3/30/18 21:05


10 UNITS


 


 Ondansetron HCl


  (Zofran Inj)  4 mg  Q6H  PRN


 IV  3/30/18 10:00


 4/29/18 09:59   


 











Objective


Vital Signs











  Date Time  Temp Pulse Resp B/P (MAP) Pulse Ox O2 Delivery O2 Flow Rate FiO2


 


3/31/18 11:28 36.8 71 16 135/74 (94) 96 Room Air  


 


3/31/18 08:00      Room Air  


 


3/31/18 07:02 36.8 69 18 140/82 (101) 94 Room Air  


 


3/31/18 04:42 36.7 71 18 125/71 (89) 96 Room Air  


 


3/31/18 04:28      Room Air  


 


3/31/18 00:25 36.4 72 20 132/76 (94) 93 Room Air  


 


3/31/18 00:12      Room Air  


 


3/30/18 20:15 36.6 65 18 120/75 (90) 94 Room Air  


 


3/30/18 19:45     95 Room Air  


 


3/30/18 16:00      Room Air  


 


3/30/18 15:46 35.8 70 18 130/73 (92) 95 Room Air  


 


3/30/18 13:30 36.7 70 18 146/79 (101) 95 Room Air  











Physical Exam


General Appearance:  no apparent distress, + cachetic, + thin


Respiratory/Chest:  lungs clear, normal breath sounds, no respiratory distress, 

no accessory muscle use


Cardiovascular:  regular rate, rhythm, no edema, no murmur


Neurologic/Psychiatric:  alert, normal mood/affect, + motor weakness





Laboratory Results





Last 24 Hours








Test


  3/30/18


16:28 3/30/18


20:35 3/31/18


06:05 3/31/18


07:02


 


Bedside Glucose 169 mg/dl  142 mg/dl   100 mg/dl 


 


White Blood Count   6.68 K/uL  


 


Red Blood Count   3.93 M/uL  


 


Hemoglobin   12.4 g/dL  


 


Hematocrit   37.9 %  


 


Mean Corpuscular Volume   96.4 fL  


 


Mean Corpuscular Hemoglobin   31.6 pg  


 


Mean Corpuscular Hemoglobin


Concent 


  


  32.7 g/dl 


  


 


 


RDW Standard Deviation   46.1 fL  


 


RDW Coefficient of Variation   13.2 %  


 


Platelet Count   57 K/uL  


 


Mean Platelet Volume   8.8 fL  


 


Sodium Level   138 mmol/L  


 


Potassium Level   4.7 mmol/L  


 


Chloride Level   106 mmol/L  


 


Carbon Dioxide Level   25 mmol/L  


 


Anion Gap   8.0 mmol/L  


 


Blood Urea Nitrogen   36 mg/dl  


 


Creatinine   5.27 mg/dl  


 


Est Creatinine Clear Calc


Drug Dose 


  


  12.6 ml/min 


  


 


 


Estimated GFR (


American) 


  


  12.3 


  


 


 


Estimated GFR (Non-


American 


  


  10.6 


  


 


 


BUN/Creatinine Ratio   6.9  


 


Random Glucose   122 mg/dl  


 


Calcium Level   8.3 mg/dl  


 


Test


  3/31/18


11:35 


  


  


 


 


Bedside Glucose 253 mg/dl    











Assessment and Plan


This is a 63 year old male with a recent initiation of dialysis, AV fistula on 

R arm, not yet fully matured; insulin dependent DM, CAD, cerebral palsy





Enterococcal Bacteremia


3/31


- repeat blood cultures pending


- will continue Vancomycin with dialysis


- plan to d/c when blood cultures are negative





3/30


- persistent bacteremia in the setting of immunocompromised patient


- catheter tip and blood cultures x3 so far are positive


- another set drawn, may need further w/up, defer to ID


- for now, continue IV Vancomycin with dialysis as per ID; will need 2 weeks of 

treatment at minimum





3/29


- blood cultures positive x2 for streptococcal species and tip culture positive 

for enterococcus


- spoke with ID, vanco with dialysis x2 weeks


- repeat blood cultures ordered and pending, once negative, can d/c home





3/28


- streptococcal species on blood cultures x2


- enterococcus species growing on the tip of the catheter; sensitivities pending


- continue Vanco for now





3/27


- patient admitted due to outpatient blood cultures being positive


- given IV Vanco


- tunneled dialysis catheter removed


- blood cultures here are also positive x2


- echo with no evidence of endocarditis


- further input as per ID





ESRD on HD


3/29


- dialysis on 3/30





3/28


- s/p dialysis today


- AV fistula functioning





3/27


- appreciate vascular surgery input


- catheter removed, tip cultured


- can use mature AV fistula


- dialysis as per nephrology





Hx. of CAD


- troponins negative x3


- unlikely acute issue


- continue home medications





Uncontrolled DM2


- continue insulin


- appreciate pharmacy glycemic control





DVT ppx


- subq heparin





FULL CODE

## 2018-04-01 VITALS
OXYGEN SATURATION: 94 % | TEMPERATURE: 98.24 F | SYSTOLIC BLOOD PRESSURE: 133 MMHG | DIASTOLIC BLOOD PRESSURE: 75 MMHG | HEART RATE: 66 BPM

## 2018-04-01 VITALS
OXYGEN SATURATION: 96 % | TEMPERATURE: 97.34 F | SYSTOLIC BLOOD PRESSURE: 135 MMHG | DIASTOLIC BLOOD PRESSURE: 82 MMHG | HEART RATE: 78 BPM

## 2018-04-01 VITALS
SYSTOLIC BLOOD PRESSURE: 133 MMHG | HEART RATE: 69 BPM | OXYGEN SATURATION: 97 % | DIASTOLIC BLOOD PRESSURE: 86 MMHG | TEMPERATURE: 98.24 F

## 2018-04-01 VITALS
OXYGEN SATURATION: 93 % | SYSTOLIC BLOOD PRESSURE: 125 MMHG | TEMPERATURE: 98.06 F | HEART RATE: 57 BPM | DIASTOLIC BLOOD PRESSURE: 76 MMHG

## 2018-04-01 VITALS
HEART RATE: 69 BPM | DIASTOLIC BLOOD PRESSURE: 77 MMHG | OXYGEN SATURATION: 94 % | TEMPERATURE: 97.7 F | SYSTOLIC BLOOD PRESSURE: 129 MMHG

## 2018-04-01 VITALS
OXYGEN SATURATION: 93 % | DIASTOLIC BLOOD PRESSURE: 82 MMHG | TEMPERATURE: 97.52 F | SYSTOLIC BLOOD PRESSURE: 125 MMHG | HEART RATE: 74 BPM

## 2018-04-01 RX ADMIN — Medication SCH INTER.UNIT: at 08:48

## 2018-04-01 RX ADMIN — INSULIN ASPART SCH UNITS: 100 INJECTION, SOLUTION INTRAVENOUS; SUBCUTANEOUS at 12:37

## 2018-04-01 RX ADMIN — TACROLIMUS SCH MG: 1 CAPSULE ORAL at 08:48

## 2018-04-01 RX ADMIN — CALCIUM ACETATE SCH MG: 667 CAPSULE ORAL at 12:34

## 2018-04-01 RX ADMIN — AMITRIPTYLINE HYDROCHLORIDE SCH MG: 50 TABLET, FILM COATED ORAL at 20:30

## 2018-04-01 RX ADMIN — FERROUS SULFATE TAB EC 325 MG (65 MG FE EQUIVALENT) SCH MG: 325 (65 FE) TABLET DELAYED RESPONSE at 17:11

## 2018-04-01 RX ADMIN — PANTOPRAZOLE SCH MG: 40 TABLET, DELAYED RELEASE ORAL at 08:50

## 2018-04-01 RX ADMIN — CALCIUM ACETATE SCH MG: 667 CAPSULE ORAL at 08:53

## 2018-04-01 RX ADMIN — HEPARIN SODIUM SCH UNIT: 10000 INJECTION, SOLUTION INTRAVENOUS; SUBCUTANEOUS at 20:33

## 2018-04-01 RX ADMIN — INSULIN ASPART SCH UNITS: 100 INJECTION, SOLUTION INTRAVENOUS; SUBCUTANEOUS at 17:13

## 2018-04-01 RX ADMIN — FERROUS SULFATE TAB EC 325 MG (65 MG FE EQUIVALENT) SCH MG: 325 (65 FE) TABLET DELAYED RESPONSE at 08:50

## 2018-04-01 RX ADMIN — HEPARIN SODIUM SCH UNIT: 10000 INJECTION, SOLUTION INTRAVENOUS; SUBCUTANEOUS at 14:10

## 2018-04-01 RX ADMIN — TACROLIMUS SCH MG: 1 CAPSULE ORAL at 20:31

## 2018-04-01 RX ADMIN — HEPARIN SODIUM SCH UNIT: 10000 INJECTION, SOLUTION INTRAVENOUS; SUBCUTANEOUS at 05:40

## 2018-04-01 RX ADMIN — CLOPIDOGREL BISULFATE SCH MG: 75 TABLET, FILM COATED ORAL at 08:48

## 2018-04-01 RX ADMIN — INSULIN ASPART SCH UNITS: 100 INJECTION, SOLUTION INTRAVENOUS; SUBCUTANEOUS at 20:32

## 2018-04-01 RX ADMIN — CALCIUM ACETATE SCH MG: 667 CAPSULE ORAL at 17:11

## 2018-04-01 RX ADMIN — ALLOPURINOL SCH MG: 100 TABLET ORAL at 08:55

## 2018-04-01 RX ADMIN — INSULIN ASPART SCH UNITS: 100 INJECTION, SOLUTION INTRAVENOUS; SUBCUTANEOUS at 09:03

## 2018-04-01 RX ADMIN — INSULIN GLARGINE SCH UNITS: 100 INJECTION, SOLUTION SUBCUTANEOUS at 20:32

## 2018-04-01 RX ADMIN — FERROUS SULFATE TAB EC 325 MG (65 MG FE EQUIVALENT) SCH MG: 325 (65 FE) TABLET DELAYED RESPONSE at 12:34

## 2018-04-01 RX ADMIN — PRAVASTATIN SODIUM SCH MG: 20 TABLET ORAL at 08:51

## 2018-04-01 NOTE — NEPHROLOGY PROGRESS NOTE
DATE: 04/01/2018

 

SUBJECTIVE:  The patient was doing okay.  His last dialysis was yesterday

without any problem.  Blood culture has finally become negative.  No fever,

no chills.  No cramping.

 

PHYSICAL EXAMINATION:

VITAL SIGNS:  Blood pressure 133/75, 94% on room air, pulse rate 66,

temperature 36.8.

HEENT:  Mucous membranes moist.

NECK:  Supple.  No jugular venous distention.

CARDIOVASCULAR:  Regular rate and rhythm, no murmur, rubs or gallop.

ABDOMEN:  Soft, nontender.

CHEST:  Bilateral clear to auscultation.

NEUROLOGIC:  Normal speech.  Moving all 4 extremities.

EXTREMITIES:  No edema.

SKIN:  Warm and dry.

 

LABORATORY TESTS:  Labs from yesterday shows a hemoglobin 12.4,

platelet count 57,000, it is low but stable.

 

ASSESSMENT AND PLAN:  The patient with end-stage renal disease, also status

post liver transplant, admitted with catheter-related bacteremia.  The

infected catheter is out and was positive for E. faecalis, which is being

appropriately treated.  Blood culture has returned negative now.  As per the

infectious disease recommendation, he will get IV vancomycin for 2 weeks. 

Fortunately, arteriovenous fistula is working good.  We used the

arteriovenous fistula yesterday without any problem.  Will continue to do

dialysis through the arteriovenous fistula with a smaller size needle at a

smaller blood flow. Next HD tomorrow. 

 

 

 

 

[facility]

## 2018-04-01 NOTE — PROGRESS NOTE
Subjective


Date of Service:


Apr 1, 2018.


Subjective


Pt evaluation today including:  conversation w/ patient, physical exam, lab 

review, review of studies, review of inpatient medication list


Saw/examined the patient in room 277


No issues to note today





Review of Systems


Constitutional:  No fever, No chills, No weakness


Respiratory:  No cough, No sputum, No shortness of breath


Cardiac:  No chest pain


Abdomen:  No pain, No nausea, No vomiting, No diarrhea





Medications





Current Inpatient Medications








 Medications


  (Trade)  Dose


 Ordered  Sig/Basilia


 Route  Start Time


 Stop Time Status Last Admin


Dose Admin


 


 Heparin Sodium


  (Porcine)


  (Heparin Sq 5000


 Unit/0.5ml)  5,000 unit  Q8


 SQ  3/26/18 22:00


 4/25/18 21:59  4/1/18 05:40


5,000 UNIT


 


 Acetaminophen


  (Tylenol Tab)  650 mg  Q4H  PRN


 PO  3/26/18 14:30


 4/25/18 14:29   


 


 


 Insulin Aspart


  (novoLOG ASPART)  **SLIDING


 SCALE**


 **If C...  TID@1100,1630,2100


 SC  3/26/18 16:30


 4/25/18 16:29  3/31/18 17:48


5 UNITS


 


 Glucose


  (Glucose 40% Gel)  15-30


 GRAMS 15


 GRAMS...  UD  PRN


 PO  3/26/18 15:15


 4/25/18 15:14   


 


 


 Glucose


  (Glucose Chew


 Tab)  4-8


 Tablets 4


 Tabl...  UD  PRN


 PO  3/26/18 15:15


 4/25/18 15:14  3/26/18 17:31


4 TABS


 


 Dextrose


  (Dextrose 50%


 50ML Syringe)  25-50ML OF


 50% DW IV


 FOR...  UD  PRN


 IV  3/26/18 15:15


 4/25/18 15:14  3/27/18 07:57


50 ML


 


 Glucagon


  (Glucagon Inj)  1 mg  UD  PRN


 SQ  3/26/18 15:15


 4/25/18 15:14   


 


 


 Miscellaneous


 Information


  (Consult


 Glycemic


 Management


 Pharmacy)  1 ea  UD  PRN


 N/A  3/26/18 15:22


 4/25/18 15:21   


 


 


 Miscellaneous


 Information


  (Consult)  1 ea  UD  PRN


 N/A  3/26/18 15:30


 4/25/18 15:29   


 


 


 Allopurinol


  (Zyloprim Tab)  100 mg  DAILY


 PO  3/27/18 09:00


 4/26/18 08:59  4/1/18 08:55


100 MG


 


 Calcium Acetate


  (Phoslo Cap)  667 mg  TIDM


 PO  3/26/18 17:00


 4/25/18 17:59  4/1/18 08:53


667 MG


 


 Clopidogrel


 Bisulfate


  (plAVix TAB)  75 mg  DAILY


 PO  3/27/18 09:00


 4/26/18 08:59  4/1/18 08:48


75 MG


 


 Ferrous Sulfate


  (Feosol Tab)  325 mg  TIDM


 PO  3/26/18 17:00


 4/25/18 17:59  4/1/18 08:50


325 MG


 


 Pantoprazole


 Sodium


  (Protonix Tab)  40 mg  DAILY


 PO  3/27/18 09:00


 4/26/18 08:59  4/1/18 08:50


40 MG


 


 Pravastatin Sodium


  (Pravachol Tab)  20 mg  DAILY


 PO  3/27/18 09:00


 4/26/18 08:59  4/1/18 08:51


20 MG


 


 Tacrolimus


  (Prograf Cap)  4 mg  BID


 PO  3/26/18 21:00


 4/25/18 20:59  4/1/18 08:48


4 MG


 


 Amitriptyline HCl


  (Elavil Tab)  150 mg  HS


 PO  3/26/18 21:00


 4/25/18 20:59  3/31/18 21:06


150 MG


 


 Cholecalciferol


  (Vitamin D Tab)  2,000


 inter.unit  DAILY


 PO  3/27/18 09:00


 4/26/18 08:59  4/1/18 08:48


2,000 INTER.UNIT


 


 Insulin Aspart


  (novoLOG ASPART)  **SLIDING


 SCALE**


 **If C...  QDB


 SC  3/27/18 08:00


 4/26/18 07:59  4/1/18 09:03


5 UNITS


 


 Non-Formulary


 Medication


  (Non-Formulary


 Patient'S Own Med)  1 ea  DAILY


 PO  3/27/18 09:00


 4/26/18 08:59  4/1/18 08:52


1 EA


 


 Insulin Glargine


  (Lantus Solostar


 Pen)  10 units  HS


 SC  3/30/18 21:00


 4/29/18 20:59  3/31/18 21:10


10 UNITS


 


 Ondansetron HCl


  (Zofran Inj)  4 mg  Q6H  PRN


 IV  3/30/18 10:00


 4/29/18 09:59   


 











Objective


Vital Signs











  Date Time  Temp Pulse Resp B/P (MAP) Pulse Ox O2 Delivery O2 Flow Rate FiO2


 


4/1/18 11:35 36.8 66 16 133/75 (94) 94 Room Air  


 


4/1/18 08:00      Room Air  


 


4/1/18 07:07 36.4 74 20 125/82 (96) 93   


 


4/1/18 04:00      Room Air  


 


4/1/18 03:58 36.5 69 18 129/77 (94) 94 Room Air  


 


4/1/18 00:00      Room Air  


 


3/31/18 23:17 36.6 73 20 142/82 (102) 93 Room Air  


 


3/31/18 20:00      Room Air  


 


3/31/18 18:44 36.7 67 20 144/82 (102) 93   


 


3/31/18 16:00      Room Air  


 


3/31/18 15:32 36.7 66 20 137/77 (97) 91   











Physical Exam


General Appearance:  no apparent distress, + cachetic, + thin


Respiratory/Chest:  lungs clear, normal breath sounds, no respiratory distress, 

no accessory muscle use


Cardiovascular:  regular rate, rhythm, no edema, no murmur





Laboratory Results





Last 24 Hours








Test


  3/31/18


16:12 3/31/18


20:05 4/1/18


06:40 4/1/18


07:16


 


Bedside Glucose 195 mg/dl  108 mg/dl   147 mg/dl 


 


Random Vancomycin Level   17.7 mcg/ml  


 


Test


  4/1/18


11:39 


  


  


 


 


Bedside Glucose 228 mg/dl    











Assessment and Plan


This is a 63 year old male with a recent initiation of dialysis, AV fistula on 

R arm, not yet fully matured; insulin dependent DM, CAD, cerebral palsy





Enterococcal Bacteremia


4/1


- can likely discharge in 1-2 days


- blood cultures negative thus far


- will need Vanco x2 weeks with dialysis on discharge





3/31


- repeat blood cultures pending


- will continue Vancomycin with dialysis


- plan to d/c when blood cultures are negative





3/30


- persistent bacteremia in the setting of immunocompromised patient


- catheter tip and blood cultures x3 so far are positive


- another set drawn, may need further w/up, defer to ID


- for now, continue IV Vancomycin with dialysis as per ID; will need 2 weeks of 

treatment at minimum





3/29


- blood cultures positive x2 for streptococcal species and tip culture positive 

for enterococcus


- spoke with ID, vanco with dialysis x2 weeks


- repeat blood cultures ordered and pending, once negative, can d/c home





3/28


- streptococcal species on blood cultures x2


- enterococcus species growing on the tip of the catheter; sensitivities pending


- continue Vanco for now





3/27


- patient admitted due to outpatient blood cultures being positive


- given IV Vanco


- tunneled dialysis catheter removed


- blood cultures here are also positive x2


- echo with no evidence of endocarditis


- further input as per ID





ESRD on HD


3/29


- dialysis on 3/30





3/28


- s/p dialysis today


- AV fistula functioning





3/27


- appreciate vascular surgery input


- catheter removed, tip cultured


- can use mature AV fistula


- dialysis as per nephrology





Hx. of CAD


- troponins negative x3


- unlikely acute issue


- continue home medications





Uncontrolled DM2


- continue insulin


- appreciate pharmacy glycemic control





DVT ppx


- subq heparin





FULL CODE

## 2018-04-01 NOTE — PHARMACY PROGRESS NOTE
Pharmacy Antibiotic Prog Note


Date of Service


Apr 1, 2018.





Subjective


The patient is currently receiving VANCOMYCIN IV every empirically based on 

levels.





The patient is currently on day # 7 of VANCOMYCIN IV therapy.





Objective


Height (Feet):  5


Height (Inches):  7.00


Weight (Kilograms):  62.500


Levels:











Item Value  Date Time


 


Random Vancomycin Level 17.7 mcg/ml 4/1/18 0640








Lab Results (24hrs):











Test


  4/1/18


06:40 4/1/18


07:16 4/1/18


11:39


 


Random Vancomycin Level 17.7 mcg/ml   


 


Bedside Glucose


  


  147 mg/dl


(70-99) 228 mg/dl


(70-99)











Assessment & Plan


64yo male on HD M/W/F receiving VANCOMYCIN for bacteremia.





VANCOMYCIN:


* Last dose of VANCOMYCIN was administered POST-HD on 3/30 (Friday) following a 

PRE-HD level of 20.1mcg/mL.


* Random VANCOMYCIN level drawn this am = 17.7 mcg/mL.


* This drug level is Therapeutic.


* Patient is not scheduled for HD until tomorrow and may be discharged prior to 

HD.


* Will administer VANCOMYCIN tomorrow POST-HD if patient is still admitted.


* Goal random level:  between 15 - 20 mcg/mL.





Pharmacy will continue to follow and will adjust dose/frequency as necessary.  

Thank you

## 2018-04-02 VITALS — DIASTOLIC BLOOD PRESSURE: 77 MMHG | HEART RATE: 70 BPM | SYSTOLIC BLOOD PRESSURE: 117 MMHG

## 2018-04-02 VITALS — HEART RATE: 73 BPM | DIASTOLIC BLOOD PRESSURE: 69 MMHG | SYSTOLIC BLOOD PRESSURE: 105 MMHG

## 2018-04-02 VITALS — DIASTOLIC BLOOD PRESSURE: 79 MMHG | SYSTOLIC BLOOD PRESSURE: 119 MMHG | HEART RATE: 70 BPM

## 2018-04-02 VITALS
DIASTOLIC BLOOD PRESSURE: 69 MMHG | HEART RATE: 65 BPM | TEMPERATURE: 97.7 F | OXYGEN SATURATION: 96 % | SYSTOLIC BLOOD PRESSURE: 119 MMHG

## 2018-04-02 VITALS — SYSTOLIC BLOOD PRESSURE: 114 MMHG | DIASTOLIC BLOOD PRESSURE: 74 MMHG | HEART RATE: 75 BPM

## 2018-04-02 VITALS — DIASTOLIC BLOOD PRESSURE: 70 MMHG | SYSTOLIC BLOOD PRESSURE: 112 MMHG | HEART RATE: 73 BPM

## 2018-04-02 VITALS — SYSTOLIC BLOOD PRESSURE: 128 MMHG | TEMPERATURE: 97.88 F | HEART RATE: 78 BPM | DIASTOLIC BLOOD PRESSURE: 74 MMHG

## 2018-04-02 VITALS — SYSTOLIC BLOOD PRESSURE: 114 MMHG | DIASTOLIC BLOOD PRESSURE: 72 MMHG | HEART RATE: 73 BPM

## 2018-04-02 VITALS
SYSTOLIC BLOOD PRESSURE: 113 MMHG | TEMPERATURE: 97.7 F | DIASTOLIC BLOOD PRESSURE: 95 MMHG | HEART RATE: 77 BPM | OXYGEN SATURATION: 96 %

## 2018-04-02 VITALS — HEART RATE: 74 BPM | SYSTOLIC BLOOD PRESSURE: 112 MMHG | DIASTOLIC BLOOD PRESSURE: 72 MMHG

## 2018-04-02 VITALS — DIASTOLIC BLOOD PRESSURE: 76 MMHG | SYSTOLIC BLOOD PRESSURE: 110 MMHG | HEART RATE: 77 BPM

## 2018-04-02 VITALS
HEART RATE: 65 BPM | DIASTOLIC BLOOD PRESSURE: 73 MMHG | TEMPERATURE: 97.88 F | SYSTOLIC BLOOD PRESSURE: 138 MMHG | OXYGEN SATURATION: 95 %

## 2018-04-02 VITALS — DIASTOLIC BLOOD PRESSURE: 72 MMHG | SYSTOLIC BLOOD PRESSURE: 118 MMHG | HEART RATE: 72 BPM

## 2018-04-02 VITALS — DIASTOLIC BLOOD PRESSURE: 68 MMHG | HEART RATE: 76 BPM | SYSTOLIC BLOOD PRESSURE: 109 MMHG

## 2018-04-02 VITALS — DIASTOLIC BLOOD PRESSURE: 67 MMHG | HEART RATE: 80 BPM | SYSTOLIC BLOOD PRESSURE: 104 MMHG

## 2018-04-02 VITALS — SYSTOLIC BLOOD PRESSURE: 113 MMHG | HEART RATE: 77 BPM | DIASTOLIC BLOOD PRESSURE: 95 MMHG

## 2018-04-02 VITALS — HEART RATE: 72 BPM | SYSTOLIC BLOOD PRESSURE: 108 MMHG | DIASTOLIC BLOOD PRESSURE: 68 MMHG

## 2018-04-02 VITALS — HEART RATE: 70 BPM | DIASTOLIC BLOOD PRESSURE: 70 MMHG | SYSTOLIC BLOOD PRESSURE: 117 MMHG

## 2018-04-02 VITALS — SYSTOLIC BLOOD PRESSURE: 113 MMHG | DIASTOLIC BLOOD PRESSURE: 72 MMHG | HEART RATE: 73 BPM

## 2018-04-02 RX ADMIN — ALLOPURINOL SCH MG: 100 TABLET ORAL at 08:21

## 2018-04-02 RX ADMIN — FERROUS SULFATE TAB EC 325 MG (65 MG FE EQUIVALENT) SCH MG: 325 (65 FE) TABLET DELAYED RESPONSE at 12:00

## 2018-04-02 RX ADMIN — CLOPIDOGREL BISULFATE SCH MG: 75 TABLET, FILM COATED ORAL at 08:15

## 2018-04-02 RX ADMIN — PANTOPRAZOLE SCH MG: 40 TABLET, DELAYED RELEASE ORAL at 08:16

## 2018-04-02 RX ADMIN — FERROUS SULFATE TAB EC 325 MG (65 MG FE EQUIVALENT) SCH MG: 325 (65 FE) TABLET DELAYED RESPONSE at 08:15

## 2018-04-02 RX ADMIN — CALCIUM ACETATE SCH MG: 667 CAPSULE ORAL at 13:36

## 2018-04-02 RX ADMIN — Medication SCH INTER.UNIT: at 08:16

## 2018-04-02 RX ADMIN — PRAVASTATIN SODIUM SCH MG: 20 TABLET ORAL at 08:15

## 2018-04-02 RX ADMIN — INSULIN ASPART SCH UNITS: 100 INJECTION, SOLUTION INTRAVENOUS; SUBCUTANEOUS at 13:35

## 2018-04-02 RX ADMIN — HEPARIN SODIUM SCH UNIT: 10000 INJECTION, SOLUTION INTRAVENOUS; SUBCUTANEOUS at 06:00

## 2018-04-02 RX ADMIN — TACROLIMUS SCH MG: 1 CAPSULE ORAL at 08:17

## 2018-04-02 RX ADMIN — INSULIN ASPART SCH UNITS: 100 INJECTION, SOLUTION INTRAVENOUS; SUBCUTANEOUS at 08:20

## 2018-04-02 RX ADMIN — CALCIUM ACETATE SCH MG: 667 CAPSULE ORAL at 08:15

## 2018-04-02 RX ADMIN — HEPARIN SODIUM SCH UNIT: 10000 INJECTION, SOLUTION INTRAVENOUS; SUBCUTANEOUS at 13:37

## 2018-04-02 NOTE — DISCHARGE SUMMARY
Discharge Summary


Date of Service


Apr 2, 2018.





Discharge Summary


Admission Date:


Mar 26, 2018 at 14:33


Discharge Date:  Apr 2, 2018


Discharge Disposition:  Home


Principal Diagnosis:


Enterococcal Bacteremia





ESRD on HD





Hx. of CAD





Uncontrolled DM2





Medication Reconciliation


New Medications:  


Vancomycin HCl in Sodium Chlor (Vancomycin 750-0.9 mg/150Ml-%) 1 Inj Inj


750 MG IV MWF for 14 Days, ML





 


Continued Medications:  


Allopurinol (Allopurinol) 100 Mg Tab


1 TAB PO DAILY





Amitriptyline Hcl (Amitriptyline Hcl) 150 Mg Tab


1 TAB PO HS for 30 Days, #30 TAB 1 Refill





Calcium Acetate (Phoslo 667 Mg) 667 Mg Cap


667 MG PO TIDM for 30 Days, #90 CAP





Cholecalciferol (D3 2000) 2,000 Unit Tab


1 TAB PO DAILY





Clopidogrel (Plavix) 75 Mg Tab


75 MG PO DAILY, TAB





Entecavir (Entecavir) 0.5 Mg Tab


1 TAB PO DAILY





Ferrous Sulfate (Ferrous Sulfate) 325 Mg Tab


1 TAB PO TIDM





Insulin Aspart (Novolog Flexpen) 100 Units/Ml Inj


14 UNITS SQ lunch





Insulin Aspart (Novolog) 100 Units/Ml Inj


12 UNITS SQ breakfast





Insulin Aspart (Novolog Flexpen) 100 Units/Ml Inj


16 UNITS SQ dinner





Insulin Glargine (Lantus) 100 Unit/Ml Inj


35 SC QPM, VIAL





Pantoprazole (Protonix) 40 Mg Tab


40 MG PO DAILY, #30 TAB





Pravastatin (Pravachol ) 20 Mg Tab


20 MG PO DAILY, TAB





Tacrolimus (Prograf) 1 Mg Cap


4 CAP PO BID for 30 Days, #240 CAP 11 Refills











Admission Information


HPI (per Admitting provider):


63-year-old male who presents to the ER by referral of nephrology after 

outpatient blood cultures were noted to be positive.  Patient is currently on 

dialysis Monday Wednesday Friday and have blood cultures drawn on 3/23/18.  

Patient reports he had been feeling chilled and more fatigued than normal so 

therefore blood cultures were drawn.  Patient currently has a tunneled catheter 

in place to his right chest.  Patient did undergo fistula placement at the 

beginning of February however has not been cleared by vascular surgery to use 

the fistula yet.  Patient denies any drainage or surrounding redness from the 

tunnel catheter site.  He has a wound on his right heel that he is currently 

following with wound care for.  There is some mild surrounding erythema however 

he reports this is chronic.  He has not noted any drainage from the wound.  

Patient reports chills however did not take his temperature at home.  He 

reports he did not have a fever at dialysis on Friday.  He denies chest pain 

and shortness of breath.  No lightheadedness, dizziness, diaphoresis, or 

syncopal events.  He denies abdominal pain, nausea, vomiting, diarrhea.  

Patient does continue to make urine despite being on dialysis.  He denies any 

urinary symptoms.  Of note, patient reports he was instructed to stop his 

carvedilol about 1 week ago due to hypotension noted at dialysis.  In the ED, 

patient is hemodynamically stable.  Labs unremarkable.  Patient was given a 

dose of vancomycin.


Physical Exam (per Admitting):  


   General Appearance:  WD/WN, no apparent distress


   Head:  normocephalic, atraumatic


   Eyes:  normal inspection, EOMI, sclerae normal


   ENT:  hearing grossly normal, + pertinent finding (Mucous membranes moist)


   Neck:  supple, no JVD, trachea midline


   Respiratory/Chest:  lungs clear, normal breath sounds, no respiratory 

distress, + pertinent finding (Tunnel catheter in place to right chest, no 

surrounding erythema or drainage)


   Cardiovascular:  regular rate, rhythm, normal peripheral pulses, + pertinent 

finding (+1 ankle edema)


   Abdomen/GI:  normal bowel sounds, non tender, soft, no organomegaly


   Extremities/Musculoskelatal:  no calf tenderness, normal capillary refill, + 

pertinent finding (BLLE atrophied, right foot drop)


   Neurologic/Psych:  no motor/sensory deficits, alert, normal mood/affect, 

oriented x 3


   Skin:  normal color, warm/dry





Hospital Course


This is a 63 year old male with a recent initiation of dialysis, AV fistula on 

R arm, not yet fully matured; insulin dependent DM, CAD, cerebral palsy





Enterococcal Bacteremia


4/2


- blood cultures negative


- Vancomycin set up to get with dialysis x2 weeks


- plan to d/c home today after dialysis





4/1


- can likely discharge in 1-2 days


- blood cultures negative thus far


- will need Vanco x2 weeks with dialysis on discharge





3/31


- repeat blood cultures pending


- will continue Vancomycin with dialysis


- plan to d/c when blood cultures are negative





3/30


- persistent bacteremia in the setting of immunocompromised patient


- catheter tip and blood cultures x3 so far are positive


- another set drawn, may need further w/up, defer to ID


- for now, continue IV Vancomycin with dialysis as per ID; will need 2 weeks of 

treatment at minimum





3/29


- blood cultures positive x2 for streptococcal species and tip culture positive 

for enterococcus


- spoke with ID, vanco with dialysis x2 weeks


- repeat blood cultures ordered and pending, once negative, can d/c home





3/28


- streptococcal species on blood cultures x2


- enterococcus species growing on the tip of the catheter; sensitivities pending


- continue Vanco for now





3/27


- patient admitted due to outpatient blood cultures being positive


- given IV Vanco


- tunneled dialysis catheter removed


- blood cultures here are also positive x2


- echo with no evidence of endocarditis


- further input as per ID





ESRD on HD


3/29


- dialysis on 3/30





3/28


- s/p dialysis today


- AV fistula functioning





3/27


- appreciate vascular surgery input


- catheter removed, tip cultured


- can use mature AV fistula


- dialysis as per nephrology





Hx. of CAD


- troponins negative x3


- unlikely acute issue


- continue home medications





Uncontrolled DM2


- continue insulin


- appreciate pharmacy glycemic control





DVT ppx


- subq heparin





FULL CODE


Total time spent on discharge = 40 minutes


This includes examination of the patient, discharge planning, medication 

reconciliation, and communication with other providers.





Discharge Instructions


Please follow-up with Baljit Coy at [place] on Friday, April 6 on 9:15AM


* You will be on Vancomycin for two weeks with dialysis on M-W-F

## 2018-04-02 NOTE — DISCHARGE INSTRUCTIONS
Discharge Instructions


Date of Service


Apr 2, 2018.





Admission


Reason for Admission:  Bacteremia





Discharge


Discharge Diagnosis / Problem:  Enterococcal Bacteremia - dialysis catheter 

infection





Discharge Goals


Goal(s):  Decrease discomfort, Improve function, Diagnostic testing, 

Therapeutic intervention





Activity Recommendations


Activity Limitations:  resume your previous activity





.





Instructions / Follow-Up


Instructions / Follow-Up


Please follow-up with Baljit Coy at [facility] on Friday, April 6 on 9:15AM


* You will be on Vancomycin for two weeks with dialysis on M-W-F





Current Hospital Diet


Patient's current hospital diet: AHA Diet (Heart Healthy), Renal Diet, Diabetes 

Type 2 Diet





Discharge Diet


Recommended Diet:  Diabetes Type 2 Diet, Renal Diet





Procedures


Procedures Performed:  


Removal Of Perm Catheter.





Pending Studies


Studies pending at discharge:  no





Medical Emergencies








.


Who to Call and When:





Medical Emergencies:  If at any time you feel your situation is an emergency, 

please call 911 immediately.





.





Non-Emergent Contact


Non-Emergency issues call your:  Primary Care Provider, Nephrologist





.


.








"Provider Documentation" section prepared by Marycarmen Marquis.








.

## 2018-04-02 NOTE — NEPHROLOGY PROGRESS NOTE
Nephrology Progress Note


Date of Service:


Apr 2, 2018.


Subjective


62 yo male with bacteremia from tunneled line infection. tunneled line removed. 

repeat cultures are negative. pt feels good. has very good appetite. would like 

to go home today if possible.





Objective











  Date Time  Temp Pulse Resp B/P (MAP) Pulse Ox O2 Delivery O2 Flow Rate FiO2


 


4/2/18 07:05 36.5 65 18 119/69 (86) 96 Room Air  


 


4/2/18 04:29 36.6 65 18 138/73 (94) 95 Room Air  


 


4/2/18 04:00      Room Air  


 


4/2/18 00:00      Room Air  


 


4/1/18 22:56 36.8 69 18 133/86 (102) 97 Room Air  


 


4/1/18 20:00      Room Air  


 


4/1/18 18:56 36.3 78 20 135/82 (99) 96   


 


4/1/18 16:00      Room Air  


 


4/1/18 15:46 36.7 57 18 125/76 (92) 93 Room Air  


 


4/1/18 12:00      Room Air  


 


4/1/18 11:35 36.8 66 16 133/75 (94) 94 Room Air  








Physical Exam:


General-aaox3


Eyes-no scleral icterus


ENT-mmm


Neck-supple


Lungs-cta


Heart-rrr


Abdomen-bs+ s/nt/nd


Extremities-no edema, loss of muscle tone


Neuro-chronic ambulatory dysfunction





Current Inpatient Medications








 Medications


  (Trade)  Dose


 Ordered  Sig/Basilia


 Route  Start Time


 Stop Time Status Last Admin


Dose Admin


 


 Heparin Sodium


  (Porcine)


  (Heparin Sq 5000


 Unit/0.5ml)  5,000 unit  Q8


 SQ  3/26/18 22:00


 4/25/18 21:59  4/1/18 20:33


5,000 UNIT


 


 Acetaminophen


  (Tylenol Tab)  650 mg  Q4H  PRN


 PO  3/26/18 14:30


 4/25/18 14:29   


 


 


 Insulin Aspart


  (novoLOG ASPART)  **SLIDING


 SCALE**


 **If C...  TID@1100,1630,2100


 SC  3/26/18 16:30


 4/25/18 16:29  4/1/18 20:32


4 UNITS


 


 Glucose


  (Glucose 40% Gel)  15-30


 GRAMS 15


 GRAMS...  UD  PRN


 PO  3/26/18 15:15


 4/25/18 15:14   


 


 


 Glucose


  (Glucose Chew


 Tab)  4-8


 Tablets 4


 Tabl...  UD  PRN


 PO  3/26/18 15:15


 4/25/18 15:14  3/26/18 17:31


4 TABS


 


 Dextrose


  (Dextrose 50%


 50ML Syringe)  25-50ML OF


 50% DW IV


 FOR...  UD  PRN


 IV  3/26/18 15:15


 4/25/18 15:14  3/27/18 07:57


50 ML


 


 Glucagon


  (Glucagon Inj)  1 mg  UD  PRN


 SQ  3/26/18 15:15


 4/25/18 15:14   


 


 


 Miscellaneous


 Information


  (Consult


 Glycemic


 Management


 Pharmacy)  1 ea  UD  PRN


 N/A  3/26/18 15:22


 4/25/18 15:21   


 


 


 Miscellaneous


 Information


  (Consult)  1 ea  UD  PRN


 N/A  3/26/18 15:30


 4/25/18 15:29   


 


 


 Allopurinol


  (Zyloprim Tab)  100 mg  DAILY


 PO  3/27/18 09:00


 4/26/18 08:59  4/2/18 08:21


100 MG


 


 Calcium Acetate


  (Phoslo Cap)  667 mg  TIDM


 PO  3/26/18 17:00


 4/25/18 17:59  4/2/18 08:15


667 MG


 


 Clopidogrel


 Bisulfate


  (plAVix TAB)  75 mg  DAILY


 PO  3/27/18 09:00


 4/26/18 08:59  4/2/18 08:15


75 MG


 


 Ferrous Sulfate


  (Feosol Tab)  325 mg  TIDM


 PO  3/26/18 17:00


 4/25/18 17:59  4/2/18 08:15


325 MG


 


 Pantoprazole


 Sodium


  (Protonix Tab)  40 mg  DAILY


 PO  3/27/18 09:00


 4/26/18 08:59  4/2/18 08:16


40 MG


 


 Pravastatin Sodium


  (Pravachol Tab)  20 mg  DAILY


 PO  3/27/18 09:00


 4/26/18 08:59  4/2/18 08:15


20 MG


 


 Tacrolimus


  (Prograf Cap)  4 mg  BID


 PO  3/26/18 21:00


 4/25/18 20:59  4/2/18 08:17


4 MG


 


 Amitriptyline HCl


  (Elavil Tab)  150 mg  HS


 PO  3/26/18 21:00


 4/25/18 20:59  4/1/18 20:30


150 MG


 


 Cholecalciferol


  (Vitamin D Tab)  2,000


 inter.unit  DAILY


 PO  3/27/18 09:00


 4/26/18 08:59  4/2/18 08:16


2,000 INTER.UNIT


 


 Insulin Aspart


  (novoLOG ASPART)  **SLIDING


 SCALE**


 **If C...  QDB


 SC  3/27/18 08:00


 4/26/18 07:59  4/2/18 08:20


7 UNITS


 


 Non-Formulary


 Medication


  (Non-Formulary


 Patient'S Own Med)  1 ea  DAILY


 PO  3/27/18 09:00


 4/26/18 08:59  4/2/18 08:17


1 EA


 


 Insulin Glargine


  (Lantus Solostar


 Pen)  10 units  HS


 SC  3/30/18 21:00


 4/29/18 20:59  4/1/18 20:32


10 UNITS


 


 Ondansetron HCl


  (Zofran Inj)  4 mg  Q6H  PRN


 IV  3/30/18 10:00


 4/29/18 09:59   


 











Last 24 Hours








Test


  4/1/18


11:39 4/1/18


16:11 4/1/18


20:11 4/2/18


07:16


 


Bedside Glucose 228 mg/dl  172 mg/dl  231 mg/dl  74 mg/dl 











Assessment & Plan


ESRD-for dialysis today. volume status appears appropriate.  to use fistula 

again today.  ok from renal perspective to go home today after dialysis and 

would plan on continuing to give vancomycin at outpt unit for next two weeks.

## 2021-02-16 NOTE — PROGRESS NOTE
PROBLEM LIST INCLUDES:  Cirrhosis with portal hypertension    HISTORY:  The patient is a 73-year-old male who underwent an upper endoscopy in January of this year for an episode of melena.  He is undergoing a telephone office visit today secondary to the presence of COVID virus in the community.  He is amenable to a telephone office visit.  He was found to have 3 columns of varices noted with 3 bands placed.  Since undergoing this examination the patient has complained of some difficulty with swallowing and substernal chest discomfort.  This seems to be improving over time.  No further episodes of melena and no episodes of hematochezia.  Weight has been stable.  Patient has been increasing his protein intake.    PHYSICAL EXAMINATION:  There were no vitals filed for this visit.  GENERAL:  Patient is alert, in no acute distress.    ASSESSMENT AND PLAN:  Esophageal varices-patient remains on a proton pump inhibitor and Carafate.  Complaints of chest pain have improved over time.  Will arrange for a follow-up upper endoscopy in March.  Further recommendations including possible banding will depend upon whether not large varices are again identified.  ASA class 3, airway class 3.  Plan for mac sedation at the hospital.    Total telephone time-7 minutes       Subjective


Date of Service:


Apr 2, 2018.


Subjective


Pt evaluation today including:  conversation w/ patient, physical exam, lab 

review, review of studies, review of inpatient medication list


Saw/examined the patient in dialysis


he's doing well


no problems/issues to note


eager to go home





Review of Systems


Constitutional:  + weakness, No fever, No chills


Respiratory:  No shortness of breath


Cardiac:  No chest pain





Medications





Current Inpatient Medications








 Medications


  (Trade)  Dose


 Ordered  Sig/Basilia


 Route  Start Time


 Stop Time Status Last Admin


Dose Admin


 


 Heparin Sodium


  (Porcine)


  (Heparin Sq 5000


 Unit/0.5ml)  5,000 unit  Q8


 SQ  3/26/18 22:00


 4/25/18 21:59  4/1/18 20:33


5,000 UNIT


 


 Acetaminophen


  (Tylenol Tab)  650 mg  Q4H  PRN


 PO  3/26/18 14:30


 4/25/18 14:29   


 


 


 Insulin Aspart


  (novoLOG ASPART)  **SLIDING


 SCALE**


 **If C...  TID@1100,1630,2100


 SC  3/26/18 16:30


 4/25/18 16:29  4/1/18 20:32


4 UNITS


 


 Glucose


  (Glucose 40% Gel)  15-30


 GRAMS 15


 GRAMS...  UD  PRN


 PO  3/26/18 15:15


 4/25/18 15:14   


 


 


 Glucose


  (Glucose Chew


 Tab)  4-8


 Tablets 4


 Tabl...  UD  PRN


 PO  3/26/18 15:15


 4/25/18 15:14  3/26/18 17:31


4 TABS


 


 Dextrose


  (Dextrose 50%


 50ML Syringe)  25-50ML OF


 50% DW IV


 FOR...  UD  PRN


 IV  3/26/18 15:15


 4/25/18 15:14  3/27/18 07:57


50 ML


 


 Glucagon


  (Glucagon Inj)  1 mg  UD  PRN


 SQ  3/26/18 15:15


 4/25/18 15:14   


 


 


 Miscellaneous


 Information


  (Consult


 Glycemic


 Management


 Pharmacy)  1 ea  UD  PRN


 N/A  3/26/18 15:22


 4/25/18 15:21   


 


 


 Miscellaneous


 Information


  (Consult)  1 ea  UD  PRN


 N/A  3/26/18 15:30


 4/25/18 15:29   


 


 


 Allopurinol


  (Zyloprim Tab)  100 mg  DAILY


 PO  3/27/18 09:00


 4/26/18 08:59  4/2/18 08:21


100 MG


 


 Calcium Acetate


  (Phoslo Cap)  667 mg  TIDM


 PO  3/26/18 17:00


 4/25/18 17:59  4/2/18 08:15


667 MG


 


 Clopidogrel


 Bisulfate


  (plAVix TAB)  75 mg  DAILY


 PO  3/27/18 09:00


 4/26/18 08:59  4/2/18 08:15


75 MG


 


 Ferrous Sulfate


  (Feosol Tab)  325 mg  TIDM


 PO  3/26/18 17:00


 4/25/18 17:59  4/2/18 08:15


325 MG


 


 Pantoprazole


 Sodium


  (Protonix Tab)  40 mg  DAILY


 PO  3/27/18 09:00


 4/26/18 08:59  4/2/18 08:16


40 MG


 


 Pravastatin Sodium


  (Pravachol Tab)  20 mg  DAILY


 PO  3/27/18 09:00


 4/26/18 08:59  4/2/18 08:15


20 MG


 


 Tacrolimus


  (Prograf Cap)  4 mg  BID


 PO  3/26/18 21:00


 4/25/18 20:59  4/2/18 08:17


4 MG


 


 Amitriptyline HCl


  (Elavil Tab)  150 mg  HS


 PO  3/26/18 21:00


 4/25/18 20:59  4/1/18 20:30


150 MG


 


 Cholecalciferol


  (Vitamin D Tab)  2,000


 inter.unit  DAILY


 PO  3/27/18 09:00


 4/26/18 08:59  4/2/18 08:16


2,000 INTER.UNIT


 


 Insulin Aspart


  (novoLOG ASPART)  **SLIDING


 SCALE**


 **If C...  QDB


 SC  3/27/18 08:00


 4/26/18 07:59  4/2/18 08:20


7 UNITS


 


 Non-Formulary


 Medication


  (Non-Formulary


 Patient'S Own Med)  1 ea  DAILY


 PO  3/27/18 09:00


 4/26/18 08:59  4/2/18 08:17


1 EA


 


 Ondansetron HCl


  (Zofran Inj)  4 mg  Q6H  PRN


 IV  3/30/18 10:00


 4/29/18 09:59   


 


 


 Vancomycin HCl


 750 mg/Sodium


 Chloride  265 ml @ 


 200 mls/hr  TODAY@1200


 IV  4/2/18 12:00


 4/2/18 18:00   


 


 


 Insulin Glargine


  (Lantus Solostar


 Pen)  8 units  HS


 SC  4/2/18 21:00


 5/2/18 20:59   


 











Objective


Vital Signs











  Date Time  Temp Pulse Resp B/P (MAP) Pulse Ox O2 Delivery O2 Flow Rate FiO2


 


4/2/18 12:15  77  113/95    


 


4/2/18 12:00  76  109/68    


 


4/2/18 11:45  77  110/76    


 


4/2/18 11:30  73  112/70    


 


4/2/18 11:15  73  113/72    


 


4/2/18 11:00  73  114/72    


 


4/2/18 10:45  75  114/74    


 


4/2/18 10:30  74  112/72    


 


4/2/18 10:15  73  105/69    


 


4/2/18 10:00  72  108/68    


 


4/2/18 09:45  72  118/72    


 


4/2/18 09:30  70  117/70    


 


4/2/18 09:15  70  119/79    


 


4/2/18 09:02  70  117/77    


 


4/2/18 08:00      Room Air  


 


4/2/18 07:05 36.5 65 18 119/69 (86) 96 Room Air  


 


4/2/18 04:29 36.6 65 18 138/73 (94) 95 Room Air  


 


4/2/18 04:00      Room Air  


 


4/2/18 00:00      Room Air  


 


4/1/18 22:56 36.8 69 18 133/86 (102) 97 Room Air  


 


4/1/18 20:00      Room Air  


 


4/1/18 18:56 36.3 78 20 135/82 (99) 96   


 


4/1/18 16:00      Room Air  


 


4/1/18 15:46 36.7 57 18 125/76 (92) 93 Room Air  











Physical Exam


General Appearance:  no apparent distress


Cardiovascular:  regular rate, rhythm, no edema, no murmur


Neurologic/Psychiatric:  alert, + motor weakness (chronic motor weakness)





Laboratory Results





Last 24 Hours








Test


  4/1/18


16:11 4/1/18


20:11 4/2/18


07:16 4/2/18


12:08


 


Bedside Glucose 172 mg/dl  231 mg/dl  74 mg/dl  125 mg/dl 











Assessment and Plan


This is a 63 year old male with a recent initiation of dialysis, AV fistula on 

R arm, not yet fully matured; insulin dependent DM, CAD, cerebral palsy





Enterococcal Bacteremia


4/2


- blood cultures negative


- Vancomycin set up to get with dialysis x2 weeks


- plan to d/c home today after dialysis





4/1


- can likely discharge in 1-2 days


- blood cultures negative thus far


- will need Vanco x2 weeks with dialysis on discharge





3/31


- repeat blood cultures pending


- will continue Vancomycin with dialysis


- plan to d/c when blood cultures are negative





3/30


- persistent bacteremia in the setting of immunocompromised patient


- catheter tip and blood cultures x3 so far are positive


- another set drawn, may need further w/up, defer to ID


- for now, continue IV Vancomycin with dialysis as per ID; will need 2 weeks of 

treatment at minimum





3/29


- blood cultures positive x2 for streptococcal species and tip culture positive 

for enterococcus


- spoke with ID, vanco with dialysis x2 weeks


- repeat blood cultures ordered and pending, once negative, can d/c home





3/28


- streptococcal species on blood cultures x2


- enterococcus species growing on the tip of the catheter; sensitivities pending


- continue Vanco for now





3/27


- patient admitted due to outpatient blood cultures being positive


- given IV Vanco


- tunneled dialysis catheter removed


- blood cultures here are also positive x2


- echo with no evidence of endocarditis


- further input as per ID





ESRD on HD


3/29


- dialysis on 3/30





3/28


- s/p dialysis today


- AV fistula functioning





3/27


- appreciate vascular surgery input


- catheter removed, tip cultured


- can use mature AV fistula


- dialysis as per nephrology





Hx. of CAD


- troponins negative x3


- unlikely acute issue


- continue home medications





Uncontrolled DM2


- continue insulin


- appreciate pharmacy glycemic control





DVT ppx


- subq heparin





FULL CODE